# Patient Record
Sex: FEMALE | Race: WHITE | NOT HISPANIC OR LATINO | Employment: OTHER | URBAN - METROPOLITAN AREA
[De-identification: names, ages, dates, MRNs, and addresses within clinical notes are randomized per-mention and may not be internally consistent; named-entity substitution may affect disease eponyms.]

---

## 2017-01-03 DIAGNOSIS — M41.56 OTHER SECONDARY SCOLIOSIS, LUMBAR REGION: Primary | ICD-10-CM

## 2017-01-06 DIAGNOSIS — M48.061 LUMBAR STENOSIS: Primary | ICD-10-CM

## 2017-04-25 RX ORDER — LISINOPRIL AND HYDROCHLOROTHIAZIDE 12.5; 1 MG/1; MG/1
1 TABLET ORAL DAILY
COMMUNITY
End: 2017-05-01

## 2017-05-01 ENCOUNTER — APPOINTMENT (OUTPATIENT)
Dept: PREADMISSION TESTING | Facility: HOSPITAL | Age: 62
End: 2017-05-01

## 2017-05-01 VITALS
TEMPERATURE: 98.8 F | OXYGEN SATURATION: 100 % | HEART RATE: 68 BPM | HEIGHT: 63 IN | DIASTOLIC BLOOD PRESSURE: 67 MMHG | BODY MASS INDEX: 28.24 KG/M2 | RESPIRATION RATE: 16 BRPM | SYSTOLIC BLOOD PRESSURE: 103 MMHG | WEIGHT: 159.38 LBS

## 2017-05-01 LAB
ANION GAP SERPL CALCULATED.3IONS-SCNC: 13.2 MMOL/L
BUN BLD-MCNC: 20 MG/DL (ref 8–23)
BUN/CREAT SERPL: 20.6 (ref 7–25)
CALCIUM SPEC-SCNC: 9.4 MG/DL (ref 8.6–10.5)
CHLORIDE SERPL-SCNC: 95 MMOL/L (ref 98–107)
CO2 SERPL-SCNC: 26.8 MMOL/L (ref 22–29)
CREAT BLD-MCNC: 0.97 MG/DL (ref 0.57–1)
DEPRECATED RDW RBC AUTO: 44.8 FL (ref 37–54)
ERYTHROCYTE [DISTWIDTH] IN BLOOD BY AUTOMATED COUNT: 13.1 % (ref 11.7–13)
GFR SERPL CREATININE-BSD FRML MDRD: 58 ML/MIN/1.73
GLUCOSE BLD-MCNC: 176 MG/DL (ref 65–99)
HCT VFR BLD AUTO: 39.1 % (ref 35.6–45.5)
HGB BLD-MCNC: 12.9 G/DL (ref 11.9–15.5)
MCH RBC QN AUTO: 31.4 PG (ref 26.9–32)
MCHC RBC AUTO-ENTMCNC: 33 G/DL (ref 32.4–36.3)
MCV RBC AUTO: 95.1 FL (ref 80.5–98.2)
PLATELET # BLD AUTO: 224 10*3/MM3 (ref 140–500)
PMV BLD AUTO: 11.6 FL (ref 6–12)
POTASSIUM BLD-SCNC: 4 MMOL/L (ref 3.5–5.2)
RBC # BLD AUTO: 4.11 10*6/MM3 (ref 3.9–5.2)
SODIUM BLD-SCNC: 135 MMOL/L (ref 136–145)
WBC NRBC COR # BLD: 5.96 10*3/MM3 (ref 4.5–10.7)

## 2017-05-01 PROCEDURE — 93005 ELECTROCARDIOGRAM TRACING: CPT

## 2017-05-01 PROCEDURE — 85027 COMPLETE CBC AUTOMATED: CPT | Performed by: ORTHOPAEDIC SURGERY

## 2017-05-01 PROCEDURE — 93010 ELECTROCARDIOGRAM REPORT: CPT | Performed by: INTERNAL MEDICINE

## 2017-05-01 PROCEDURE — 80048 BASIC METABOLIC PNL TOTAL CA: CPT | Performed by: ORTHOPAEDIC SURGERY

## 2017-05-01 PROCEDURE — 36415 COLL VENOUS BLD VENIPUNCTURE: CPT

## 2017-05-01 RX ORDER — HYDROCHLOROTHIAZIDE 12.5 MG/1
12.5 CAPSULE, GELATIN COATED ORAL EVERY MORNING
COMMUNITY
End: 2019-11-01 | Stop reason: SDUPTHER

## 2017-05-01 RX ORDER — LISINOPRIL 5 MG/1
5 TABLET ORAL EVERY MORNING
COMMUNITY
End: 2020-02-07 | Stop reason: SDUPTHER

## 2017-05-09 ENCOUNTER — OFFICE VISIT (OUTPATIENT)
Dept: NEUROSURGERY | Facility: CLINIC | Age: 62
End: 2017-05-09

## 2017-05-09 ENCOUNTER — TELEPHONE (OUTPATIENT)
Dept: ORTHOPEDIC SURGERY | Facility: CLINIC | Age: 62
End: 2017-05-09

## 2017-05-09 VITALS
DIASTOLIC BLOOD PRESSURE: 70 MMHG | SYSTOLIC BLOOD PRESSURE: 113 MMHG | WEIGHT: 158 LBS | HEART RATE: 83 BPM | HEIGHT: 63 IN | BODY MASS INDEX: 28 KG/M2

## 2017-05-09 DIAGNOSIS — M54.16 LUMBAR RADICULOPATHY: ICD-10-CM

## 2017-05-09 DIAGNOSIS — M48.061 LUMBAR CANAL STENOSIS: Primary | ICD-10-CM

## 2017-05-09 PROCEDURE — 99244 OFF/OP CNSLTJ NEW/EST MOD 40: CPT | Performed by: NEUROLOGICAL SURGERY

## 2018-03-23 ENCOUNTER — APPOINTMENT (OUTPATIENT)
Dept: WOMENS IMAGING | Facility: HOSPITAL | Age: 63
End: 2018-03-23

## 2018-03-23 PROCEDURE — 77063 BREAST TOMOSYNTHESIS BI: CPT | Performed by: RADIOLOGY

## 2018-03-23 PROCEDURE — MDREVSPNEW: Performed by: RADIOLOGY

## 2018-03-23 PROCEDURE — 77067 SCR MAMMO BI INCL CAD: CPT | Performed by: RADIOLOGY

## 2018-06-15 NOTE — PROGRESS NOTES
Subjective   Patient ID: Kathy Ramirez is a 63 y.o. female is here today for follow-up on back pain that radiates into her right leg with numbness and tingling. She also has had some neck pain for approximately 1 month with radiation into bilateral upper extremity's    History of Present Illness    This patient continues with pain in her back with radiation primarily into her right leg with some numbness and tingling.  This is pretty much unchanged from when I saw her a little over a year ago.  She has been dealing with the symptoms and just tolerating them.  Generally she can do most activities that she wants to do.  Over the past several months she has developed some pain in her neck with some numbness and tingling radiating into her arms area most of the symptoms are in the right arm but she has some temp symptoms on the left as well.  The symptoms on the left has started only recently.  She has been treated with chiropractic but had no other treatment so far.  The pain that she has is dull and aching.  The pain is worse with activity and a little bit better with rest.  She has no difficulty with bowel or bladder control or other associated symptoms.    The following portions of the patient's history were reviewed and updated as appropriate: allergies, current medications, past family history, past medical history, past social history, past surgical history and problem list.    Review of Systems   Respiratory: Negative for chest tightness and shortness of breath.    Cardiovascular: Negative for chest pain.   Musculoskeletal: Positive for back pain and neck pain.        Right leg pain   Neurological: Positive for numbness.        Tingling   All other systems reviewed and are negative.      Objective   Physical Exam   Constitutional: She is oriented to person, place, and time. She appears well-developed and well-nourished.   Eyes: EOM are normal. Pupils are equal, round, and reactive to light.   Neurological: She is  oriented to person, place, and time. She has a normal Finger-Nose-Finger Test and a normal Heel to Shin Test. Gait normal.   Reflex Scores:       Tricep reflexes are 2+ on the right side and 2+ on the left side.       Bicep reflexes are 2+ on the right side and 2+ on the left side.       Brachioradialis reflexes are 2+ on the right side and 2+ on the left side.       Patellar reflexes are 2+ on the right side and 2+ on the left side.       Achilles reflexes are 2+ on the right side and 2+ on the left side.  Psychiatric: Her speech is normal.     Neurologic Exam     Mental Status   Oriented to person, place, and time.   Registration of memory: Good recent and remote memory.   Attention: normal. Concentration: normal.   Speech: speech is normal   Level of consciousness: alert  Knowledge: consistent with education.     Cranial Nerves     CN II   Visual fields full to confrontation.   Visual acuity: normal    CN III, IV, VI   Pupils are equal, round, and reactive to light.  Extraocular motions are normal.     CN V   Facial sensation intact.   Right corneal reflex: normal  Left corneal reflex: normal    CN VII   Facial expression full, symmetric.   Right facial weakness: none  Left facial weakness: none    CN VIII   Hearing: intact    CN IX, X   Palate: symmetric    CN XI   Right sternocleidomastoid strength: normal  Left sternocleidomastoid strength: normal    CN XII   Tongue: not atrophic  Tongue deviation: none    Motor Exam   Muscle bulk: normal  Right arm tone: normal  Left arm tone: normal  Right leg tone: normal  Left leg tone: normal    Strength   Strength 5/5 except as noted.     Sensory Exam   Light touch normal.     Gait, Coordination, and Reflexes     Gait  Gait: normal    Coordination   Finger to nose coordination: normal  Heel to shin coordination: normal    Reflexes   Right brachioradialis: 2+  Left brachioradialis: 2+  Right biceps: 2+  Left biceps: 2+  Right triceps: 2+  Left triceps: 2+  Right patellar:  2+  Left patellar: 2+  Right achilles: 2+  Left achilles: 2+  Right : 2+  Left : 2+      Assessment/Plan   Independent Review of Radiographic Studies:      I reviewed an MRI of her cervical spine done on April 26 myself.  This shows a widely patent canal and neural foramina at C2 3 and C3 4.  C4 5 is also fairly open but C5 6 shows some midline disc bulging and some stenosis causing distortion of the cord.  C6 7 is fairly open as is C7-T1.    I also reviewed an MRI of the lumbar spine done on the same day.  This shows a vestigial disc at S1 and S2.  There is some disc bulging to the right side at L1 2 that maybe narrowing the foramen a little bit.  At L2-3 there is also a little disc bulging but no significant stenosis.  There is a large synovial cyst to the left side at L3 4 is similar to the previous CAT scan and myelogram.  L4 5 shows some bilateral lateral recess stenosis and L5-S1 mostly looks okay.    I also reviewed her myelogram from December 2016 which shows a large defect on the left side at L3 4 and some defect on the left side at L4 5 as well.  The narrowing is not nearly as bad at L4 5 as it is at L3 4.  The other levels mostly look okay.  On the post myelographic CT scan the lower thoracic spine down to L2 3 looks okay.  L3 4 shows a synovial cyst on the left side similar to the current MRI.  L4 5 shows a little lateral recess stenosis again similar to the current MRI.  L5-S1 is mostly open.    Medical Decision Making:      I told the patient about the imaging.  I told her that nothing is really changed in her lumbar spine and since she has mostly been living with this and tolerating it I really wouldn't suggest doing a lot else with her right now in the lower back.  The neck is a little bit different story in that the symptoms have developed more recently and are more neurologic symptoms and pain.  Plus she has a fair amount of stenosis at C5 6 area there is some spinal cord pressure as well.   I told the patient about the surgery which is called an anterior cervical discectomy.  I explained that there is an 80% chance of getting rid of the arm pain and any other arm symptoms.  I also explained that the patient would still have neck pain.  Initially this will be quite severe however it will improve with healing from the surgery.  There is also a risk of infection, bleeding, paralysis, and anesthetic risk.  There is a risk of damage to the soft tissues of the neck such as the esophagus, carotids, and trachea.  All of these risks are about 2 or 3%.  There is about a 5% chance of nonunion or failure of the instrumentation.  There is also a about a 5% chance of hoarseness and trouble swallowing do to damage to the recurrent laryngeal nerve.  We discussed the postoperative hospital and home course as well.  The patient does ask to proceed.      She will need to be scheduled for a: C5 6 anterior cervical discectomy, fusion and instrumentation    Kathy was seen today for back pain and neck pain.    Diagnoses and all orders for this visit:    Cervical spinal stenosis  -     Case Request; Standing  -     ceFAZolin (ANCEF) 2 g in sodium chloride 0.9 % 100 mL IVPB; Infuse 2 g into a venous catheter 1 (One) Time.  -     Case Request    Spinal stenosis of lumbar region without neurogenic claudication    Other orders  -     Follow anesthesia standing orders.  -     Obtain informed consent  -     MAYELA hose- To be placed on patient in pre-op; Standing  -     SCD (sequential compression device)- to be placed on patient in Pre-op; Standing      Return for 2-3 week post op.

## 2018-06-21 ENCOUNTER — OFFICE VISIT (OUTPATIENT)
Dept: NEUROSURGERY | Facility: CLINIC | Age: 63
End: 2018-06-21

## 2018-06-21 VITALS — HEART RATE: 67 BPM | SYSTOLIC BLOOD PRESSURE: 149 MMHG | DIASTOLIC BLOOD PRESSURE: 80 MMHG

## 2018-06-21 DIAGNOSIS — M48.061 SPINAL STENOSIS OF LUMBAR REGION WITHOUT NEUROGENIC CLAUDICATION: ICD-10-CM

## 2018-06-21 DIAGNOSIS — M48.02 CERVICAL SPINAL STENOSIS: Primary | ICD-10-CM

## 2018-06-21 PROCEDURE — 99214 OFFICE O/P EST MOD 30 MIN: CPT | Performed by: NEUROLOGICAL SURGERY

## 2018-06-21 RX ORDER — CEFAZOLIN SODIUM 2 G/100ML
2 INJECTION, SOLUTION INTRAVENOUS ONCE
Status: CANCELLED | OUTPATIENT
Start: 2018-07-02 | End: 2018-07-02

## 2018-06-22 ENCOUNTER — APPOINTMENT (OUTPATIENT)
Dept: PREADMISSION TESTING | Facility: HOSPITAL | Age: 63
End: 2018-06-22

## 2018-06-22 LAB
ANION GAP SERPL CALCULATED.3IONS-SCNC: 14.4 MMOL/L
BUN BLD-MCNC: 12 MG/DL (ref 8–23)
BUN/CREAT SERPL: 11.5 (ref 7–25)
CALCIUM SPEC-SCNC: 9.3 MG/DL (ref 8.6–10.5)
CHLORIDE SERPL-SCNC: 96 MMOL/L (ref 98–107)
CO2 SERPL-SCNC: 26.6 MMOL/L (ref 22–29)
CREAT BLD-MCNC: 1.04 MG/DL (ref 0.57–1)
DEPRECATED RDW RBC AUTO: 43.9 FL (ref 37–54)
ERYTHROCYTE [DISTWIDTH] IN BLOOD BY AUTOMATED COUNT: 13 % (ref 11.7–13)
GFR SERPL CREATININE-BSD FRML MDRD: 53 ML/MIN/1.73
GLUCOSE BLD-MCNC: 256 MG/DL (ref 65–99)
HCT VFR BLD AUTO: 39.3 % (ref 35.6–45.5)
HGB BLD-MCNC: 13.4 G/DL (ref 11.9–15.5)
MCH RBC QN AUTO: 31.7 PG (ref 26.9–32)
MCHC RBC AUTO-ENTMCNC: 34.1 G/DL (ref 32.4–36.3)
MCV RBC AUTO: 92.9 FL (ref 80.5–98.2)
PLATELET # BLD AUTO: 226 10*3/MM3 (ref 140–500)
PMV BLD AUTO: 11.7 FL (ref 6–12)
POTASSIUM BLD-SCNC: 3.4 MMOL/L (ref 3.5–5.2)
RBC # BLD AUTO: 4.23 10*6/MM3 (ref 3.9–5.2)
SODIUM BLD-SCNC: 137 MMOL/L (ref 136–145)
WBC NRBC COR # BLD: 6.85 10*3/MM3 (ref 4.5–10.7)

## 2018-06-22 PROCEDURE — 85027 COMPLETE CBC AUTOMATED: CPT | Performed by: NEUROLOGICAL SURGERY

## 2018-06-22 PROCEDURE — 80048 BASIC METABOLIC PNL TOTAL CA: CPT | Performed by: NEUROLOGICAL SURGERY

## 2018-06-22 PROCEDURE — 36415 COLL VENOUS BLD VENIPUNCTURE: CPT

## 2018-06-22 PROCEDURE — 93005 ELECTROCARDIOGRAM TRACING: CPT

## 2018-06-22 PROCEDURE — 93010 ELECTROCARDIOGRAM REPORT: CPT | Performed by: INTERNAL MEDICINE

## 2018-06-22 RX ORDER — GLIMEPIRIDE 2 MG/1
1 TABLET ORAL
COMMUNITY

## 2018-06-22 NOTE — DISCHARGE INSTRUCTIONS
Take the following medications the morning of surgery with a small sip of water:    NONE    ARRIVE AT 10:00    General Instructions:  • Do not eat solid food after midnight the night before surgery.  • You may drink clear liquids day of surgery but must stop at least one hour before your hospital arrival time.  • It is beneficial for you to have a clear drink that contains carbohydrates the day of surgery.  We suggest a 12 to 20 ounce bottle of Gatorade or Powerade for non-diabetic patients or a 12 to 20 ounce bottle of G2 or Powerade Zero for diabetic patients. (Pediatric patients, are not advised to drink a 12 to 20 ounce carbohydrate drink)    Clear liquids are liquids you can see through.  Nothing red in color.     Plain water                               Sports drinks  Sodas                                   Gelatin (Jell-O)  Fruit juices without pulp such as white grape juice and apple juice  Popsicles that contain no fruit or yogurt  Tea or coffee (no cream or milk added)  Gatorade / Powerade  G2 / Powerade Zero    • Infants may have breast milk up to four hours before surgery.  • Infants drinking formula may drink formula up to six hours before surgery.   • Patients who avoid smoking, chewing tobacco and alcohol for 4 weeks prior to surgery have a reduced risk of post-operative complications.  Quit smoking as many days before surgery as you can.  • Do not smoke, use chewing tobacco or drink alcohol the day of surgery.   • If applicable bring your C-PAP/ BI-PAP machine.  • Bring any papers given to you in the doctor’s office.  • Wear clean comfortable clothes and socks.  • Do not wear contact lenses or make-up.  Bring a case for your glasses.   • Bring crutches or walker if applicable.  • Remove all piercings.  Leave jewelry and any other valuables at home.  • Hair extensions with metal clips must be removed prior to surgery.  • The Pre-Admission Testing nurse will instruct you to bring medications if unable  to obtain an accurate list in Pre-Admission Testing.        If you were given a blood bank ID arm band remember to bring it with you the day of surgery.    Preventing a Surgical Site Infection:  • For 2 to 3 days before surgery, avoid shaving with a razor because the razor can irritate skin and make it easier to develop an infection.    • Any areas of open skin can increase the risk of a post-operative wound infection by allowing bacteria to enter and travel throughout the body.  Notify your surgeon if you have any skin wounds / rashes even if it is not near the expected surgical site.  The area will need assessed to determine if surgery should be delayed until it is healed.  • The night prior to surgery sleep in a clean bed with clean clothing.  Do not allow pets to sleep with you.  • Shower on the morning of surgery using a fresh bar of anti-bacterial soap (such as Dial) and clean washcloth.  Dry with a clean towel and dress in clean clothing.  • Ask your surgeon if you will be receiving antibiotics prior to surgery.  • Make sure you, your family, and all healthcare providers clean their hands with soap and water or an alcohol based hand  before caring for you or your wound.    Day of surgery:  Upon arrival, a Pre-op nurse and Anesthesiologist will review your health history, obtain vital signs, and answer questions you may have.  The only belongings needed at this time will be your home medications and if applicable your C-PAP/BI-PAP machine.  If you are staying overnight your family can leave the rest of your belongings in the car and bring them to your room later.  A Pre-op nurse will start an IV and you may receive medication in preparation for surgery, including something to help you relax.  Your family will be able to see you in the Pre-op area.  While you are in surgery your family should notify the waiting room  if they leave the waiting room area and provide a contact phone  number.    Please be aware that surgery does come with discomfort.  We want to make every effort to control your discomfort so please discuss any uncontrolled symptoms with your nurse.   Your doctor will most likely have prescribed pain medications.      If you are going home after surgery you will receive individualized written care instructions before being discharged.  A responsible adult must drive you to and from the hospital on the day of your surgery and stay with you for 24 hours.    If you are staying overnight following surgery, you will be transported to your hospital room following the recovery period.  [facility] has all private rooms.    If you have any questions please call Pre-Admission Testing at [phone].  Deductibles and co-payments are collected on the day of service. Please be prepared to pay the required co-pay, deductible or deposit on the day of service as defined by your plan.

## 2018-07-02 ENCOUNTER — ANESTHESIA EVENT (OUTPATIENT)
Dept: PERIOP | Facility: HOSPITAL | Age: 63
End: 2018-07-02

## 2018-07-02 ENCOUNTER — ANESTHESIA (OUTPATIENT)
Dept: PERIOP | Facility: HOSPITAL | Age: 63
End: 2018-07-02

## 2018-07-02 ENCOUNTER — HOSPITAL ENCOUNTER (OUTPATIENT)
Facility: HOSPITAL | Age: 63
Discharge: HOME | End: 2018-07-04
Attending: NEUROLOGICAL SURGERY | Admitting: NEUROLOGICAL SURGERY

## 2018-07-02 ENCOUNTER — APPOINTMENT (OUTPATIENT)
Dept: GENERAL RADIOLOGY | Facility: HOSPITAL | Age: 63
End: 2018-07-02

## 2018-07-02 DIAGNOSIS — M48.02 CERVICAL SPINAL STENOSIS: ICD-10-CM

## 2018-07-02 PROBLEM — I10 HYPERTENSION: Status: ACTIVE | Noted: 2018-07-02

## 2018-07-02 PROBLEM — E11.9 DIABETES MELLITUS (HCC): Status: ACTIVE | Noted: 2018-07-02

## 2018-07-02 PROBLEM — M06.9 RHEUMATOID ARTHRITIS (HCC): Status: ACTIVE | Noted: 2018-07-02

## 2018-07-02 LAB
ANION GAP SERPL CALCULATED.3IONS-SCNC: 12.9 MMOL/L
BUN BLD-MCNC: 16 MG/DL (ref 8–23)
BUN/CREAT SERPL: 16.8 (ref 7–25)
CALCIUM SPEC-SCNC: 9 MG/DL (ref 8.6–10.5)
CHLORIDE SERPL-SCNC: 103 MMOL/L (ref 98–107)
CO2 SERPL-SCNC: 24.1 MMOL/L (ref 22–29)
CREAT BLD-MCNC: 0.95 MG/DL (ref 0.57–1)
GFR SERPL CREATININE-BSD FRML MDRD: 59 ML/MIN/1.73
GLUCOSE BLD-MCNC: 165 MG/DL (ref 65–99)
GLUCOSE BLDC GLUCOMTR-MCNC: 158 MG/DL (ref 70–130)
GLUCOSE BLDC GLUCOMTR-MCNC: 167 MG/DL (ref 70–130)
GLUCOSE BLDC GLUCOMTR-MCNC: 171 MG/DL (ref 70–130)
GLUCOSE BLDC GLUCOMTR-MCNC: 174 MG/DL (ref 70–130)
POTASSIUM BLD-SCNC: 3.7 MMOL/L (ref 3.5–5.2)
SODIUM BLD-SCNC: 140 MMOL/L (ref 136–145)

## 2018-07-02 PROCEDURE — 25010000002 HYDROMORPHONE PER 4 MG: Performed by: NURSE ANESTHETIST, CERTIFIED REGISTERED

## 2018-07-02 PROCEDURE — 25010000002 MORPHINE PER 10 MG: Performed by: NEUROLOGICAL SURGERY

## 2018-07-02 PROCEDURE — 25810000003 SODIUM CHLORIDE 0.9 % WITH KCL 20 MEQ 20-0.9 MEQ/L-% SOLUTION: Performed by: NEUROLOGICAL SURGERY

## 2018-07-02 PROCEDURE — 63710000001 INSULIN ASPART PER 5 UNITS: Performed by: INTERNAL MEDICINE

## 2018-07-02 PROCEDURE — 25010000002 FENTANYL CITRATE (PF) 100 MCG/2ML SOLUTION: Performed by: ANESTHESIOLOGY

## 2018-07-02 PROCEDURE — 25010000002 PROPOFOL 10 MG/ML EMULSION: Performed by: NURSE ANESTHETIST, CERTIFIED REGISTERED

## 2018-07-02 PROCEDURE — 25010000002 FENTANYL CITRATE (PF) 100 MCG/2ML SOLUTION: Performed by: NURSE ANESTHETIST, CERTIFIED REGISTERED

## 2018-07-02 PROCEDURE — 22853 INSJ BIOMECHANICAL DEVICE: CPT | Performed by: NEUROLOGICAL SURGERY

## 2018-07-02 PROCEDURE — 25010000002 ONDANSETRON PER 1 MG: Performed by: NURSE ANESTHETIST, CERTIFIED REGISTERED

## 2018-07-02 PROCEDURE — G0378 HOSPITAL OBSERVATION PER HR: HCPCS

## 2018-07-02 PROCEDURE — 80048 BASIC METABOLIC PNL TOTAL CA: CPT | Performed by: ANESTHESIOLOGY

## 2018-07-02 PROCEDURE — 25010000003 CEFAZOLIN IN DEXTROSE 2-4 GM/100ML-% SOLUTION: Performed by: NEUROLOGICAL SURGERY

## 2018-07-02 PROCEDURE — 76000 FLUOROSCOPY <1 HR PHYS/QHP: CPT

## 2018-07-02 PROCEDURE — C1713 ANCHOR/SCREW BN/BN,TIS/BN: HCPCS | Performed by: NEUROLOGICAL SURGERY

## 2018-07-02 PROCEDURE — 72040 X-RAY EXAM NECK SPINE 2-3 VW: CPT

## 2018-07-02 PROCEDURE — 25010000002 MIDAZOLAM PER 1 MG: Performed by: ANESTHESIOLOGY

## 2018-07-02 PROCEDURE — 22551 ARTHRD ANT NTRBDY CERVICAL: CPT | Performed by: NEUROLOGICAL SURGERY

## 2018-07-02 PROCEDURE — 22845 INSERT SPINE FIXATION DEVICE: CPT | Performed by: NEUROLOGICAL SURGERY

## 2018-07-02 PROCEDURE — 25010000002 PHENYLEPHRINE PER 1 ML: Performed by: NURSE ANESTHETIST, CERTIFIED REGISTERED

## 2018-07-02 PROCEDURE — 82962 GLUCOSE BLOOD TEST: CPT

## 2018-07-02 DEVICE — WAX BONE HEMO NAT 2.5G: Type: IMPLANTABLE DEVICE | Status: FUNCTIONAL

## 2018-07-02 DEVICE — PUTTY DBF GRAFTON 3CC: Type: IMPLANTABLE DEVICE | Site: SPINE CERVICAL | Status: FUNCTIONAL

## 2018-07-02 DEVICE — PLATE 7200025 ATL VISION ELITE 25MM
Type: IMPLANTABLE DEVICE | Site: SPINE CERVICAL | Status: FUNCTIONAL
Brand: ATLANTIS® ANTERIOR CERVICAL PLATE SYSTEM

## 2018-07-02 DEVICE — IMPLANT 6240764 ANATOMIC 16X14X7MM
Type: IMPLANTABLE DEVICE | Site: SPINE CERVICAL | Status: FUNCTIONAL
Brand: VERTE-STACK® SPINAL SYSTEM

## 2018-07-02 RX ORDER — SODIUM CHLORIDE 0.9 % (FLUSH) 0.9 %
1-10 SYRINGE (ML) INJECTION AS NEEDED
Status: DISCONTINUED | OUTPATIENT
Start: 2018-07-02 | End: 2018-07-02 | Stop reason: HOSPADM

## 2018-07-02 RX ORDER — PROMETHAZINE HYDROCHLORIDE 25 MG/1
12.5 TABLET ORAL EVERY 6 HOURS PRN
Status: DISCONTINUED | OUTPATIENT
Start: 2018-07-02 | End: 2018-07-04 | Stop reason: HOSPADM

## 2018-07-02 RX ORDER — PROMETHAZINE HYDROCHLORIDE 25 MG/ML
12.5 INJECTION, SOLUTION INTRAMUSCULAR; INTRAVENOUS ONCE AS NEEDED
Status: DISCONTINUED | OUTPATIENT
Start: 2018-07-02 | End: 2018-07-02

## 2018-07-02 RX ORDER — MIDAZOLAM HYDROCHLORIDE 1 MG/ML
2 INJECTION INTRAMUSCULAR; INTRAVENOUS
Status: DISCONTINUED | OUTPATIENT
Start: 2018-07-02 | End: 2018-07-02 | Stop reason: HOSPADM

## 2018-07-02 RX ORDER — DEXTROSE MONOHYDRATE 25 G/50ML
25 INJECTION, SOLUTION INTRAVENOUS
Status: DISCONTINUED | OUTPATIENT
Start: 2018-07-02 | End: 2018-07-04 | Stop reason: HOSPADM

## 2018-07-02 RX ORDER — MORPHINE SULFATE 2 MG/ML
2 INJECTION, SOLUTION INTRAMUSCULAR; INTRAVENOUS EVERY 4 HOURS PRN
Status: DISCONTINUED | OUTPATIENT
Start: 2018-07-02 | End: 2018-07-04 | Stop reason: HOSPADM

## 2018-07-02 RX ORDER — ONDANSETRON 2 MG/ML
4 INJECTION INTRAMUSCULAR; INTRAVENOUS ONCE AS NEEDED
Status: DISCONTINUED | OUTPATIENT
Start: 2018-07-02 | End: 2018-07-02

## 2018-07-02 RX ORDER — ATORVASTATIN CALCIUM 10 MG/1
10 TABLET, FILM COATED ORAL EVERY EVENING
Status: DISCONTINUED | OUTPATIENT
Start: 2018-07-02 | End: 2018-07-04 | Stop reason: HOSPADM

## 2018-07-02 RX ORDER — SODIUM CHLORIDE AND POTASSIUM CHLORIDE 150; 900 MG/100ML; MG/100ML
100 INJECTION, SOLUTION INTRAVENOUS CONTINUOUS
Status: DISCONTINUED | OUTPATIENT
Start: 2018-07-02 | End: 2018-07-04 | Stop reason: HOSPADM

## 2018-07-02 RX ORDER — SODIUM CHLORIDE 0.9 % (FLUSH) 0.9 %
1-10 SYRINGE (ML) INJECTION AS NEEDED
Status: DISCONTINUED | OUTPATIENT
Start: 2018-07-02 | End: 2018-07-04 | Stop reason: HOSPADM

## 2018-07-02 RX ORDER — FAMOTIDINE 10 MG/ML
20 INJECTION, SOLUTION INTRAVENOUS ONCE
Status: COMPLETED | OUTPATIENT
Start: 2018-07-02 | End: 2018-07-02

## 2018-07-02 RX ORDER — PROMETHAZINE HYDROCHLORIDE 25 MG/1
25 SUPPOSITORY RECTAL ONCE AS NEEDED
Status: DISCONTINUED | OUTPATIENT
Start: 2018-07-02 | End: 2018-07-02

## 2018-07-02 RX ORDER — LIDOCAINE HYDROCHLORIDE 10 MG/ML
0.5 INJECTION, SOLUTION EPIDURAL; INFILTRATION; INTRACAUDAL; PERINEURAL ONCE AS NEEDED
Status: DISCONTINUED | OUTPATIENT
Start: 2018-07-02 | End: 2018-07-02 | Stop reason: HOSPADM

## 2018-07-02 RX ORDER — CYCLOBENZAPRINE HCL 10 MG
10 TABLET ORAL 3 TIMES DAILY PRN
Status: DISCONTINUED | OUTPATIENT
Start: 2018-07-02 | End: 2018-07-04 | Stop reason: HOSPADM

## 2018-07-02 RX ORDER — EPHEDRINE SULFATE 50 MG/ML
5 INJECTION, SOLUTION INTRAVENOUS ONCE AS NEEDED
Status: DISCONTINUED | OUTPATIENT
Start: 2018-07-02 | End: 2018-07-02

## 2018-07-02 RX ORDER — HYDROCODONE BITARTRATE AND ACETAMINOPHEN 5; 325 MG/1; MG/1
1 TABLET ORAL ONCE AS NEEDED
Status: DISCONTINUED | OUTPATIENT
Start: 2018-07-02 | End: 2018-07-02

## 2018-07-02 RX ORDER — NICOTINE POLACRILEX 4 MG
15 LOZENGE BUCCAL
Status: DISCONTINUED | OUTPATIENT
Start: 2018-07-02 | End: 2018-07-04 | Stop reason: HOSPADM

## 2018-07-02 RX ORDER — HYDROCHLOROTHIAZIDE 12.5 MG/1
12.5 CAPSULE, GELATIN COATED ORAL EVERY MORNING
Status: DISCONTINUED | OUTPATIENT
Start: 2018-07-03 | End: 2018-07-04 | Stop reason: HOSPADM

## 2018-07-02 RX ORDER — SODIUM CHLORIDE, SODIUM LACTATE, POTASSIUM CHLORIDE, CALCIUM CHLORIDE 600; 310; 30; 20 MG/100ML; MG/100ML; MG/100ML; MG/100ML
100 INJECTION, SOLUTION INTRAVENOUS CONTINUOUS
Status: DISCONTINUED | OUTPATIENT
Start: 2018-07-02 | End: 2018-07-03

## 2018-07-02 RX ORDER — LISINOPRIL 5 MG/1
5 TABLET ORAL EVERY MORNING
Status: DISCONTINUED | OUTPATIENT
Start: 2018-07-03 | End: 2018-07-04 | Stop reason: HOSPADM

## 2018-07-02 RX ORDER — LIDOCAINE HYDROCHLORIDE 20 MG/ML
INJECTION, SOLUTION INFILTRATION; PERINEURAL AS NEEDED
Status: DISCONTINUED | OUTPATIENT
Start: 2018-07-02 | End: 2018-07-02 | Stop reason: SURG

## 2018-07-02 RX ORDER — CYCLOBENZAPRINE HCL 10 MG
10 TABLET ORAL AS NEEDED
Status: DISCONTINUED | OUTPATIENT
Start: 2018-07-02 | End: 2018-07-02

## 2018-07-02 RX ORDER — ROPINIROLE 1 MG/1
1 TABLET, FILM COATED ORAL NIGHTLY
Status: DISCONTINUED | OUTPATIENT
Start: 2018-07-02 | End: 2018-07-04 | Stop reason: HOSPADM

## 2018-07-02 RX ORDER — NALOXONE HCL 0.4 MG/ML
0.2 VIAL (ML) INJECTION AS NEEDED
Status: DISCONTINUED | OUTPATIENT
Start: 2018-07-02 | End: 2018-07-02

## 2018-07-02 RX ORDER — ONDANSETRON 2 MG/ML
INJECTION INTRAMUSCULAR; INTRAVENOUS AS NEEDED
Status: DISCONTINUED | OUTPATIENT
Start: 2018-07-02 | End: 2018-07-02 | Stop reason: SURG

## 2018-07-02 RX ORDER — HYDROCODONE BITARTRATE AND ACETAMINOPHEN 5; 325 MG/1; MG/1
1 TABLET ORAL EVERY 4 HOURS PRN
Status: DISCONTINUED | OUTPATIENT
Start: 2018-07-02 | End: 2018-07-04 | Stop reason: HOSPADM

## 2018-07-02 RX ORDER — FENTANYL CITRATE 50 UG/ML
INJECTION, SOLUTION INTRAMUSCULAR; INTRAVENOUS AS NEEDED
Status: DISCONTINUED | OUTPATIENT
Start: 2018-07-02 | End: 2018-07-02 | Stop reason: SURG

## 2018-07-02 RX ORDER — ONDANSETRON 4 MG/1
4 TABLET, ORALLY DISINTEGRATING ORAL EVERY 6 HOURS PRN
Status: DISCONTINUED | OUTPATIENT
Start: 2018-07-02 | End: 2018-07-04 | Stop reason: HOSPADM

## 2018-07-02 RX ORDER — FLUMAZENIL 0.1 MG/ML
0.2 INJECTION INTRAVENOUS AS NEEDED
Status: DISCONTINUED | OUTPATIENT
Start: 2018-07-02 | End: 2018-07-02

## 2018-07-02 RX ORDER — MIDAZOLAM HYDROCHLORIDE 1 MG/ML
1 INJECTION INTRAMUSCULAR; INTRAVENOUS
Status: DISCONTINUED | OUTPATIENT
Start: 2018-07-02 | End: 2018-07-02 | Stop reason: HOSPADM

## 2018-07-02 RX ORDER — SODIUM CHLORIDE 9 MG/ML
INJECTION, SOLUTION INTRAVENOUS AS NEEDED
Status: DISCONTINUED | OUTPATIENT
Start: 2018-07-02 | End: 2018-07-02 | Stop reason: HOSPADM

## 2018-07-02 RX ORDER — ONDANSETRON 4 MG/1
4 TABLET, FILM COATED ORAL EVERY 6 HOURS PRN
Status: DISCONTINUED | OUTPATIENT
Start: 2018-07-02 | End: 2018-07-04 | Stop reason: HOSPADM

## 2018-07-02 RX ORDER — LEFLUNOMIDE 20 MG/1
20 TABLET ORAL NIGHTLY
Status: DISCONTINUED | OUTPATIENT
Start: 2018-07-02 | End: 2018-07-04 | Stop reason: HOSPADM

## 2018-07-02 RX ORDER — PROPOFOL 10 MG/ML
VIAL (ML) INTRAVENOUS AS NEEDED
Status: DISCONTINUED | OUTPATIENT
Start: 2018-07-02 | End: 2018-07-02 | Stop reason: SURG

## 2018-07-02 RX ORDER — LABETALOL HYDROCHLORIDE 5 MG/ML
5 INJECTION, SOLUTION INTRAVENOUS
Status: DISCONTINUED | OUTPATIENT
Start: 2018-07-02 | End: 2018-07-02

## 2018-07-02 RX ORDER — ALBUTEROL SULFATE 2.5 MG/3ML
2.5 SOLUTION RESPIRATORY (INHALATION) ONCE AS NEEDED
Status: DISCONTINUED | OUTPATIENT
Start: 2018-07-02 | End: 2018-07-02

## 2018-07-02 RX ORDER — PROMETHAZINE HYDROCHLORIDE 25 MG/1
25 TABLET ORAL ONCE AS NEEDED
Status: DISCONTINUED | OUTPATIENT
Start: 2018-07-02 | End: 2018-07-02

## 2018-07-02 RX ORDER — FENTANYL CITRATE 50 UG/ML
50 INJECTION, SOLUTION INTRAMUSCULAR; INTRAVENOUS
Status: DISCONTINUED | OUTPATIENT
Start: 2018-07-02 | End: 2018-07-02 | Stop reason: HOSPADM

## 2018-07-02 RX ORDER — CEFAZOLIN SODIUM 2 G/100ML
2 INJECTION, SOLUTION INTRAVENOUS ONCE
Status: COMPLETED | OUTPATIENT
Start: 2018-07-02 | End: 2018-07-02

## 2018-07-02 RX ORDER — ONDANSETRON 2 MG/ML
4 INJECTION INTRAMUSCULAR; INTRAVENOUS EVERY 6 HOURS PRN
Status: DISCONTINUED | OUTPATIENT
Start: 2018-07-02 | End: 2018-07-04 | Stop reason: HOSPADM

## 2018-07-02 RX ORDER — NALOXONE HCL 0.4 MG/ML
0.4 VIAL (ML) INJECTION
Status: DISCONTINUED | OUTPATIENT
Start: 2018-07-02 | End: 2018-07-04 | Stop reason: HOSPADM

## 2018-07-02 RX ORDER — SODIUM CHLORIDE, SODIUM LACTATE, POTASSIUM CHLORIDE, CALCIUM CHLORIDE 600; 310; 30; 20 MG/100ML; MG/100ML; MG/100ML; MG/100ML
9 INJECTION, SOLUTION INTRAVENOUS CONTINUOUS
Status: DISCONTINUED | OUTPATIENT
Start: 2018-07-02 | End: 2018-07-02

## 2018-07-02 RX ORDER — SCOLOPAMINE TRANSDERMAL SYSTEM 1 MG/1
1 PATCH, EXTENDED RELEASE TRANSDERMAL ONCE
Status: DISCONTINUED | OUTPATIENT
Start: 2018-07-02 | End: 2018-07-03

## 2018-07-02 RX ORDER — ROCURONIUM BROMIDE 10 MG/ML
INJECTION, SOLUTION INTRAVENOUS AS NEEDED
Status: DISCONTINUED | OUTPATIENT
Start: 2018-07-02 | End: 2018-07-02 | Stop reason: SURG

## 2018-07-02 RX ORDER — FENTANYL CITRATE 50 UG/ML
50 INJECTION, SOLUTION INTRAMUSCULAR; INTRAVENOUS
Status: DISCONTINUED | OUTPATIENT
Start: 2018-07-02 | End: 2018-07-02

## 2018-07-02 RX ORDER — DIPHENHYDRAMINE HYDROCHLORIDE 50 MG/ML
12.5 INJECTION INTRAMUSCULAR; INTRAVENOUS
Status: DISCONTINUED | OUTPATIENT
Start: 2018-07-02 | End: 2018-07-02

## 2018-07-02 RX ADMIN — FENTANYL CITRATE 50 MCG: 50 INJECTION INTRAMUSCULAR; INTRAVENOUS at 14:02

## 2018-07-02 RX ADMIN — MIDAZOLAM 1 MG: 1 INJECTION INTRAMUSCULAR; INTRAVENOUS at 11:01

## 2018-07-02 RX ADMIN — HYDROMORPHONE HYDROCHLORIDE 0.5 MG: 1 INJECTION, SOLUTION INTRAMUSCULAR; INTRAVENOUS; SUBCUTANEOUS at 14:48

## 2018-07-02 RX ADMIN — ROCURONIUM BROMIDE 50 MG: 10 INJECTION INTRAVENOUS at 12:55

## 2018-07-02 RX ADMIN — FENTANYL CITRATE 50 MCG: 50 INJECTION, SOLUTION INTRAMUSCULAR; INTRAVENOUS at 11:00

## 2018-07-02 RX ADMIN — FENTANYL CITRATE 50 MCG: 50 INJECTION, SOLUTION INTRAMUSCULAR; INTRAVENOUS at 11:44

## 2018-07-02 RX ADMIN — PHENYLEPHRINE HYDROCHLORIDE 50 MCG: 10 INJECTION INTRAVENOUS at 13:10

## 2018-07-02 RX ADMIN — LEFLUNOMIDE 20 MG: 20 TABLET ORAL at 21:54

## 2018-07-02 RX ADMIN — POTASSIUM CHLORIDE AND SODIUM CHLORIDE 100 ML/HR: 900; 150 INJECTION, SOLUTION INTRAVENOUS at 21:55

## 2018-07-02 RX ADMIN — LABETALOL HYDROCHLORIDE 5 MG: 5 INJECTION, SOLUTION INTRAVENOUS at 15:03

## 2018-07-02 RX ADMIN — FENTANYL CITRATE 50 MCG: 50 INJECTION INTRAMUSCULAR; INTRAVENOUS at 13:25

## 2018-07-02 RX ADMIN — MIDAZOLAM 1 MG: 1 INJECTION INTRAMUSCULAR; INTRAVENOUS at 11:43

## 2018-07-02 RX ADMIN — HYDROMORPHONE HYDROCHLORIDE 0.5 MG: 1 INJECTION, SOLUTION INTRAMUSCULAR; INTRAVENOUS; SUBCUTANEOUS at 15:04

## 2018-07-02 RX ADMIN — FENTANYL CITRATE 50 MCG: 50 INJECTION INTRAMUSCULAR; INTRAVENOUS at 13:17

## 2018-07-02 RX ADMIN — ONDANSETRON 4 MG: 2 INJECTION INTRAMUSCULAR; INTRAVENOUS at 13:55

## 2018-07-02 RX ADMIN — PROPOFOL 150 MG: 10 INJECTION, EMULSION INTRAVENOUS at 12:55

## 2018-07-02 RX ADMIN — CEFAZOLIN SODIUM 1 G: 2 INJECTION, SOLUTION INTRAVENOUS at 13:59

## 2018-07-02 RX ADMIN — PHENYLEPHRINE HYDROCHLORIDE 50 MCG: 10 INJECTION INTRAVENOUS at 13:06

## 2018-07-02 RX ADMIN — LABETALOL HYDROCHLORIDE 5 MG: 5 INJECTION, SOLUTION INTRAVENOUS at 14:46

## 2018-07-02 RX ADMIN — ROPINIROLE 1 MG: 1 TABLET, FILM COATED ORAL at 21:54

## 2018-07-02 RX ADMIN — SUGAMMADEX 200 MG: 100 INJECTION, SOLUTION INTRAVENOUS at 14:18

## 2018-07-02 RX ADMIN — SODIUM CHLORIDE, POTASSIUM CHLORIDE, SODIUM LACTATE AND CALCIUM CHLORIDE: 600; 310; 30; 20 INJECTION, SOLUTION INTRAVENOUS at 14:16

## 2018-07-02 RX ADMIN — MORPHINE SULFATE 2 MG: 2 INJECTION, SOLUTION INTRAMUSCULAR; INTRAVENOUS at 17:11

## 2018-07-02 RX ADMIN — LIDOCAINE HYDROCHLORIDE 60 MG: 20 INJECTION, SOLUTION INFILTRATION; PERINEURAL at 12:55

## 2018-07-02 RX ADMIN — SCOPALAMINE 1 PATCH: 1 PATCH, EXTENDED RELEASE TRANSDERMAL at 10:56

## 2018-07-02 RX ADMIN — FENTANYL CITRATE 100 MCG: 50 INJECTION INTRAMUSCULAR; INTRAVENOUS at 12:55

## 2018-07-02 RX ADMIN — CEFAZOLIN SODIUM 2 G: 2 INJECTION, SOLUTION INTRAVENOUS at 12:47

## 2018-07-02 RX ADMIN — HYDROCODONE BITARTRATE AND ACETAMINOPHEN 1 TABLET: 5; 325 TABLET ORAL at 22:06

## 2018-07-02 RX ADMIN — CYCLOBENZAPRINE 10 MG: 10 TABLET, FILM COATED ORAL at 18:16

## 2018-07-02 RX ADMIN — INSULIN ASPART 2 UNITS: 100 INJECTION, SOLUTION INTRAVENOUS; SUBCUTANEOUS at 22:03

## 2018-07-02 RX ADMIN — FENTANYL CITRATE 50 MCG: 50 INJECTION, SOLUTION INTRAMUSCULAR; INTRAVENOUS at 15:20

## 2018-07-02 RX ADMIN — FAMOTIDINE 20 MG: 10 INJECTION, SOLUTION INTRAVENOUS at 11:00

## 2018-07-02 RX ADMIN — SODIUM CHLORIDE, POTASSIUM CHLORIDE, SODIUM LACTATE AND CALCIUM CHLORIDE: 600; 310; 30; 20 INJECTION, SOLUTION INTRAVENOUS at 10:48

## 2018-07-02 RX ADMIN — METOPROLOL SUCCINATE 75 MG: 50 TABLET, FILM COATED, EXTENDED RELEASE ORAL at 21:54

## 2018-07-02 RX ADMIN — ATORVASTATIN CALCIUM 10 MG: 10 TABLET, FILM COATED ORAL at 21:54

## 2018-07-02 NOTE — BRIEF OP NOTE
CERVICAL DISCECTOMY ANTERIOR FUSION WITH INSTRUMENTATION  Progress Note    Kathy Ramirez  7/2/2018    Pre-op Diagnosis:   Cervical spinal stenosis [M48.02]       Post-Op Diagnosis Codes:     * Cervical spinal stenosis [M48.02]    Procedure/CPT® Codes:      Procedure(s):  C5 6 anterior cervical discectomy, fusion and instrumentation    Surgeon(s):  Robbie Rahman MD    Anesthesia: General    Staff:   Circulator: Radha Montague RN; Jose Bills RN  Radiology Technologist: Kayla Mantilla  Scrub Person: Sophia Mckeon RN; Priya Gaitan  Assistant: Thelma Corona CSA    Estimated Blood Loss: 50 mL    Urine Voided: * No values recorded between 7/2/2018 12:45 PM and 7/2/2018  2:25 PM *    Specimens:                None      Drains:   Closed/Suction Drain 1 Right;Anterior Neck Bulb 10 Fr. (Active)   Site Description Unable to view 7/2/2018  2:29 PM   Dressing Status Clean;Dry;Intact 7/2/2018  2:29 PM   Drainage Appearance Bloody 7/2/2018  2:29 PM   Status To bulb suction 7/2/2018  2:29 PM       Findings: stenosis    Complications: none      Robbie Rahman MD     Date: 7/2/2018  Time: 2:43 PM

## 2018-07-02 NOTE — ANESTHESIA PROCEDURE NOTES
Airway  Urgency: elective    Airway not difficult    General Information and Staff    Patient location during procedure: OR  Anesthesiologist: SHONDA ORTEGA  CRNA: MARLI CRUZ    Indications and Patient Condition  Indications for airway management: airway protection    Preoxygenated: yes  MILS not maintained throughout  Mask difficulty assessment: 1 - vent by mask    Final Airway Details  Final airway type: endotracheal airway      Successful airway: ETT  Cuffed: yes   Successful intubation technique: direct laryngoscopy  Facilitating devices/methods: intubating stylet  Endotracheal tube insertion site: oral  Blade: Claudia  Blade size: #3  ETT size: 8.0 mm  Cormack-Lehane Classification: grade I - full view of glottis  Placement verified by: chest auscultation   Cuff volume (mL): 9  Measured from: lips  ETT to lips (cm): 21  Number of attempts at approach: 1    Additional Comments  PreO2 100% face mask, IV induction, easy mask, DVL x1, cords noted, tube through, cuff up, EBBSH, +etCO2, = chest movement, tube secured in place, atraumatic, teeth and lips intact as preop.

## 2018-07-02 NOTE — ANESTHESIA PREPROCEDURE EVALUATION
Anesthesia Evaluation     Patient summary reviewed and Nursing notes reviewed   history of anesthetic complications: PONV  NPO Solid Status: > 8 hours  NPO Liquid Status: > 4 hours           Airway   Mallampati: III  TM distance: >3 FB  Neck ROM: full  no difficulty expected  Dental - normal exam     Pulmonary - negative pulmonary ROS and normal exam   Cardiovascular - normal exam  Exercise tolerance: good (4-7 METS)    Rhythm: regular  Rate: normal    (+) hypertension, dysrhythmias, hyperlipidemia,       Neuro/Psych  (+) numbness,     GI/Hepatic/Renal/Endo    (+) obesity,  GERD,  diabetes mellitus type 2 well controlled,     Musculoskeletal     Abdominal  - normal exam   Substance History - negative use     OB/GYN          Other   (+) arthritis                     Anesthesia Plan    ASA 3     general     intravenous induction   Anesthetic plan and risks discussed with patient.

## 2018-07-02 NOTE — OP NOTE
Preoperative diagnosis: Cervical stenosis with cervical radiculopathy C5 6    Postoperative diagnosis: Same    Procedure performed: Anterior cervical discectomy C5 6 with placement of a 7 mm cornerstone peek cage and a 25 mm anterior cervical plate using 4 14 mm screws    Surgeon: Robbie Rahman M.D.    Assistant: Thelma Corona CFA who was instrumental in helping with hemostasis, visualization of neural structures, and retraction of neural structures.    Indications for the procedure: This patient was having severe symptoms in the neck and arms.   Imaging showed significant neural compressionin the cervical spine at C5 6.    Operative summary: After induction of general anesthesia with intravenous agents patient was intubated and placed on the operating table in the supine position.  The head was hyperextended on donut roll and a roll of sheets was placed under the shoulders.  The shoulders were taped down being careful not to stretch the brachial plexus too much.  All peripheral points of entrapment and pressure were padded and secured.   The neck was prepped with Chloraprep.    An incision was made in the right lower portion of the neck.  This was carried down to the platysma.  The platysma was opened in the direction of its fibers.  Dissection was carried down through the middle cervical fascia to the anterior aspect of the cervical spine.  A needle was placed in the first available disc space.  This did prove to be C5 6.  Attention was turned to that level.  The longus colli muscles were elevated off both sides of the anterior cervical spine and then the Cloward retractors were locked under the longus colli muscles and used to hold the esophagus, trachea, and recurrent laryngeal bundle medially and the carotid bundle and its contents laterally.  The disc space at the C5 6 level was incised and disc material was removed with the 0 and 3-0 up-biting and straight curettes. The pituitary was also used to remove  disc material.  Following this the posterior lip of the vertebral body was drilled off in combination with the uncovertebral joints on each side.    The posterior longitudinal ligament was then opened and removed from foramen to foramen completely decompressing the spinal cord and the nerve roots.  Once the nerve roots were visualized in each neural foramen and found to be completely decompressed bleeding was controlled with a combination of the bipolar cautery, FloSeal, and thrombin and Gelfoam.  Once the bleeding was stopped the disc space was sized and a 7 mm cornerstone PEEK cage was placed into the disc space.  It was filled first with Viviana putty. The compression at this level was primarily due to stenosis.    A 25 mm Atlantis plate was then attached to the front of the cervical spine using 4 14 mm screws.    The retractors were removed and final x-rays taken. Bleeding was controlled with the bipolar cautery and a drain was placed in the anterior cervical space and tunneled subcutaneously. It was then sewn into place and the incision was closed in layers, dressed and the patient was taken to the recovery room in stable condition.  Sponge instrument and needle counts were correct at the end of the procedure.

## 2018-07-02 NOTE — PLAN OF CARE
Problem: Patient Care Overview  Goal: Plan of Care Review  Outcome: Ongoing (interventions implemented as appropriate)   07/02/18 0689   Coping/Psychosocial   Plan of Care Reviewed With patient;family   Plan of Care Review   Progress improving   OTHER   Outcome Summary Doing well, tolerating a clear liquid diet. Pain is being controlled with ordered medications. Will continue to assess, due to void.       Problem: Fall Risk (Adult)  Goal: Identify Related Risk Factors and Signs and Symptoms  Outcome: Ongoing (interventions implemented as appropriate)    Goal: Absence of Fall  Outcome: Ongoing (interventions implemented as appropriate)      Problem: Pain, Acute (Adult)  Goal: Identify Related Risk Factors and Signs and Symptoms  Outcome: Ongoing (interventions implemented as appropriate)    Goal: Acceptable Pain Control/Comfort Level  Outcome: Ongoing (interventions implemented as appropriate)      Problem: Laminectomy/Foraminotomy/Discectomy (Adult)  Goal: Signs and Symptoms of Listed Potential Problems Will be Absent, Minimized or Managed (Laminectomy/Foraminotomy/Discectomy)  Outcome: Ongoing (interventions implemented as appropriate)

## 2018-07-02 NOTE — ANESTHESIA POSTPROCEDURE EVALUATION
"Patient: Kathy Ramirez    Procedure Summary     Date:  07/02/18 Room / Location:  [facility] OR  / [facility] MAIN OR    Anesthesia Start:  1247 Anesthesia Stop:  1435    Procedure:  C5 6 anterior cervical discectomy, fusion and instrumentation (N/A Spine Cervical) Diagnosis:       Cervical spinal stenosis      (Cervical spinal stenosis [M48.02])    Surgeon:  Robbie Rahman MD Provider:  Lalo Mares MD    Anesthesia Type:  general ASA Status:  3          Anesthesia Type: general  Last vitals  BP   170/99 (07/02/18 1503)   Temp   36.8 °C (98.3 °F) (07/02/18 1429)   Pulse   80 (07/02/18 1503)   Resp   18 (07/02/18 1455)     SpO2   95 % (07/02/18 1455)     Post Anesthesia Care and Evaluation    Patient location during evaluation: bedside  Patient participation: complete - patient participated  Level of consciousness: awake  Pain score: 2  Pain management: adequate  Airway patency: patent  Anesthetic complications: No anesthetic complications    Cardiovascular status: acceptable  Respiratory status: acceptable  Hydration status: acceptable    Comments: /99   Pulse 80   Temp 36.8 °C (98.3 °F) (Oral)   Resp 18   Ht 160 cm (62.99\")   Wt 76.2 kg (167 lb 15.9 oz)   SpO2 95%   BMI 29.77 kg/m²         "

## 2018-07-03 ENCOUNTER — APPOINTMENT (OUTPATIENT)
Dept: GENERAL RADIOLOGY | Facility: HOSPITAL | Age: 63
End: 2018-07-03
Attending: NEUROLOGICAL SURGERY

## 2018-07-03 LAB
ALBUMIN SERPL-MCNC: 3.5 G/DL (ref 3.5–5.2)
ALBUMIN/GLOB SERPL: 1.1 G/DL
ALP SERPL-CCNC: 75 U/L (ref 39–117)
ALT SERPL W P-5'-P-CCNC: 80 U/L (ref 1–33)
ANION GAP SERPL CALCULATED.3IONS-SCNC: 10.2 MMOL/L
AST SERPL-CCNC: 94 U/L (ref 1–32)
BASOPHILS # BLD AUTO: 0.02 10*3/MM3 (ref 0–0.2)
BASOPHILS NFR BLD AUTO: 0.4 % (ref 0–1.5)
BILIRUB SERPL-MCNC: 0.7 MG/DL (ref 0.1–1.2)
BUN BLD-MCNC: 11 MG/DL (ref 8–23)
BUN/CREAT SERPL: 13.1 (ref 7–25)
CALCIUM SPEC-SCNC: 7.5 MG/DL (ref 8.6–10.5)
CHLORIDE SERPL-SCNC: 106 MMOL/L (ref 98–107)
CO2 SERPL-SCNC: 24.8 MMOL/L (ref 22–29)
CREAT BLD-MCNC: 0.84 MG/DL (ref 0.57–1)
DEPRECATED RDW RBC AUTO: 46 FL (ref 37–54)
EOSINOPHIL # BLD AUTO: 0.19 10*3/MM3 (ref 0–0.7)
EOSINOPHIL NFR BLD AUTO: 3.5 % (ref 0.3–6.2)
ERYTHROCYTE [DISTWIDTH] IN BLOOD BY AUTOMATED COUNT: 13.3 % (ref 11.7–13)
GFR SERPL CREATININE-BSD FRML MDRD: 68 ML/MIN/1.73
GLOBULIN UR ELPH-MCNC: 3.2 GM/DL
GLUCOSE BLD-MCNC: 130 MG/DL (ref 65–99)
GLUCOSE BLDC GLUCOMTR-MCNC: 130 MG/DL (ref 70–130)
GLUCOSE BLDC GLUCOMTR-MCNC: 153 MG/DL (ref 70–130)
GLUCOSE BLDC GLUCOMTR-MCNC: 154 MG/DL (ref 70–130)
GLUCOSE BLDC GLUCOMTR-MCNC: 214 MG/DL (ref 70–130)
HBA1C MFR BLD: 7.7 % (ref 4.8–5.6)
HCT VFR BLD AUTO: 31.4 % (ref 35.6–45.5)
HGB BLD-MCNC: 10.2 G/DL (ref 11.9–15.5)
IMM GRANULOCYTES # BLD: 0.01 10*3/MM3 (ref 0–0.03)
IMM GRANULOCYTES NFR BLD: 0.2 % (ref 0–0.5)
LYMPHOCYTES # BLD AUTO: 1.8 10*3/MM3 (ref 0.9–4.8)
LYMPHOCYTES NFR BLD AUTO: 33.1 % (ref 19.6–45.3)
MCH RBC QN AUTO: 31.1 PG (ref 26.9–32)
MCHC RBC AUTO-ENTMCNC: 32.5 G/DL (ref 32.4–36.3)
MCV RBC AUTO: 95.7 FL (ref 80.5–98.2)
MONOCYTES # BLD AUTO: 0.67 10*3/MM3 (ref 0.2–1.2)
MONOCYTES NFR BLD AUTO: 12.3 % (ref 5–12)
NEUTROPHILS # BLD AUTO: 2.75 10*3/MM3 (ref 1.9–8.1)
NEUTROPHILS NFR BLD AUTO: 50.7 % (ref 42.7–76)
PLATELET # BLD AUTO: 186 10*3/MM3 (ref 140–500)
PMV BLD AUTO: 11.6 FL (ref 6–12)
POTASSIUM BLD-SCNC: 3.6 MMOL/L (ref 3.5–5.2)
PROT SERPL-MCNC: 6.7 G/DL (ref 6–8.5)
RBC # BLD AUTO: 3.28 10*6/MM3 (ref 3.9–5.2)
SODIUM BLD-SCNC: 141 MMOL/L (ref 136–145)
WBC NRBC COR # BLD: 5.43 10*3/MM3 (ref 4.5–10.7)

## 2018-07-03 PROCEDURE — 63710000001 INSULIN ASPART PER 5 UNITS: Performed by: INTERNAL MEDICINE

## 2018-07-03 PROCEDURE — 80053 COMPREHEN METABOLIC PANEL: CPT | Performed by: INTERNAL MEDICINE

## 2018-07-03 PROCEDURE — 94799 UNLISTED PULMONARY SVC/PX: CPT

## 2018-07-03 PROCEDURE — 83036 HEMOGLOBIN GLYCOSYLATED A1C: CPT | Performed by: INTERNAL MEDICINE

## 2018-07-03 PROCEDURE — 72040 X-RAY EXAM NECK SPINE 2-3 VW: CPT

## 2018-07-03 PROCEDURE — 85025 COMPLETE CBC W/AUTO DIFF WBC: CPT | Performed by: NEUROLOGICAL SURGERY

## 2018-07-03 PROCEDURE — 82962 GLUCOSE BLOOD TEST: CPT

## 2018-07-03 PROCEDURE — 25810000003 SODIUM CHLORIDE 0.9 % WITH KCL 20 MEQ 20-0.9 MEQ/L-% SOLUTION: Performed by: NEUROLOGICAL SURGERY

## 2018-07-03 PROCEDURE — 25010000002 MORPHINE PER 10 MG: Performed by: NEUROLOGICAL SURGERY

## 2018-07-03 PROCEDURE — 99024 POSTOP FOLLOW-UP VISIT: CPT | Performed by: PHYSICIAN ASSISTANT

## 2018-07-03 RX ORDER — CEPHALEXIN 500 MG/1
500 CAPSULE ORAL 2 TIMES DAILY
Qty: 10 CAPSULE | Refills: 0 | COMMUNITY
Start: 2018-07-03 | End: 2018-07-20

## 2018-07-03 RX ORDER — CYCLOBENZAPRINE HCL 10 MG
10 TABLET ORAL 3 TIMES DAILY PRN
Qty: 60 TABLET | Refills: 0 | Status: SHIPPED | OUTPATIENT
Start: 2018-07-03

## 2018-07-03 RX ORDER — HYDROCODONE BITARTRATE AND ACETAMINOPHEN 5; 325 MG/1; MG/1
1 TABLET ORAL EVERY 4 HOURS PRN
Qty: 40 TABLET | Refills: 0 | COMMUNITY
Start: 2018-07-03 | End: 2018-07-20 | Stop reason: SDUPTHER

## 2018-07-03 RX ADMIN — METOPROLOL SUCCINATE 75 MG: 50 TABLET, FILM COATED, EXTENDED RELEASE ORAL at 20:25

## 2018-07-03 RX ADMIN — CYCLOBENZAPRINE 10 MG: 10 TABLET, FILM COATED ORAL at 12:05

## 2018-07-03 RX ADMIN — HYDROCODONE BITARTRATE AND ACETAMINOPHEN 1 TABLET: 5; 325 TABLET ORAL at 02:26

## 2018-07-03 RX ADMIN — CYCLOBENZAPRINE 10 MG: 10 TABLET, FILM COATED ORAL at 02:25

## 2018-07-03 RX ADMIN — HYDROCODONE BITARTRATE AND ACETAMINOPHEN 1 TABLET: 5; 325 TABLET ORAL at 15:15

## 2018-07-03 RX ADMIN — LEFLUNOMIDE 20 MG: 20 TABLET ORAL at 20:25

## 2018-07-03 RX ADMIN — POTASSIUM CHLORIDE AND SODIUM CHLORIDE 100 ML/HR: 900; 150 INJECTION, SOLUTION INTRAVENOUS at 17:18

## 2018-07-03 RX ADMIN — MORPHINE SULFATE 2 MG: 2 INJECTION, SOLUTION INTRAMUSCULAR; INTRAVENOUS at 01:45

## 2018-07-03 RX ADMIN — ATORVASTATIN CALCIUM 10 MG: 10 TABLET, FILM COATED ORAL at 17:17

## 2018-07-03 RX ADMIN — CYCLOBENZAPRINE 10 MG: 10 TABLET, FILM COATED ORAL at 20:31

## 2018-07-03 RX ADMIN — INSULIN ASPART 4 UNITS: 100 INJECTION, SOLUTION INTRAVENOUS; SUBCUTANEOUS at 20:31

## 2018-07-03 RX ADMIN — POTASSIUM CHLORIDE AND SODIUM CHLORIDE 100 ML/HR: 900; 150 INJECTION, SOLUTION INTRAVENOUS at 08:24

## 2018-07-03 RX ADMIN — INSULIN ASPART 2 UNITS: 100 INJECTION, SOLUTION INTRAVENOUS; SUBCUTANEOUS at 08:20

## 2018-07-03 RX ADMIN — HYDROCODONE BITARTRATE AND ACETAMINOPHEN 1 TABLET: 5; 325 TABLET ORAL at 08:20

## 2018-07-03 RX ADMIN — HYDROCODONE BITARTRATE AND ACETAMINOPHEN 1 TABLET: 5; 325 TABLET ORAL at 20:31

## 2018-07-03 RX ADMIN — LISINOPRIL 5 MG: 5 TABLET ORAL at 07:04

## 2018-07-03 RX ADMIN — INSULIN ASPART 2 UNITS: 100 INJECTION, SOLUTION INTRAVENOUS; SUBCUTANEOUS at 12:05

## 2018-07-03 RX ADMIN — ROPINIROLE 1 MG: 1 TABLET, FILM COATED ORAL at 20:25

## 2018-07-03 RX ADMIN — HYDROCHLOROTHIAZIDE 12.5 MG: 12.5 CAPSULE, GELATIN COATED ORAL at 06:57

## 2018-07-03 NOTE — PLAN OF CARE
Problem: Patient Care Overview  Goal: Plan of Care Review  Outcome: Ongoing (interventions implemented as appropriate)   07/03/18 1727   Coping/Psychosocial   Plan of Care Reviewed With patient   Plan of Care Review   Progress improving   OTHER   Outcome Summary vital signs stable. LOCX4. no C/O nausea or vomiting. pain well controlled with PRN pain medications. Drain to be D/C'ed tomorrow. D/C home tomorrow.       Problem: Fall Risk (Adult)  Goal: Identify Related Risk Factors and Signs and Symptoms  Outcome: Ongoing (interventions implemented as appropriate)   07/02/18 1838   Fall Risk (Adult)   Related Risk Factors (Fall Risk) neuro disease/injury   Signs and Symptoms (Fall Risk) presence of risk factors     Goal: Absence of Fall  Outcome: Ongoing (interventions implemented as appropriate)      Problem: Pain, Acute (Adult)  Goal: Identify Related Risk Factors and Signs and Symptoms  Outcome: Ongoing (interventions implemented as appropriate)   07/03/18 0601   Pain, Acute (Adult)   Related Risk Factors (Acute Pain) procedure/treatment   Signs and Symptoms (Acute Pain) verbalization of pain descriptors     Goal: Acceptable Pain Control/Comfort Level  Outcome: Ongoing (interventions implemented as appropriate)

## 2018-07-03 NOTE — PROGRESS NOTES
" LOS: 0 days   Primary Care Physician: CECILIA Pena     Subjective  Feels fine denies any difficulty with swallowing.  Stable and okay to discharge home from medical viewpoint.  We discussed restarting her home meds as on admission.  We also discussed the possibility of reducing her diuretic to every other day because of low blood pressure at times.  She'll discuss this with her family physician.    Vital Signs  Body mass index is 29.77 kg/m².  Temp:  [97.5 °F (36.4 °C)-98.3 °F (36.8 °C)] 98 °F (36.7 °C)  Heart Rate:  [72-85] 80  Resp:  [16-20] 18  BP: (107-212)/() 107/69      Objective:  Vital signs: (most recent): Blood pressure 115/68, pulse 86, temperature 97.6 °F (36.4 °C), temperature source Oral, resp. rate 18, height 160 cm (62.99\"), weight 76.2 kg (167 lb 15.9 oz), SpO2 100 %.    HEENT: (Neck incision covered)    Lungs:  Normal effort and normal respiratory rate.  Breath sounds clear to auscultation.    Heart: Normal rate.  Regular rhythm.  S1 normal and S2 normal.                Results Review:    I reviewed the patient's new clinical results.      Results from last 7 days  Lab Units 07/03/18  0451   WBC 10*3/mm3 5.43   HEMOGLOBIN g/dL 10.2*   PLATELETS 10*3/mm3 186       Results from last 7 days  Lab Units 07/03/18  0451 07/02/18  1033   SODIUM mmol/L 141 140   POTASSIUM mmol/L 3.6 3.7   CHLORIDE mmol/L 106 103   CO2 mmol/L 24.8 24.1   BUN mg/dL 11 16   CREATININE mg/dL 0.84 0.95   CALCIUM mg/dL 7.5* 9.0   GLUCOSE mg/dL 130* 165*         Hemoglobin A1C:  Lab Results   Component Value Date    HGBA1C 7.70 (H) 07/03/2018       Glucose Range:  Glucose   Date/Time Value Ref Range Status   07/03/2018 0715 153 (H) 70 - 130 mg/dL Final   07/02/2018 2027 158 (H) 70 - 130 mg/dL Final   07/02/2018 1654 174 (H) 70 - 130 mg/dL Final   07/02/2018 1525 167 (H) 70 - 130 mg/dL Final   07/02/2018 1014 171 (H) 70 - 130 mg/dL Final       Medication Review: Yes    Physical Therapy:    Assessment/Plan     Active " Hospital Problems    Diagnosis Date Noted   • **Cervical spinal stenosis [M48.02] 06/21/2018   • Diabetes mellitus (CMS/[facility]) [E11.9] 07/02/2018   • Hypertension [I10] 07/02/2018   • Rheumatoid arthritis (CMS/[facility]) [M06.9] 07/02/2018      Resolved Hospital Problems    Diagnosis Date Noted Date Resolved   No resolved problems to display.       Assessment & Plan  1. DM 2: Hemoglobin A1c at 7.7, correctional dose NovoLog for now metformin and glimepiride to restart at discharge  2. Hypertension: Immediately after surgery was hypertensive now blood pressure under 140, lisinopril, Toprol, Hydrocort thiazide all ordered  3. Rheumatoid arthritis:  4. Cervical spine stenosis: Post-anterior discectomy and fusion  5. Anemia postoperative: Hemoglobin mild drop  6. Hyperlipidemia: Continue Lipitor    Disposition: Same meds as on admission at discharge stable    Jaida Posadas MD  07/03/18  9:12 AM

## 2018-07-03 NOTE — CONSULTS
CONSULT NOTE    INTERNAL MEDICINE   [facility]       Patient Identification:  Name: Kathy Ramirez  Age: 63 y.o.  Sex: female  :  1955  MRN: [id number]             Date of Consultation:  2018        Primary Care Physician: CECILIA Pena                               Requesting Physician: Dr. Rahman  Reason for Consultation: Management of diabetes and hypertension in a patient who is status post anterior cervical spine surgery    Chief Complaint:  Chronic back pain with pain radiation to her right leg with some numbness and tingling unchanged for about a year along with new neck pain with some numbness and tingling of her right arm and episodic discomfort of the left arm with imaging studies revealing severe cervical spine stenosis    History of Present Illness:   Patient is a 63-year-old female with past medical history as noted below was brought in for elective anterior cervical discectomy and fusion for C-spine stenosis with radiculopathy.  Patient has underlying history of diabetes hypertension and dyslipidemia and was feeling otherwise fine except for the issues involving her back and neck.  Since surgery she is doing well and denies any new symptoms.  Patient denies any fever or denies any chills denies any chest pain or shortness of breath.  Her blood pressure was high after surgery in PACU but responded very well to introduction of her antihypertensive medicines.      Past Medical History:  Past Medical History:   Diagnosis Date   • DDD (degenerative disc disease), lumbar    • Diabetes mellitus (CMS/HCC)     TYPE 2   • Dysrhythmia    • GERD (gastroesophageal reflux disease)    • Herpes     GETS FEVER BLISTERS AT TIMES   • History of hepatitis A     1970   • History of transfusion    • Hyperlipidemia    • Hypertension    • IBS (irritable bowel syndrome)    • Lumbar canal stenosis    • Lumbar radiculopathy    • PONV (postoperative nausea and vomiting)    • Psoriasis    •  Rheumatoid arthritis (CMS/HCC)    • RLS (restless legs syndrome)    • Sinus tachycardia      Past Surgical History:  Past Surgical History:   Procedure Laterality Date   • APPENDECTOMY     •  SECTION      5 csection   • CHOLECYSTECTOMY     • COLONOSCOPY     • HYSTERECTOMY     • KNEE ARTHROPLASTY Left    • KNEE ARTHROPLASTY Right    • WISDOM TOOTH EXTRACTION        Home Meds:  Prescriptions Prior to Admission   Medication Sig Dispense Refill Last Dose   • atorvastatin (LIPITOR) 10 MG tablet 10 mg Every Evening.   2018 at Unknown time   • cyclobenzaprine (FLEXERIL) 10 MG tablet 10 mg As Needed.   2018 at Unknown time   • escitalopram (LEXAPRO) 20 MG tablet 20 mg Every Night.   2018 at Unknown time   • GLIMEPIRIDE PO Take 1 tablet/day by mouth Every Morning.   2018 at Unknown time   • hydrochlorothiazide (MICROZIDE) 12.5 MG capsule Take 12.5 mg by mouth Every Morning.   2018 at Unknown time   • InFLIXimab (REMICADE IV) Infuse  into a venous catheter Every 30 (Thirty) Days.   2018 at Unknown time   • leflunomide (ARAVA) 20 MG tablet 20 mg Every Night.   2018 at Unknown time   • lisinopril (PRINIVIL,ZESTRIL) 5 MG tablet Take 5 mg by mouth Every Morning.   2018 at Unknown time   • omeprazole (priLOSEC) 40 MG capsule Take 40 mg by mouth Every Evening.   2018 at Unknown time   • rOPINIRole (REQUIP) 1 MG tablet Take 1 mg by mouth Every Night.   2018 at Unknown time   • TOPROL XL 50 MG 24 hr tablet 75 mg Every Night.   2018 at Unknown time   • traMADol (ULTRAM) 50 MG tablet Take 50 mg by mouth Every 8 (Eight) Hours As Needed.   2018 at Unknown time   • acyclovir (ZOVIRAX) 400 MG tablet 400 mg As Needed.   More than a month at Unknown time   • METFORMIN HCL PO Take 1 tablet/day by mouth 2 (Two) Times a Day.   2018     Current Meds:     Current Facility-Administered Medications:   •  atorvastatin (LIPITOR) tablet 10 mg, 10 mg, Oral, Q PM,  Robbie Rahman MD  •  cyclobenzaprine (FLEXERIL) tablet 10 mg, 10 mg, Oral, TID PRN, Edel Brower PA-C, 10 mg at 07/02/18 1816  •  dextrose (D50W) solution 25 g, 25 g, Intravenous, Q15 Min PRN, Jawed MD Harsha  •  dextrose (GLUTOSE) oral gel 15 g, 15 g, Oral, Q15 Min PRN, Jawed MD Harsha  •  glucagon (human recombinant) (GLUCAGEN DIAGNOSTIC) injection 1 mg, 1 mg, Subcutaneous, PRN, Jawed MD Harsha  •  [START ON 7/3/2018] hydrochlorothiazide (MICROZIDE) capsule 12.5 mg, 12.5 mg, Oral, QAM, Robbie Rahman MD  •  HYDROcodone-acetaminophen (NORCO) 5-325 MG per tablet 1 tablet, 1 tablet, Oral, Q4H PRN, Edel Brower PA-C  •  insulin aspart (novoLOG) injection 0-9 Units, 0-9 Units, Subcutaneous, 4x Daily With Meals & Nightly, Jawgavin Mcleod MD  •  lactated ringers infusion, 100 mL/hr, Intravenous, Continuous, Kalpana Mckeon CRNA, Last Rate: 100 mL/hr at 07/02/18 1731, 100 mL/hr at 07/02/18 1731  •  leflunomide (ARAVA) tablet 20 mg, 20 mg, Oral, Nightly, Robbie Rahman MD  •  [START ON 7/3/2018] lisinopril (PRINIVIL,ZESTRIL) tablet 5 mg, 5 mg, Oral, QAM, Robbie Rahman MD  •  metoprolol succinate XL (TOPROL-XL) 24 hr tablet 75 mg, 75 mg, Oral, Nightly, Robbie Rahman MD  •  morphine injection 2 mg, 2 mg, Intravenous, Q4H PRN, 2 mg at 07/02/18 1711 **AND** naloxone (NARCAN) injection 0.4 mg, 0.4 mg, Intravenous, Q5 Min PRN, Robbie Rahman MD  •  ondansetron (ZOFRAN) tablet 4 mg, 4 mg, Oral, Q6H PRN **OR** ondansetron ODT (ZOFRAN-ODT) disintegrating tablet 4 mg, 4 mg, Oral, Q6H PRN **OR** ondansetron (ZOFRAN) injection 4 mg, 4 mg, Intravenous, Q6H PRN, Robbie Rahman MD  •  promethazine (PHENERGAN) tablet 12.5 mg, 12.5 mg, Oral, Q6H PRN, Robbie Rahman MD  •  rOPINIRole (REQUIP) tablet 1 mg, 1 mg, Oral, Nightly, Robbie Rahman MD  •  sodium chloride 0.9 % flush 1-10 mL, 1-10 mL, Intravenous, PRN, Robbie Rahman MD  •  sodium chloride 0.9 % with KCl 20 mEq/L infusion, 100 mL/hr, Intravenous,  "Continuous, Robbie Rahman MD  Allergies:  No Known Allergies  Social History:   Social History   Substance Use Topics   • Smoking status: Never Smoker   • Smokeless tobacco: Never Used   • Alcohol use Yes      Comment: rare      Family History:  Family History   Problem Relation Age of Onset   • Cerebral aneurysm Mother    • Leukemia Father         Has pacemaker   • Prostate cancer Father    • Skin cancer Father    • Breast cancer Sister    • Hyperlipidemia Sister    • Hypertension Brother    • Hyperlipidemia Brother    • Irregular heart beat Brother    • Malig Hyperthermia Neg Hx           Review of Systems  See history of present illness and past medical history.    Constitutional: Remarkable for no fever or chills  Cardiovascular: Remarkable for no chest pain or shortness of breath  GI: Remarkable for no nausea vomiting or diarrhea  : Remarkable for no burning in urination frequency urgency  Musculoskeletal: remarkable for back pain and neck pain with pain radiating to the arms and legs.  Neurological: Remarkable for no loss of consciousness continence.      Vitals:   /77 (BP Location: Right arm)   Pulse 76   Temp 97.7 °F (36.5 °C) (Oral)   Resp 19   Ht 160 cm (62.99\")   Wt 76.2 kg (167 lb 15.9 oz)   SpO2 97%   BMI 29.77 kg/m²   I/O:   Intake/Output Summary (Last 24 hours) at 07/02/18 2154  Last data filed at 07/02/18 1600   Gross per 24 hour   Intake             1000 ml   Output               60 ml   Net              940 ml     Exam:  General Appearance:    Alert, cooperative, no distress, appears stated age   Head:    Normocephalic, without obvious abnormality, atraumatic   Eyes:    PERRL, conjunctiva/corneas clear, EOM's intact, both eyes   Ears:    Normal external ear canals, both ears   Nose:   Nares normal, septum midline, mucosa normal, no drainage    or sinus tenderness   Throat:   Lips, tongue, gums normal; oral mucosa pink and moist   Neck:   Cervical collar in place and 2 cervical " surgical site is dressed with drain in place    Back:     Symmetric, no curvature, ROM normal, no CVA tenderness   Lungs:     Clear to auscultation bilaterally, respirations unlabored   Chest Wall:    No tenderness or deformity    Heart:    Regular rate and rhythm, S1 and S2 normal, no murmur, rub   or gallop   Abdomen:     Soft, non-tender, bowel sounds active all four quadrants,     no masses, no hepatomegaly, no splenomegaly   Extremities:   Extremities normal, atraumatic, no cyanosis or edema   Pulses:   Pulses palpable in all extremities; symmetric all extremities   Skin:   Skin color normal, Skin is warm and dry,  no rashes or palpable lesions   Neurologic:   CNII-XII intact, motor strength grossly intact, sensation grossly intact to light touch, no focal deficits noted       Data Review:      I reviewed the patient's new clinical results.        Results from last 7 days  Lab Units 07/02/18  1033   SODIUM mmol/L 140   POTASSIUM mmol/L 3.7   CHLORIDE mmol/L 103   CO2 mmol/L 24.1   BUN mg/dL 16   CREATININE mg/dL 0.95   CALCIUM mg/dL 9.0   GLUCOSE mg/dL 165*       Assessment:  Active Hospital Problems (** Indicates Principal Problem)    Diagnosis Date Noted   • **Cervical spinal stenosis [M48.02] 06/21/2018   • Diabetes mellitus (CMS/[facility]) [E11.9] 07/02/2018     TYPE 2     • Hypertension [I10] 07/02/2018   • Rheumatoid arthritis (CMS/[facility]) [M06.9] 07/02/2018      Resolved Hospital Problems    Diagnosis Date Noted Date Resolved   No resolved problems to display.       Plan:  Cervical spine stenosis status post Anterior cervical discectomy C5 6 with placement of a 7 mm cornerstone peek cage and a 25 mm anterior cervical plate using 4 14 mm screws - on 7/2/2018.  Plan - management of pain, activity guidelines, DVT prophylaxis and discharge disposition per neurosurgery service.  Type 2 diabetes-hold her oral hypoglycemic agents until her oral intake is reliably established.  Check Accu-Cheks and provide her with  moderate dose sliding scale coverage.  Check hemoglobin A1c.    Hypertension-restart her oral antihypertensive agents, monitor her blood pressure and avoid hypotensive episodes.  Keep an eye on her renal function.  History of rheumatoid arthritis-currently in remission continue her Arava and pain medications.     Maggie Mcleod MD   7/2/2018  9:54 PM  Much of this encounter note is an electronic transcription/translation of spoken language to printed text. The electronic translation of spoken language may permit erroneous, or at times, nonsensical words or phrases to be inadvertently transcribed; Although I have reviewed the note for such errors, some may still exist

## 2018-07-03 NOTE — DISCHARGE SUMMARY
Date of admission: July 2, 2018    Date of discharge: July 4, 2018    Hospital course: Ms. Ramirez underwent an uncomplicated ACDF at C5-C6 on July 2.  She tolerated the surgery well and is afebrile with normal vital signs.  She is ambulating and voiding without difficulty.  Her postoperative pain was well controlled with oral pain medications.  She has some mild throat soreness but no swallowing difficulties.  The patient was hesitant about going home on the first day after surgery and requested that she be allowed to stay until Wed July 4th.  She will go home tomorrow morning as long as she is still afebrile and there are no acute complications.  Her drain will be removed at 0700 tomorrow.  I have already discussed her wound care and activity restrictions and follow-up instructions.  Her  has her prescriptions for both Norco and Keflex and have already taken them to the pharmacy.  Her prescription for Flexeril was sent electronically to the pharmacy.    Wound care: Patient may shower daily starting Friday.  May take a bath prior to that as long as the water does not touch the incision.  She was instructed to clean the wound with soap and water daily and call immediately with any fever or chills or incisional redness swelling or drainage or any swallowing or breathing problems.    Activity restrictions: No driving.  No lifting over 5 pounds.  No overhead.  Patient was encouraged to ambulate as much as possible and keep head of bed at least 30° for the first week.    Follow-up: Patient will follow up 2 weeks from surgery as scheduled and call sooner with any questions or concerns.

## 2018-07-03 NOTE — PROGRESS NOTES
Discharge Planning Assessment  [facility]     Patient Name: Kathy Ramirez  MRN: [id number]  Today's Date: 7/3/2018    Admit Date: 7/2/2018          Discharge Needs Assessment     Row Name 07/03/18 1513       Living Environment    Lives With spouse    Name(s) of Who Lives With Patient spouse- Guilherme Ramirez [phone]    Current Living Arrangements home/apartment/condo    Primary Care Provided by self    Provides Primary Care For no one    Family Caregiver if Needed spouse    Able to Return to Prior Arrangements yes       Resource/Environmental Concerns    Resource/Environmental Concerns none    Transportation Concerns car, none       Transition Planning    Patient/Family Anticipates Transition to home with family    Patient/Family Anticipated Services at Transition none    Transportation Anticipated family or friend will provide       Discharge Needs Assessment    Readmission Within the Last 30 Days no previous admission in last 30 days    Concerns to be Addressed no discharge needs identified    Equipment Currently Used at Home none    Anticipated Changes Related to Illness none    Equipment Needed After Discharge none            Discharge Plan     Row Name 07/03/18 3018       Plan    Plan plan is home with spouse    Patient/Family in Agreement with Plan yes    Plan Comments CCP role explained and face sheet verified; emergency contact is spouse Guilherme Ramirez [phone], he will provide transportation at d/c; current pharmacy is [address]; no DME, declined information on living will; plan is home no needs, CCP to follow. ARABELLA Tan        Destination     No service coordination in this encounter.      Durable Medical Equipment     No service coordination in this encounter.      Dialysis/Infusion     No service coordination in this encounter.      Home Medical Care     No service coordination in this encounter.      Social Care     No service coordination in this encounter.        Expected Discharge Date  and Time     Expected Discharge Date Expected Discharge Time    Jul 4, 2018               Demographic Summary     Row Name 07/03/18 1511       General Information    Admission Type observation    Arrived From home    Required Notices Provided Observation Status Notice   BROOKS given 7/3    Reason for Consult discharge planning            Functional Status     Row Name 07/03/18 1512       Functional Status    Usual Activity Tolerance good    Current Activity Tolerance moderate       Functional Status, IADL    Medications independent    Meal Preparation independent    Housekeeping independent    Laundry independent    Shopping independent       Mental Status    General Appearance WDL WDL            Psychosocial    No documentation.           Abuse/Neglect    No documentation.           Legal     Row Name 07/03/18 1512       Financial/Legal    Finance Comments Declined information on living will            Substance Abuse    No documentation.           Patient Forms    No documentation.         Tianna Bernstein, RN

## 2018-07-03 NOTE — PROGRESS NOTES
Doing ok. Pain controlled. No swallowing difficulties, only mild throat soreness. Walking and voiding ok.     AVSS  AA, Ox3  Incision ok  DARY:  10cc now, 40cc last night    ..    Results from last 7 days  Lab Units 07/03/18  0451   WBC 10*3/mm3 5.43   HEMOGLOBIN g/dL 10.2*   HEMATOCRIT % 31.4*   PLATELETS 10*3/mm3 186       POD#1 s/p ACDF C5-6    Pt is not comfortable going home today, would prefer tomorrow. That is fine. I already reviewed all wound care, restrictions, etc with her. D/c summary done, d/c med rec done, her  already has her Rxs for Norco and Keflex.     Will d/c tomorrow am.

## 2018-07-03 NOTE — PLAN OF CARE
Problem: Patient Care Overview  Goal: Plan of Care Review  Outcome: Ongoing (interventions implemented as appropriate)      Problem: Pain, Acute (Adult)  Goal: Identify Related Risk Factors and Signs and Symptoms  Outcome: Ongoing (interventions implemented as appropriate)   07/03/18 0601   Pain, Acute (Adult)   Related Risk Factors (Acute Pain) procedure/treatment   Signs and Symptoms (Acute Pain) verbalization of pain descriptors     Goal: Acceptable Pain Control/Comfort Level  Outcome: Ongoing (interventions implemented as appropriate)   07/03/18 0601   Pain, Acute (Adult)   Acceptable Pain Control/Comfort Level making progress toward outcome       Problem: Laminectomy/Foraminotomy/Discectomy (Adult)  Goal: Anesthesia/Sedation Recovery  Outcome: Ongoing (interventions implemented as appropriate)

## 2018-07-03 NOTE — PLAN OF CARE
Problem: Patient Care Overview  Goal: Plan of Care Review  Outcome: Ongoing (interventions implemented as appropriate)   07/02/18 2150 07/03/18 0601   Coping/Psychosocial   Plan of Care Reviewed With patient --    OTHER   Outcome Summary --  VS WNL, exception SpO2 during sleep; 1L NC applied. c/o neck. Oral and IV opioids, muscle relaxer administered as ordered. Ambulates to  SBA.

## 2018-07-04 VITALS
HEIGHT: 63 IN | WEIGHT: 167.99 LBS | DIASTOLIC BLOOD PRESSURE: 84 MMHG | RESPIRATION RATE: 18 BRPM | SYSTOLIC BLOOD PRESSURE: 152 MMHG | TEMPERATURE: 98.1 F | BODY MASS INDEX: 29.77 KG/M2 | HEART RATE: 92 BPM | OXYGEN SATURATION: 95 %

## 2018-07-04 LAB
BASOPHILS # BLD AUTO: 0.02 10*3/MM3 (ref 0–0.2)
BASOPHILS NFR BLD AUTO: 0.3 % (ref 0–1.5)
DEPRECATED RDW RBC AUTO: 46.3 FL (ref 37–54)
EOSINOPHIL # BLD AUTO: 0.12 10*3/MM3 (ref 0–0.7)
EOSINOPHIL NFR BLD AUTO: 1.5 % (ref 0.3–6.2)
ERYTHROCYTE [DISTWIDTH] IN BLOOD BY AUTOMATED COUNT: 13.4 % (ref 11.7–13)
GLUCOSE BLDC GLUCOMTR-MCNC: 162 MG/DL (ref 70–130)
HCT VFR BLD AUTO: 33.2 % (ref 35.6–45.5)
HGB BLD-MCNC: 10.5 G/DL (ref 11.9–15.5)
IMM GRANULOCYTES # BLD: 0.02 10*3/MM3 (ref 0–0.03)
IMM GRANULOCYTES NFR BLD: 0.3 % (ref 0–0.5)
LYMPHOCYTES # BLD AUTO: 1.88 10*3/MM3 (ref 0.9–4.8)
LYMPHOCYTES NFR BLD AUTO: 24 % (ref 19.6–45.3)
MCH RBC QN AUTO: 30.8 PG (ref 26.9–32)
MCHC RBC AUTO-ENTMCNC: 31.6 G/DL (ref 32.4–36.3)
MCV RBC AUTO: 97.4 FL (ref 80.5–98.2)
MONOCYTES # BLD AUTO: 1 10*3/MM3 (ref 0.2–1.2)
MONOCYTES NFR BLD AUTO: 12.8 % (ref 5–12)
NEUTROPHILS # BLD AUTO: 4.78 10*3/MM3 (ref 1.9–8.1)
NEUTROPHILS NFR BLD AUTO: 61.1 % (ref 42.7–76)
PLATELET # BLD AUTO: 183 10*3/MM3 (ref 140–500)
PMV BLD AUTO: 11.1 FL (ref 6–12)
RBC # BLD AUTO: 3.41 10*6/MM3 (ref 3.9–5.2)
WBC NRBC COR # BLD: 7.82 10*3/MM3 (ref 4.5–10.7)

## 2018-07-04 PROCEDURE — 63710000001 INSULIN ASPART PER 5 UNITS: Performed by: INTERNAL MEDICINE

## 2018-07-04 PROCEDURE — 82962 GLUCOSE BLOOD TEST: CPT

## 2018-07-04 PROCEDURE — 85025 COMPLETE CBC W/AUTO DIFF WBC: CPT | Performed by: PHYSICIAN ASSISTANT

## 2018-07-04 RX ADMIN — HYDROCODONE BITARTRATE AND ACETAMINOPHEN 1 TABLET: 5; 325 TABLET ORAL at 09:31

## 2018-07-04 RX ADMIN — HYDROCHLOROTHIAZIDE 12.5 MG: 12.5 CAPSULE, GELATIN COATED ORAL at 08:13

## 2018-07-04 RX ADMIN — INSULIN ASPART 2 UNITS: 100 INJECTION, SOLUTION INTRAVENOUS; SUBCUTANEOUS at 08:11

## 2018-07-04 RX ADMIN — HYDROCODONE BITARTRATE AND ACETAMINOPHEN 1 TABLET: 5; 325 TABLET ORAL at 00:30

## 2018-07-04 RX ADMIN — HYDROCODONE BITARTRATE AND ACETAMINOPHEN 1 TABLET: 5; 325 TABLET ORAL at 05:37

## 2018-07-04 RX ADMIN — LISINOPRIL 5 MG: 5 TABLET ORAL at 08:11

## 2018-07-04 NOTE — DISCHARGE INSTRUCTIONS
Wound care: Patient may shower daily starting Friday.  May take a bath prior to that as long as the water does not touch the incision.  She was instructed to clean the wound with soap and water daily and call immediately with any fever or chills or incisional redness swelling or drainage or any swallowing or breathing problems.     Activity restrictions: No driving.  No lifting over 5 pounds.  No overhead.  Patient is encouraged to ambulate as much as possible and keep head of bed at least 30° for the first week.

## 2018-07-04 NOTE — PLAN OF CARE
Problem: Patient Care Overview  Goal: Plan of Care Review  Outcome: Ongoing (interventions implemented as appropriate)   07/04/18 0452   Coping/Psychosocial   Plan of Care Reviewed With patient   Plan of Care Review   Progress improving   OTHER   Outcome Summary vitals stable. A/O x3. Pain controlled with pain meds. voiding without difficulty. Dressing to anterior neck dry and intact. No neuro deficits noted. DARY drain with minimum ouput Plan to d/c home today.     Goal: Individualization and Mutuality  Outcome: Ongoing (interventions implemented as appropriate)    Goal: Discharge Needs Assessment  Outcome: Ongoing (interventions implemented as appropriate)    Goal: Interprofessional Rounds/Family Conf  Outcome: Ongoing (interventions implemented as appropriate)      Problem: Fall Risk (Adult)  Goal: Identify Related Risk Factors and Signs and Symptoms  Outcome: Outcome(s) achieved Date Met: 07/04/18    Goal: Absence of Fall  Outcome: Ongoing (interventions implemented as appropriate)      Problem: Pain, Acute (Adult)  Goal: Identify Related Risk Factors and Signs and Symptoms  Outcome: Outcome(s) achieved Date Met: 07/04/18      Problem: Laminectomy/Foraminotomy/Discectomy (Adult)  Goal: Signs and Symptoms of Listed Potential Problems Will be Absent, Minimized or Managed (Laminectomy/Foraminotomy/Discectomy)  Outcome: Ongoing (interventions implemented as appropriate)

## 2018-07-05 ENCOUNTER — TELEPHONE (OUTPATIENT)
Dept: NEUROSURGERY | Facility: CLINIC | Age: 63
End: 2018-07-05

## 2018-07-05 NOTE — PROGRESS NOTES
Subjective   Patient ID: Kathy Ramirez is a 63 y.o. female is here today for follow-up. She is 4 days out from a C5-6 ACDF done on 7/2/2018. She started running a fever of 101.8 on Wednesday with cough. She denies any incision drainage or swelling. Her incision is clean and dry. She reports she was around her grandson who is currently sick. Her fever has since broke as of last night.    History of Present Illness    This patient returns today.  She is doing pretty well but she had a fever a couple of days ago and was concerned about her incision.    The following portions of the patient's history were reviewed and updated as appropriate: allergies, current medications, past family history, past medical history, past social history, past surgical history and problem list.    Review of Systems   Constitutional: Negative for fever.   Respiratory: Negative for chest tightness and shortness of breath.    Cardiovascular: Negative for chest pain.   Musculoskeletal: Positive for neck pain.   All other systems reviewed and are negative.      Objective   Physical Exam   Constitutional: She appears well-developed and well-nourished.     Neurologic Exam    On exam her incision looks fine.  There is no redness no tenderness and no evidence of infection.    Assessment/Plan   Independent Review of Radiographic Studies:      Medical Decision Making:      I told the patient to plan to keep her follow-up appointment for later this month where we can get her started on some physical therapy.    Kathy was seen today for post-op and neck pain.    Diagnoses and all orders for this visit:    Follow-up examination following surgery      Return in about 2 weeks (around 7/20/2018).

## 2018-07-05 NOTE — TELEPHONE ENCOUNTER
Patient had surgery by Dr Rahamn Monday and today has fever of 101.8 she and  are coughing thought you should know of fever please advise [phone]

## 2018-07-05 NOTE — TELEPHONE ENCOUNTER
I called patient back. Patient denies any incisional problems.. No drainage, swelling, or redness. She said her throat is a little sore. She did complain of her chest hurting every now and then but also said he  complained of his chest hurting.

## 2018-07-06 ENCOUNTER — OFFICE VISIT (OUTPATIENT)
Dept: NEUROSURGERY | Facility: CLINIC | Age: 63
End: 2018-07-06

## 2018-07-06 VITALS — DIASTOLIC BLOOD PRESSURE: 83 MMHG | SYSTOLIC BLOOD PRESSURE: 124 MMHG | HEART RATE: 104 BPM

## 2018-07-06 DIAGNOSIS — Z09 FOLLOW-UP EXAMINATION FOLLOWING SURGERY: Primary | ICD-10-CM

## 2018-07-06 PROCEDURE — 99024 POSTOP FOLLOW-UP VISIT: CPT | Performed by: NEUROLOGICAL SURGERY

## 2018-07-10 ENCOUNTER — TELEPHONE (OUTPATIENT)
Dept: NEUROSURGERY | Facility: CLINIC | Age: 63
End: 2018-07-10

## 2018-07-19 NOTE — PROGRESS NOTES
Subjective   Patient ID: Kathy Ramirez is a 63 y.o. female is here today for follow-up. She is 2 weeks from a C5-6 ACDF by Dr. Rahman 7/2/18. She states she still has neck pain and mild shoulder discomfort. Mrs. Ramirez denies any incision problems.  She takes Hydrocodone 5/325 HS and Cyclobenzaprine 10 mg for pain.     Neck Pain    The pain is at a severity of 3/10. The pain is mild.       The following portions of the patient's history were reviewed and updated as appropriate: allergies, current medications, past family history, past medical history, past social history, past surgical history and problem list.    Review of Systems   Genitourinary: Negative for difficulty urinating.   Musculoskeletal: Positive for neck pain and neck stiffness.   All other systems reviewed and are negative.      Objective   Physical Exam   Neurological:   Incision ok     Neurologic Exam    Assessment/Plan   Independent Review of Radiographic Studies:      Medical Decision Making:    Ms. Ramirez is 2 weeks out from an ACDF at C5-C6.  She is doing very well but still has the expected posterior neck pain/interscapular pain and some mild left shoulder discomfort.  Her incision is healing nicely without signs of infection.  She is taking pain medication only at bedtime.  She we will begin physical therapy.  We did discuss her restrictions.  She will follow-up in one month with new x-rays.  She will stay off work for now.  In addition she can resume her Remicade 1 month from surgery.  Kathy was seen today for post-op.    Diagnoses and all orders for this visit:    Surgery follow-up examination  -     Ambulatory Referral to Physical Therapy Evaluate and treat (2-3 times per week for 4 wks. )  -     XR spine cervical 2 or 3 vw; Future      Return in about 4 weeks (around 8/17/2018) for with Dr Rahman.

## 2018-07-20 ENCOUNTER — OFFICE VISIT (OUTPATIENT)
Dept: NEUROSURGERY | Facility: CLINIC | Age: 63
End: 2018-07-20

## 2018-07-20 VITALS
WEIGHT: 167 LBS | HEIGHT: 63 IN | SYSTOLIC BLOOD PRESSURE: 129 MMHG | HEART RATE: 81 BPM | DIASTOLIC BLOOD PRESSURE: 81 MMHG | BODY MASS INDEX: 29.59 KG/M2

## 2018-07-20 DIAGNOSIS — Z09 SURGERY FOLLOW-UP EXAMINATION: Primary | ICD-10-CM

## 2018-07-20 PROBLEM — M48.02 CERVICAL SPINAL STENOSIS: Status: RESOLVED | Noted: 2018-06-21 | Resolved: 2018-07-20

## 2018-07-20 PROCEDURE — 99024 POSTOP FOLLOW-UP VISIT: CPT | Performed by: PHYSICIAN ASSISTANT

## 2018-07-20 RX ORDER — HYDROCODONE BITARTRATE AND ACETAMINOPHEN 5; 325 MG/1; MG/1
1 TABLET ORAL EVERY 6 HOURS PRN
Qty: 20 TABLET | Refills: 0 | Status: SHIPPED | OUTPATIENT
Start: 2018-07-20 | End: 2020-03-11 | Stop reason: HOSPADM

## 2018-07-30 ENCOUNTER — TELEPHONE (OUTPATIENT)
Dept: NEUROSURGERY | Facility: CLINIC | Age: 63
End: 2018-07-30

## 2018-07-30 ENCOUNTER — OFFICE VISIT (OUTPATIENT)
Dept: NEUROSURGERY | Facility: CLINIC | Age: 63
End: 2018-07-30

## 2018-07-30 VITALS
WEIGHT: 167 LBS | HEIGHT: 63 IN | HEART RATE: 80 BPM | BODY MASS INDEX: 29.59 KG/M2 | DIASTOLIC BLOOD PRESSURE: 87 MMHG | SYSTOLIC BLOOD PRESSURE: 136 MMHG

## 2018-07-30 DIAGNOSIS — Z09 SURGERY FOLLOW-UP EXAMINATION: Primary | ICD-10-CM

## 2018-07-30 PROCEDURE — 99024 POSTOP FOLLOW-UP VISIT: CPT | Performed by: PHYSICIAN ASSISTANT

## 2018-07-30 RX ORDER — SULFAMETHOXAZOLE AND TRIMETHOPRIM 800; 160 MG/1; MG/1
1 TABLET ORAL 2 TIMES DAILY
Qty: 14 TABLET | Refills: 0 | Status: SHIPPED | OUTPATIENT
Start: 2018-07-30 | End: 2018-08-07 | Stop reason: SDUPTHER

## 2018-07-30 NOTE — PROGRESS NOTES
"Subjective   Patient ID: Kathy Ramirez is a 63 y.o. female is here today for wound check.  She is 3 weeks out from an ACDF at C5-C6 by  7/2/18. She states she noticed a low grade temp and two large marble sized bumps over the incision last week. She did not call the office to report incision issue because it was just a low grade temp. She states they started draining last night with a clear sticky like \"felicitas' color drainage.   Wound Check   The maximum temperature noted was less than 100.4 F (97.2). There has been colored discharge from the wound. There is new redness present. There is new swelling present.       The following portions of the patient's history were reviewed and updated as appropriate: allergies, current medications, past family history, past medical history, past social history, past surgical history and problem list.    Review of Systems   All other systems reviewed and are negative.      Objective   Physical Exam   Neurological:   The incision does have some localized erythema and swelling along the lateral most portion of the wound.  I was unable to express any drainage.  No significant tenderness or warmth.     Neurologic Exam    Assessment/Plan   Independent Review of Radiographic Studies:      Medical Decision Making:    Ms. Ramirez is 3 weeks out from an ACDF at C5-C6.  She called this morning because she noticed some increased swelling and subsequent drainage from the midportion of her wound over the weekend.  She states that there was a sudden area of swelling in the middle of the wound that subsequently \"popped.\"  She states that it drained a little bit of serous sanguinous fluid this morning but I was unable to express any drainage today on exam.  She does have an area of erythema on the lateral most portion of the wound.  I think she may have had a retained suture that has subsequently been expressed.  We discussed wound care and she will clean her wound with hydrogen peroxide 3 times " a day.  She will also begin taking Bactrim twice a day and will call if she experiences any further drainage increased swelling fever or chills or increased tenderness.  Otherwise she will follow-up on Thursday for a wound check.  Kathy was seen today for wound check.    Diagnoses and all orders for this visit:    Surgery follow-up examination  -     sulfamethoxazole-trimethoprim (BACTRIM DS) 800-160 MG per tablet; Take 1 tablet by mouth 2 (Two) Times a Day.      Return in about 3 days (around 8/2/2018).

## 2018-07-30 NOTE — TELEPHONE ENCOUNTER
Patient had surgery 7/2/2018 with Dr. Rahman. I called the patient and she states she had low grade fevers last week since has since subsided. She took a shower last night and she noticed she had 2 Shooter marble ( Larger size knots) that had draining it was clear thick sticky substance. It continued to drain all night on her PJ's, Today she states the knots are smaller marble size and have not drained much at all today. I told her she will more than likely need to come into the office for a wound check. Verbally spoke with Edel she needs to be seen in the office as soon as she can make it here. I called the patient back and advised her to come to the office now. She states she lives in [place] but will leave right now to come in for wound check.

## 2018-08-01 NOTE — PROGRESS NOTES
Subjective   Patient ID: Kathy Ramirze is a 63 y.o. female is here today after new xray and follow-up for wound check. She is 6 weeks out from an ACDF at C5-C6 done on 7/2/18. Her wound is healing well. She states she has 3 more doses of bactrim to complete.      History of Present Illness    This patient returns today.  Her incision looks okay at this point.  She is feeling really good with just a little stiffness in her shoulders.  She did not do therapy.    The following portions of the patient's history were reviewed and updated as appropriate: allergies, current medications, past family history, past medical history, past social history, past surgical history and problem list.    Review of Systems   Constitutional: Negative for chills and fever.   Respiratory: Negative for chest tightness and shortness of breath.    Cardiovascular: Negative for chest pain.   Musculoskeletal: Negative for neck pain and neck stiffness.   All other systems reviewed and are negative.      Objective   Physical Exam   Constitutional: She is oriented to person, place, and time. She appears well-developed and well-nourished.   Neurological: She is oriented to person, place, and time.     Neurologic Exam     Mental Status   Oriented to person, place, and time.       Assessment/Plan   Independent Review of Radiographic Studies:      I reviewed her x-rays which were done earlier today.  This shows good alignment of the construct.    Medical Decision Making:      I told the patient that she can go back to work tomorrow without restriction.  She should go ahead and try a little physical therapy just so she doesn't injure something going back to normal activities.  We will check her again in about 3 weeks for a wound check.    Kathy was seen today for post-op and wound check.    Diagnoses and all orders for this visit:    Surgery follow-up examination  -     Ambulatory Referral to Physical Therapy      Return in about 3 weeks (around  9/6/2018).

## 2018-08-02 ENCOUNTER — OFFICE VISIT (OUTPATIENT)
Dept: NEUROSURGERY | Facility: CLINIC | Age: 63
End: 2018-08-02

## 2018-08-02 VITALS — SYSTOLIC BLOOD PRESSURE: 143 MMHG | HEART RATE: 75 BPM | DIASTOLIC BLOOD PRESSURE: 73 MMHG

## 2018-08-02 DIAGNOSIS — Z09 SURGERY FOLLOW-UP EXAMINATION: Primary | ICD-10-CM

## 2018-08-02 PROCEDURE — 99024 POSTOP FOLLOW-UP VISIT: CPT | Performed by: NEUROLOGICAL SURGERY

## 2018-08-06 NOTE — PROGRESS NOTES
Subjective   Patient ID: Kathy Ramirez is a 63 y.o. female is here today for follow-up for wound check.  She is 5 weeks out from an ACDF at C5-C6 done on 7/2/18.Today her incision is still improving. Her swelling has decreased since her last visit and the knots are smaller. She states she does have minimal drainage particularly at night.    History of Present Illness    The patient returns today.  Her incision definitely looks better than last week.  There is still a little bit of clearish drainage but the redness is better and the swelling is better.  Overall it appears to be going away but she just took her last dose of antibiotics last night.    The following portions of the patient's history were reviewed and updated as appropriate: allergies, current medications, past family history, past medical history, past social history, past surgical history and problem list.    Review of Systems   Constitutional: Negative for fever.   Respiratory: Negative for chest tightness and shortness of breath.    Cardiovascular: Negative for chest pain.   Skin: Positive for wound ( Improving).   All other systems reviewed and are negative.      Objective   Physical Exam   Constitutional: She is oriented to person, place, and time. She appears well-developed and well-nourished.   Neurological: She is oriented to person, place, and time.     Neurologic Exam     Mental Status   Oriented to person, place, and time.       Assessment/Plan   Independent Review of Radiographic Studies:      Medical Decision Making:      Old the patient we will check her again in a week.  I think to be safe we should keep her on the antibiotics for another week.    Kathy was seen today for post-op.    Diagnoses and all orders for this visit:    Surgery follow-up examination    Other orders  -     sulfamethoxazole-trimethoprim (BACTRIM DS) 800-160 MG per tablet; Take 1 tablet by mouth Every 12 (Twelve) Hours.      Return in about 1 week (around 8/14/2018).

## 2018-08-07 ENCOUNTER — OFFICE VISIT (OUTPATIENT)
Dept: NEUROSURGERY | Facility: CLINIC | Age: 63
End: 2018-08-07

## 2018-08-07 VITALS — HEART RATE: 103 BPM | DIASTOLIC BLOOD PRESSURE: 76 MMHG | SYSTOLIC BLOOD PRESSURE: 118 MMHG

## 2018-08-07 DIAGNOSIS — Z09 SURGERY FOLLOW-UP EXAMINATION: Primary | ICD-10-CM

## 2018-08-07 PROCEDURE — 99024 POSTOP FOLLOW-UP VISIT: CPT | Performed by: NEUROLOGICAL SURGERY

## 2018-08-07 RX ORDER — SULFAMETHOXAZOLE AND TRIMETHOPRIM 800; 160 MG/1; MG/1
1 TABLET ORAL EVERY 12 HOURS
Qty: 14 TABLET | Refills: 0 | Status: SHIPPED | OUTPATIENT
Start: 2018-08-07 | End: 2018-11-08

## 2018-08-16 ENCOUNTER — HOSPITAL ENCOUNTER (OUTPATIENT)
Dept: GENERAL RADIOLOGY | Facility: HOSPITAL | Age: 63
Discharge: HOME | End: 2018-08-16
Admitting: PHYSICIAN ASSISTANT

## 2018-08-16 ENCOUNTER — OFFICE VISIT (OUTPATIENT)
Dept: NEUROSURGERY | Facility: CLINIC | Age: 63
End: 2018-08-16

## 2018-08-16 VITALS
HEART RATE: 91 BPM | BODY MASS INDEX: 29.59 KG/M2 | HEIGHT: 63 IN | SYSTOLIC BLOOD PRESSURE: 129 MMHG | WEIGHT: 167 LBS | DIASTOLIC BLOOD PRESSURE: 81 MMHG

## 2018-08-16 DIAGNOSIS — Z09 SURGERY FOLLOW-UP EXAMINATION: ICD-10-CM

## 2018-08-16 DIAGNOSIS — Z09 SURGERY FOLLOW-UP EXAMINATION: Primary | ICD-10-CM

## 2018-08-16 PROCEDURE — 72040 X-RAY EXAM NECK SPINE 2-3 VW: CPT

## 2018-08-16 PROCEDURE — 99024 POSTOP FOLLOW-UP VISIT: CPT | Performed by: NEUROLOGICAL SURGERY

## 2018-09-06 ENCOUNTER — OFFICE VISIT (OUTPATIENT)
Dept: NEUROSURGERY | Facility: CLINIC | Age: 63
End: 2018-09-06

## 2018-09-06 VITALS — HEART RATE: 96 BPM | DIASTOLIC BLOOD PRESSURE: 76 MMHG | SYSTOLIC BLOOD PRESSURE: 117 MMHG

## 2018-09-06 DIAGNOSIS — Z09 SURGERY FOLLOW-UP EXAMINATION: Primary | ICD-10-CM

## 2018-09-06 PROCEDURE — 99024 POSTOP FOLLOW-UP VISIT: CPT | Performed by: NEUROLOGICAL SURGERY

## 2018-10-04 NOTE — PROGRESS NOTES
Subjective   Patient ID: Kathy Ramirez is a 63 y.o. female is here today for follow-up with a new Cervical XR. She is a little over 3 months out from an ACDF at C5-C6 done on 7/2/18. She has some intermittent neck pain, but states that her pain is worse in the left shoulder.     History of Present Illness    This patient had been doing quite well until about a month ago when she began to notice severe pain in her left neck with some radiation down her left arm and into the middle fingers of the left hand.  The pain is really severe particularly in her left shoulder.    The following portions of the patient's history were reviewed and updated as appropriate: allergies, current medications, past family history, past medical history, past social history, past surgical history and problem list.    Review of Systems   Respiratory: Negative for chest tightness and shortness of breath.    Cardiovascular: Negative for chest pain.   Musculoskeletal: Positive for neck pain.   Neurological: Positive for numbness. Negative for weakness.   All other systems reviewed and are negative.      Objective   Physical Exam   Constitutional: She is oriented to person, place, and time. She appears well-developed and well-nourished.   Eyes: Pupils are equal, round, and reactive to light. EOM are normal.   Neurological: She is oriented to person, place, and time. She has a normal Finger-Nose-Finger Test and a normal Heel to Shin Test. Gait normal.   Reflex Scores:       Tricep reflexes are 2+ on the right side and 2+ on the left side.       Bicep reflexes are 2+ on the right side and 2+ on the left side.       Brachioradialis reflexes are 2+ on the right side and 2+ on the left side.       Patellar reflexes are 2+ on the right side and 2+ on the left side.       Achilles reflexes are 2+ on the right side and 2+ on the left side.  Psychiatric: Her speech is normal.     Neurologic Exam     Mental Status   Oriented to person, place, and time.    Registration of memory: Good recent and remote memory.   Attention: normal. Concentration: normal.   Speech: speech is normal   Level of consciousness: alert  Knowledge: consistent with education.     Cranial Nerves     CN II   Visual fields full to confrontation.   Visual acuity: normal    CN III, IV, VI   Pupils are equal, round, and reactive to light.  Extraocular motions are normal.     CN V   Facial sensation intact.   Right corneal reflex: normal  Left corneal reflex: normal    CN VII   Facial expression full, symmetric.   Right facial weakness: none  Left facial weakness: none    CN VIII   Hearing: intact    CN IX, X   Palate: symmetric    CN XI   Right sternocleidomastoid strength: normal  Left sternocleidomastoid strength: normal    CN XII   Tongue: not atrophic  Tongue deviation: none    Motor Exam   Muscle bulk: normal  Right arm tone: normal  Left arm tone: normal  Right leg tone: normal  Left leg tone: normal    Strength   Strength 5/5 except as noted.     Sensory Exam   Light touch normal.     Gait, Coordination, and Reflexes     Gait  Gait: normal    Coordination   Finger to nose coordination: normal  Heel to shin coordination: normal    Reflexes   Right brachioradialis: 2+  Left brachioradialis: 2+  Right biceps: 2+  Left biceps: 2+  Right triceps: 2+  Left triceps: 2+  Right patellar: 2+  Left patellar: 2+  Right achilles: 2+  Left achilles: 2+  Right : 2+  Left : 2+      Assessment/Plan   Independent Review of Radiographic Studies:      I reviewed a set of x-rays from October 16 myself.  This shows good alignment at C5 6 with what appears to be good early fusion.    Medical Decision Making:      I told the patient I thought we should go ahead and check a MRI of the cervical spine just to be sure something hasn't happened at one of the other levels.  I will see her back is been completed.    Kathy was seen today for neck pain.    Diagnoses and all orders for this visit:    Cervical  radiculitis  -     MRI Cervical Spine Without Contrast; Future      Return for After radiology test.

## 2018-10-15 ENCOUNTER — TELEPHONE (OUTPATIENT)
Dept: NEUROSURGERY | Facility: CLINIC | Age: 63
End: 2018-10-15

## 2018-10-15 NOTE — TELEPHONE ENCOUNTER
Called to remind patient to get their XR done before their appointment with Dr. Rahman on 10/18/2018. I had to LVM regarding XR.

## 2018-10-16 ENCOUNTER — HOSPITAL ENCOUNTER (OUTPATIENT)
Dept: GENERAL RADIOLOGY | Facility: HOSPITAL | Age: 63
Discharge: HOME | End: 2018-10-16
Attending: NEUROLOGICAL SURGERY | Admitting: NEUROLOGICAL SURGERY

## 2018-10-16 DIAGNOSIS — Z09 SURGERY FOLLOW-UP EXAMINATION: ICD-10-CM

## 2018-10-16 PROCEDURE — 72052 X-RAY EXAM NECK SPINE 6/>VWS: CPT

## 2018-10-18 ENCOUNTER — OFFICE VISIT (OUTPATIENT)
Dept: NEUROSURGERY | Facility: CLINIC | Age: 63
End: 2018-10-18

## 2018-10-18 VITALS
DIASTOLIC BLOOD PRESSURE: 78 MMHG | SYSTOLIC BLOOD PRESSURE: 144 MMHG | BODY MASS INDEX: 29.59 KG/M2 | HEIGHT: 63 IN | HEART RATE: 75 BPM | WEIGHT: 167 LBS

## 2018-10-18 DIAGNOSIS — M54.12 CERVICAL RADICULITIS: Primary | ICD-10-CM

## 2018-10-18 PROCEDURE — 99213 OFFICE O/P EST LOW 20 MIN: CPT | Performed by: NEUROLOGICAL SURGERY

## 2018-11-02 ENCOUNTER — TELEPHONE (OUTPATIENT)
Dept: NEUROSURGERY | Facility: CLINIC | Age: 63
End: 2018-11-02

## 2018-11-02 NOTE — TELEPHONE ENCOUNTER
Pt had MRI at [facility] on 10/29/18. She is scheduled to come back her on 11/20/18. She is complaining of worsening shoulder pain. Should she see ortho? What should she do now?

## 2018-11-08 ENCOUNTER — OFFICE VISIT (OUTPATIENT)
Dept: NEUROSURGERY | Facility: CLINIC | Age: 63
End: 2018-11-08

## 2018-11-08 VITALS
BODY MASS INDEX: 29.59 KG/M2 | DIASTOLIC BLOOD PRESSURE: 86 MMHG | HEART RATE: 78 BPM | HEIGHT: 63 IN | SYSTOLIC BLOOD PRESSURE: 159 MMHG | WEIGHT: 167 LBS

## 2018-11-08 DIAGNOSIS — M54.12 CERVICAL RADICULITIS: Primary | ICD-10-CM

## 2018-11-08 DIAGNOSIS — M25.512 ACUTE PAIN OF LEFT SHOULDER: ICD-10-CM

## 2018-11-08 PROBLEM — Z09 SURGERY FOLLOW-UP EXAMINATION: Status: RESOLVED | Noted: 2018-07-20 | Resolved: 2018-11-08

## 2018-11-08 PROCEDURE — 99213 OFFICE O/P EST LOW 20 MIN: CPT | Performed by: NEUROLOGICAL SURGERY

## 2018-11-08 NOTE — PROGRESS NOTES
Subjective   Patient ID: Kathy Ramirez is a 63 y.o. female is here today for follow-up after new cervical MRI.  She complains of constant left shoulder pain. She tried ice with mild relief.      History of Present Illness     This patient continues with severe pain in her left shoulder primarily.  There is not a lot of radiation down her arm.  The pain seems to be getting worse daily    The following portions of the patient's history were reviewed and updated as appropriate: allergies, current medications, past family history, past medical history, past social history, past surgical history and problem list.    Review of Systems   Respiratory: Negative for shortness of breath.    Cardiovascular: Negative for chest pain.   Musculoskeletal: Negative for neck pain.        Left shoulder   All other systems reviewed and are negative.      Objective   Physical Exam   Constitutional: She is oriented to person, place, and time. She appears well-developed and well-nourished.   Neurological: She is oriented to person, place, and time.     Neurologic Exam     Mental Status   Oriented to person, place, and time.       Assessment/Plan   Independent Review of Radiographic Studies:      I reviewed an MRI of her cervical spine [facility].  This shows a widely patent canal and neural foramina at C2 3.  C3 4 is also pretty open.  C4 5 shows some right-sided foraminal stenosis and some mild central stenosis.  C5 6 looks okay as does C6 7 and C7-T1.    Medical Decision Making:      Told the patient about the imaging.  I told her that from my point of view I don't see anything here that is changed since April and I really don't see much narrowing on the left side at all area I think we should get her shoulder checked out by an orthopedic surgeon.  If that looks totally normal then we will have to consider her for a cervical myelogram.  She was also concerned bit about some heat and cold sensations in her feet and some  urinary urgency.  I did review her MRI from April of this year of the lumbar spine and this does show a little stenosis at L34 but not severe enough to cause a cauda equina syndrome.  In addition she has no other symptoms related to her lower back.  She will call me after she sees the orthopedic surgeon.    Kathy was seen today for shoulder pain and neck pain.    Diagnoses and all orders for this visit:    Cervical radiculitis    Acute pain of left shoulder  -     Ambulatory Referral to Orthopedic Surgery      Return for Recheck and call after treatment or consultation.

## 2018-12-10 ENCOUNTER — OFFICE VISIT (OUTPATIENT)
Dept: ORTHOPEDIC SURGERY | Facility: CLINIC | Age: 63
End: 2018-12-10

## 2018-12-10 VITALS — WEIGHT: 167 LBS | BODY MASS INDEX: 30.73 KG/M2 | HEIGHT: 62 IN | TEMPERATURE: 98.8 F

## 2018-12-10 DIAGNOSIS — M25.512 ACUTE PAIN OF LEFT SHOULDER: Primary | ICD-10-CM

## 2018-12-10 DIAGNOSIS — M75.102 TEAR OF LEFT ROTATOR CUFF, UNSPECIFIED TEAR EXTENT: ICD-10-CM

## 2018-12-10 PROCEDURE — 99204 OFFICE O/P NEW MOD 45 MIN: CPT | Performed by: ORTHOPAEDIC SURGERY

## 2018-12-10 PROCEDURE — 73030 X-RAY EXAM OF SHOULDER: CPT | Performed by: ORTHOPAEDIC SURGERY

## 2018-12-10 PROCEDURE — 20610 DRAIN/INJ JOINT/BURSA W/O US: CPT | Performed by: ORTHOPAEDIC SURGERY

## 2018-12-10 RX ADMIN — METHYLPREDNISOLONE ACETATE 80 MG: 80 INJECTION, SUSPENSION INTRA-ARTICULAR; INTRALESIONAL; INTRAMUSCULAR; SOFT TISSUE at 15:37

## 2018-12-10 RX ADMIN — LIDOCAINE HYDROCHLORIDE 4 ML: 10 INJECTION, SOLUTION EPIDURAL; INFILTRATION; INTRACAUDAL; PERINEURAL at 15:37

## 2018-12-10 NOTE — PROGRESS NOTES
New Shoulder      Patient: Kathy Ramirez        YOB: 1955    Medical Record Number: [id number]        Chief Complaint   Patient presents with   • Left Shoulder - Establish Care, Pain           History of Present Illness: This is a  63-year-old female who presents complaining of left shoulder pain she is right-hand-dominant spent ongoing a couple months was constant now intermittent she does have night pain she's decreased range of motion no history of similar symptoms.  Her symptoms are moderate severe intermittent grinding aching stabbing clicking worse with driving better with ice she is a cancer Center specialist past medical history for diabetes rheumatoid arthritis and colitis      Allergies: No Known Allergies    Medications:   Home Medications:  Current Outpatient Medications on File Prior to Visit   Medication Sig   • acyclovir (ZOVIRAX) 400 MG tablet 400 mg As Needed.   • atorvastatin (LIPITOR) 10 MG tablet 10 mg Every Evening.   • cyclobenzaprine (FLEXERIL) 10 MG tablet Take 1 tablet by mouth 3 (Three) Times a Day As Needed for Muscle Spasms.   • escitalopram (LEXAPRO) 20 MG tablet 20 mg Every Night.   • GLIMEPIRIDE PO Take 1 tablet/day by mouth Every Morning.   • hydrochlorothiazide (MICROZIDE) 12.5 MG capsule Take 12.5 mg by mouth Every Morning.   • InFLIXimab (REMICADE IV) Infuse  into a venous catheter Every 30 (Thirty) Days.   • leflunomide (ARAVA) 20 MG tablet 20 mg Every Night.   • lisinopril (PRINIVIL,ZESTRIL) 5 MG tablet Take 5 mg by mouth Every Morning.   • METFORMIN HCL PO Take 1 tablet/day by mouth 2 (Two) Times a Day.   • omeprazole (priLOSEC) 40 MG capsule Take 40 mg by mouth Every Evening.   • rOPINIRole (REQUIP) 1 MG tablet Take 1 mg by mouth Every Night.   • TOPROL XL 50 MG 24 hr tablet 75 mg Every Night.   • HYDROcodone-acetaminophen (NORCO) 5-325 MG per tablet Take 1 tablet by mouth Every 6 (Six) Hours As Needed for Moderate Pain .     No current facility-administered  medications on file prior to visit.      Current Medications:  Scheduled Meds:  Continuous Infusions:  No current facility-administered medications for this visit.   PRN Meds:.    Past Medical History:   Diagnosis Date   • DDD (degenerative disc disease), lumbar    • Diabetes mellitus (CMS/HCC)     TYPE 2   • Dysrhythmia    • GERD (gastroesophageal reflux disease)    • Herpes     GETS FEVER BLISTERS AT TIMES   • History of hepatitis A        • History of transfusion    • Hyperlipidemia    • Hypertension    • IBS (irritable bowel syndrome)    • Lumbar canal stenosis    • Lumbar radiculopathy    • PONV (postoperative nausea and vomiting)    • Psoriasis    • Rheumatoid arthritis (CMS/HCC)    • RLS (restless legs syndrome)    • Sinus tachycardia         Past Surgical History:   Procedure Laterality Date   • APPENDECTOMY     •  SECTION      5 csection   • CHOLECYSTECTOMY     • COLONOSCOPY     • HYSTERECTOMY     • KNEE ARTHROPLASTY Left    • KNEE ARTHROPLASTY Right    • WISDOM TOOTH EXTRACTION          Social History     Occupational History   • Occupation: Cancer    Tobacco Use   • Smoking status: Never Smoker   • Smokeless tobacco: Never Used   Substance and Sexual Activity   • Alcohol use: Yes     Comment: rare   • Drug use: No   • Sexual activity: Defer      Social History     Social History Narrative   • Not on file        Family History   Problem Relation Age of Onset   • Cerebral aneurysm Mother    • Leukemia Father         Has pacemaker   • Prostate cancer Father    • Skin cancer Father    • Breast cancer Sister    • Hyperlipidemia Sister    • Hypertension Brother    • Hyperlipidemia Brother    • Irregular heart beat Brother    • Malig Hyperthermia Neg Hx              Review of Systems: HEENT point review of systems are remarkable for the pertinent positives listed in the chart by the patient the remainder are negative    Review of Systems      Physical  "Exam: 63 y.o. female  General Appearance:    Alert, cooperative, in no acute distress                   Vitals:    12/10/18 1444   Temp: 98.8 °F (37.1 °C)   Weight: 75.8 kg (167 lb)   Height: 157.5 cm (62\")      Patient is alert and read ×3 no acute distress appears her above-listed at height weight and age.  Affect is normal respiratory rate is normal unlabored. Heart rate regular rate rhythm, sclera, dentition and hearing are normal for the purpose of this exam.    Ortho Exam  Physical exam of the left shoulder reveals no overlying skin changes no lymphedema no lymphadenopathy.  Patient has active flexion 180 with mild symptoms abduction is similar external rotation is to 50 and internal rotation to the upper lumbar spine with mild symptoms.  Patient has good rotator cuff strength 4 over 5 with isometric strength testing with pain.  Patient has a positive impingement and a positive Camarena sign.  Patient has good cervical range of motion which is full and asymptomatic no radicular symptoms.  Patient has a normal elbow exam.  Good distal pulses are presentPatient has pain with overhead activity and a positive Neer sign and a positive empty can sign  They have a positive drop arm any definitive painful arc    Large Joint Arthrocentesis: L subacromial bursa  Date/Time: 12/10/2018 3:37 PM  Consent given by: patient  Site marked: site marked  Timeout: Immediately prior to procedure a time out was called to verify the correct patient, procedure, equipment, support staff and site/side marked as required   Supporting Documentation  Indications: pain   Procedure Details  Location: shoulder - L subacromial bursa  Preparation: Patient was prepped and draped in the usual sterile fashion  Needle size: 22 G  Approach: posterior  Medications administered: 80 mg methylPREDNISolone acetate 80 MG/ML; 4 mL lidocaine PF 1% 1 %  Patient tolerance: patient tolerated the procedure well with no immediate " complications                Radiology:   AP, Scapular Y and Axillary Lateral of the left shoulder were ordered/reviewed to evauate shoulder pain.  I've no comparative films these show some mild acromioclavicular arthritis otherwise heads well-seated within the glenoid  Imaging Results (most recent)     Procedure Component Value Units Date/Time    XR Shoulder 2+ View Left [[id number]] Resulted:  12/10/18 1424     Updated:  12/10/18 1424    Impression:       Ordering physician's impression is located in the Encounter Note dated 12/10/18. X-ray performed in the DR room.          Assessment/Plan: Left shoulder pain which is severe in nature she does have some weakness in rotator cuff I'm highly suspicious of rotator cuff pathology.  Plan is to proceed with an MRI.  She is symptomatic enough I would like to inject her just to try to give her some element of relief as we are working this up  Cortisone Injection. See procedure note.  Cortisone Injection for DIAGNOSTIC and THERAPUTIC purposes.

## 2018-12-11 RX ORDER — METHYLPREDNISOLONE ACETATE 80 MG/ML
80 INJECTION, SUSPENSION INTRA-ARTICULAR; INTRALESIONAL; INTRAMUSCULAR; SOFT TISSUE
Status: COMPLETED | OUTPATIENT
Start: 2018-12-10 | End: 2018-12-10

## 2018-12-11 RX ORDER — LIDOCAINE HYDROCHLORIDE 10 MG/ML
4 INJECTION, SOLUTION EPIDURAL; INFILTRATION; INTRACAUDAL; PERINEURAL
Status: COMPLETED | OUTPATIENT
Start: 2018-12-10 | End: 2018-12-10

## 2018-12-26 ENCOUNTER — TELEPHONE (OUTPATIENT)
Dept: ORTHOPEDIC SURGERY | Facility: CLINIC | Age: 63
End: 2018-12-26

## 2019-03-25 ENCOUNTER — APPOINTMENT (OUTPATIENT)
Dept: WOMENS IMAGING | Facility: HOSPITAL | Age: 64
End: 2019-03-25

## 2019-03-25 PROCEDURE — 77067 SCR MAMMO BI INCL CAD: CPT | Performed by: RADIOLOGY

## 2019-03-25 PROCEDURE — MDREVIEWSP: Performed by: RADIOLOGY

## 2019-03-25 PROCEDURE — 77063 BREAST TOMOSYNTHESIS BI: CPT | Performed by: RADIOLOGY

## 2019-05-17 ENCOUNTER — AMBULATORY SURGICAL CENTER (OUTPATIENT)
Dept: URBAN - METROPOLITAN AREA SURGERY 17 | Facility: SURGERY | Age: 64
End: 2019-05-17
Payer: COMMERCIAL

## 2019-05-17 ENCOUNTER — OFFICE (OUTPATIENT)
Dept: URBAN - METROPOLITAN AREA PATHOLOGY 4 | Facility: PATHOLOGY | Age: 64
End: 2019-05-17
Payer: COMMERCIAL

## 2019-05-17 VITALS
HEART RATE: 96 BPM | OXYGEN SATURATION: 98 % | HEART RATE: 113 BPM | HEART RATE: 93 BPM | OXYGEN SATURATION: 100 % | OXYGEN SATURATION: 97 % | DIASTOLIC BLOOD PRESSURE: 76 MMHG | DIASTOLIC BLOOD PRESSURE: 66 MMHG | SYSTOLIC BLOOD PRESSURE: 105 MMHG | WEIGHT: 170 LBS | SYSTOLIC BLOOD PRESSURE: 99 MMHG | RESPIRATION RATE: 11 BRPM | RESPIRATION RATE: 20 BRPM | HEART RATE: 97 BPM | SYSTOLIC BLOOD PRESSURE: 138 MMHG | SYSTOLIC BLOOD PRESSURE: 101 MMHG | SYSTOLIC BLOOD PRESSURE: 150 MMHG | SYSTOLIC BLOOD PRESSURE: 107 MMHG | RESPIRATION RATE: 21 BRPM | HEIGHT: 63 IN | HEART RATE: 95 BPM | OXYGEN SATURATION: 99 % | HEART RATE: 106 BPM | RESPIRATION RATE: 16 BRPM | DIASTOLIC BLOOD PRESSURE: 78 MMHG | OXYGEN SATURATION: 96 % | HEART RATE: 92 BPM | OXYGEN SATURATION: 92 % | DIASTOLIC BLOOD PRESSURE: 63 MMHG | SYSTOLIC BLOOD PRESSURE: 162 MMHG | RESPIRATION RATE: 17 BRPM | TEMPERATURE: 97.2 F | HEART RATE: 89 BPM | DIASTOLIC BLOOD PRESSURE: 70 MMHG | SYSTOLIC BLOOD PRESSURE: 103 MMHG | TEMPERATURE: 98.1 F | RESPIRATION RATE: 10 BRPM | HEART RATE: 101 BPM | SYSTOLIC BLOOD PRESSURE: 125 MMHG | DIASTOLIC BLOOD PRESSURE: 97 MMHG | SYSTOLIC BLOOD PRESSURE: 109 MMHG | HEART RATE: 91 BPM | RESPIRATION RATE: 19 BRPM | DIASTOLIC BLOOD PRESSURE: 75 MMHG | DIASTOLIC BLOOD PRESSURE: 62 MMHG | DIASTOLIC BLOOD PRESSURE: 59 MMHG | DIASTOLIC BLOOD PRESSURE: 71 MMHG | DIASTOLIC BLOOD PRESSURE: 83 MMHG | RESPIRATION RATE: 18 BRPM

## 2019-05-17 DIAGNOSIS — Z86.010 PERSONAL HISTORY OF COLONIC POLYPS: ICD-10-CM

## 2019-05-17 DIAGNOSIS — K31.89 OTHER DISEASES OF STOMACH AND DUODENUM: ICD-10-CM

## 2019-05-17 DIAGNOSIS — K64.8 OTHER HEMORRHOIDS: ICD-10-CM

## 2019-05-17 DIAGNOSIS — K31.7 POLYP OF STOMACH AND DUODENUM: ICD-10-CM

## 2019-05-17 DIAGNOSIS — K29.70 GASTRITIS, UNSPECIFIED, WITHOUT BLEEDING: ICD-10-CM

## 2019-05-17 DIAGNOSIS — K21.9 GASTRO-ESOPHAGEAL REFLUX DISEASE WITHOUT ESOPHAGITIS: ICD-10-CM

## 2019-05-17 PROBLEM — D13.1 BENIGN NEOPLASM OF STOMACH: Status: ACTIVE | Noted: 2019-05-17

## 2019-05-17 LAB
GI HISTOLOGY: A. UNSPECIFIED: (no result)
GI HISTOLOGY: B. UNSPECIFIED: (no result)
GI HISTOLOGY: C. SELECT: (no result)
GI HISTOLOGY: PDF REPORT: (no result)

## 2019-05-17 PROCEDURE — 43239 EGD BIOPSY SINGLE/MULTIPLE: CPT

## 2019-05-17 PROCEDURE — 88305 TISSUE EXAM BY PATHOLOGIST: CPT

## 2019-05-17 PROCEDURE — 45378 DIAGNOSTIC COLONOSCOPY: CPT

## 2019-11-01 ENCOUNTER — OFFICE VISIT (OUTPATIENT)
Dept: CARDIOLOGY | Facility: CLINIC | Age: 64
End: 2019-11-01

## 2019-11-01 VITALS
HEART RATE: 78 BPM | DIASTOLIC BLOOD PRESSURE: 66 MMHG | BODY MASS INDEX: 30.23 KG/M2 | WEIGHT: 170.6 LBS | SYSTOLIC BLOOD PRESSURE: 110 MMHG | RESPIRATION RATE: 18 BRPM | HEIGHT: 63 IN

## 2019-11-01 DIAGNOSIS — R06.02 SHORTNESS OF BREATH: ICD-10-CM

## 2019-11-01 DIAGNOSIS — E66.9 OBESITY (BMI 30.0-34.9): ICD-10-CM

## 2019-11-01 DIAGNOSIS — I10 ESSENTIAL HYPERTENSION: Primary | ICD-10-CM

## 2019-11-01 DIAGNOSIS — Z82.49 FAMILY HISTORY OF EARLY CAD: ICD-10-CM

## 2019-11-01 DIAGNOSIS — E78.5 HYPERLIPIDEMIA LDL GOAL <100: ICD-10-CM

## 2019-11-01 DIAGNOSIS — R55 PRE-SYNCOPE: ICD-10-CM

## 2019-11-01 DIAGNOSIS — I95.1 ORTHOSTASIS: ICD-10-CM

## 2019-11-01 PROCEDURE — 99204 OFFICE O/P NEW MOD 45 MIN: CPT | Performed by: INTERNAL MEDICINE

## 2019-11-01 RX ORDER — PRAVASTATIN SODIUM 10 MG
10 TABLET ORAL DAILY
Qty: 30 TABLET | Refills: 5 | Status: SHIPPED | OUTPATIENT
Start: 2019-11-01 | End: 2020-02-07 | Stop reason: SDUPTHER

## 2019-11-01 RX ORDER — HYDROCHLOROTHIAZIDE 12.5 MG/1
12.5 CAPSULE, GELATIN COATED ORAL EVERY MORNING
Qty: 30 CAPSULE | Refills: 5 | Status: ON HOLD | OUTPATIENT
Start: 2019-11-01 | End: 2023-02-07 | Stop reason: SDUPTHER

## 2019-11-07 ENCOUNTER — TELEPHONE (OUTPATIENT)
Dept: CARDIAC REHAB | Facility: HOSPITAL | Age: 64
End: 2019-11-07

## 2019-11-07 NOTE — TELEPHONE ENCOUNTER
Order received on workque.  Called and left message re: Phase III Cardiac Rehab (which is self pay) because she would not qualify for Phase II at this time.  I briefly reviewed the benefits of cardiac rehab and hope to hear back from her to discuss what location would be most convenient for her r/t where she lives and works.  Thank you for the referral!

## 2019-11-13 ENCOUNTER — HOSPITAL ENCOUNTER (OUTPATIENT)
Dept: CARDIOLOGY | Facility: HOSPITAL | Age: 64
Discharge: HOME | End: 2019-11-13
Admitting: INTERNAL MEDICINE

## 2019-11-13 DIAGNOSIS — R06.02 SHORTNESS OF BREATH: ICD-10-CM

## 2019-11-13 DIAGNOSIS — Z82.49 FAMILY HISTORY OF EARLY CAD: ICD-10-CM

## 2019-11-13 DIAGNOSIS — R55 PRE-SYNCOPE: ICD-10-CM

## 2019-11-13 PROCEDURE — 93306 TTE W/DOPPLER COMPLETE: CPT

## 2019-11-13 PROCEDURE — 93306 TTE W/DOPPLER COMPLETE: CPT | Performed by: INTERNAL MEDICINE

## 2019-11-13 NOTE — PROGRESS NOTES
Subjective   Patient ID: Kathy Ramirez is a 64 y.o. female is here today for follow-up for neck and shoulder pain.  She was last seen in office 11/8/18.  Dr. Rahman suggested she see an orthopedic for shoulder pain. She is currently being treated by Ortho.     Today, she complains of back and bilateral leg pain L groin pain.  She denies any cause or injury. She has not had any treatments. Mrs. Ramirez takes Hydrocodone 5/325 PRN and Cyclobenzaprine 10 mg HS for pain with no relief.     Back Pain   The current episode started more than 1 month ago (5 months ). The problem occurs constantly. The pain is present in the lumbar spine. The quality of the pain is described as aching. The pain is at a severity of 8/10. The symptoms are aggravated by standing and lying down. Associated symptoms include leg pain, numbness and tingling. Pertinent negatives include no bladder incontinence or bowel incontinence. She has tried muscle relaxant and ice (Hydrocodone ) for the symptoms. The treatment provided mild relief.   Leg Pain    The pain is present in the left leg, right leg, right thigh and left thigh (L groing pain ). The quality of the pain is described as shooting. The pain is at a severity of 10/10. The pain is severe. The pain has been worsening since onset. Associated symptoms include numbness and tingling. She has tried ice for the symptoms. The treatment provided mild relief.       The following portions of the patient's history were reviewed and updated as appropriate: allergies, current medications, past family history, past medical history, past social history, past surgical history and problem list.    Review of Systems   Gastrointestinal: Negative for bowel incontinence.   Genitourinary: Negative for bladder incontinence.   Musculoskeletal: Positive for back pain (bilateral leg pain ).   Neurological: Positive for tingling and numbness.   All other systems reviewed and are negative.      Objective   Physical Exam    Constitutional: She is oriented to person, place, and time. She appears well-developed and well-nourished.   HENT:   Head: Normocephalic and atraumatic.   Right Ear: External ear normal.   Left Ear: External ear normal.   Eyes: Conjunctivae and EOM are normal. Pupils are equal, round, and reactive to light. Right eye exhibits no discharge. Left eye exhibits no discharge.   Neck: Normal range of motion. Neck supple. No tracheal deviation present.   Pulmonary/Chest: Effort normal. No stridor. No respiratory distress.   Musculoskeletal: Normal range of motion. She exhibits no edema, tenderness or deformity.   Neurological: She is alert and oriented to person, place, and time. She has normal strength and normal reflexes. She displays no atrophy, no tremor and normal reflexes. No cranial nerve deficit or sensory deficit. She exhibits normal muscle tone. She displays a negative Romberg sign. She displays no seizure activity. Coordination and gait normal.   No long tract signs   Skin: Skin is warm and dry.   Psychiatric: She has a normal mood and affect. Her behavior is normal. Judgment and thought content normal.   Nursing note and vitals reviewed.    Neurologic Exam     Mental Status   Oriented to person, place, and time.     Cranial Nerves     CN III, IV, VI   Pupils are equal, round, and reactive to light.  Extraocular motions are normal.     Motor Exam     Strength   Strength 5/5 throughout.       Assessment/Plan   Independent Review of Radiographic Studies:    I did review the myelogram from 2016 showed multilevel disc degeneration as well as scoliosis with the apex at L3-L4.  There was fairly severe spinal stenosis at L3-L4 with bilateral foraminal narrowing at L4-L5 as well.  Medical Decision Making:    Ms. Ramirez had a previous ACDF with Dr. Rahman in July 2018 at C5-C6.  She did well thereafter but continues to have bilateral shoulder pain.  She is followed by Dr. Siu with orthopedics for bilateral shoulder DJD.   She does have a history of rheumatoid arthritis as well as diabetes.  She has had back and bilateral leg pain for many years and had therapy and epidurals in the past which did not offer relief.  She saw Dr. Paul in 2016 and had a myelogram at that time that did show severe spinal stenosis particularly at L3-L4.  She now returns with increasing pain in both hips, the left groin and bilateral anteromedial thighs.  She also has bilateral foot numbness and tingling and pain particularly at night and states that she had an EMG in the last few years that did not show peripheral neuropathy but suggested that her symptoms were more radicular in nature.  She denies any focal weakness or incontinence.  The radicular pain is much more bothersome than the back pain and worsens with any prolonged standing or walking.    Her symptoms correlate with her imaging from 2016.  Given that most of her pain is radicular and has not improved in the past with epidurals or therapy I do think it is reasonable to consider surgery.  I recommended that we get a new MRI and x-rays have her follow-up thereafter with Dr. Rahman to discuss potential surgical intervention.  Kathy was seen today for neck pain and arm pain.    Diagnoses and all orders for this visit:    Spinal stenosis of lumbar region with neurogenic claudication  -     MRI Lumbar Spine Without Contrast; Future  -     XR Spine Lumbar Complete With Flex & Ext; Future    DDD (degenerative disc disease), lumbar  -     MRI Lumbar Spine Without Contrast; Future  -     XR Spine Lumbar Complete With Flex & Ext; Future      Return for follow up after radiology test with Dr. Rahman.

## 2019-11-14 LAB
AORTIC DIMENSIONLESS INDEX: 0.6 (DI)
BH CV ECHO MEAS - AO MAX PG: 10.6 MMHG
BH CV ECHO MEAS - AO MEAN PG (FULL): 3 MMHG
BH CV ECHO MEAS - AO MEAN PG: 6 MMHG
BH CV ECHO MEAS - AO V2 MAX: 162.8 CM/SEC
BH CV ECHO MEAS - AO V2 MEAN: 115 CM/SEC
BH CV ECHO MEAS - AO V2 VTI: 30.3 CM
BH CV ECHO MEAS - AVA(I,A): 2.1 CM^2
BH CV ECHO MEAS - AVA(I,D): 2.1 CM^2
BH CV ECHO MEAS - BSA(HAYCOCK): 1.9 M^2
BH CV ECHO MEAS - BSA: 1.8 M^2
BH CV ECHO MEAS - BZI_BMI: 30.1 KILOGRAMS/M^2
BH CV ECHO MEAS - BZI_METRIC_HEIGHT: 160 CM
BH CV ECHO MEAS - BZI_METRIC_WEIGHT: 77.1 KG
BH CV ECHO MEAS - EDV(CUBED): 32.8 ML
BH CV ECHO MEAS - EDV(MOD-SP2): 39 ML
BH CV ECHO MEAS - EDV(MOD-SP4): 55 ML
BH CV ECHO MEAS - EDV(TEICH): 41 ML
BH CV ECHO MEAS - EF(CUBED): 75.6 %
BH CV ECHO MEAS - EF(MOD-BP): 68 %
BH CV ECHO MEAS - EF(MOD-SP2): 64.1 %
BH CV ECHO MEAS - EF(MOD-SP4): 67.3 %
BH CV ECHO MEAS - EF(TEICH): 68.9 %
BH CV ECHO MEAS - ESV(CUBED): 8 ML
BH CV ECHO MEAS - ESV(MOD-SP2): 14 ML
BH CV ECHO MEAS - ESV(MOD-SP4): 18 ML
BH CV ECHO MEAS - ESV(TEICH): 12.7 ML
BH CV ECHO MEAS - FS: 37.5 %
BH CV ECHO MEAS - IVS/LVPW: 1.1
BH CV ECHO MEAS - IVSD: 0.9 CM
BH CV ECHO MEAS - LV DIASTOLIC VOL/BSA (35-75): 30.5 ML/M^2
BH CV ECHO MEAS - LV MASS(C)D: 71.2 GRAMS
BH CV ECHO MEAS - LV MASS(C)DI: 39.5 GRAMS/M^2
BH CV ECHO MEAS - LV MAX PG: 4.8 MMHG
BH CV ECHO MEAS - LV MEAN PG: 3 MMHG
BH CV ECHO MEAS - LV SYSTOLIC VOL/BSA (12-30): 10 ML/M^2
BH CV ECHO MEAS - LV V1 MAX: 109 CM/SEC
BH CV ECHO MEAS - LV V1 MEAN: 81.1 CM/SEC
BH CV ECHO MEAS - LV V1 VTI: 18.6 CM
BH CV ECHO MEAS - LVIDD: 3.2 CM
BH CV ECHO MEAS - LVIDS: 2 CM
BH CV ECHO MEAS - LVLD AP2: 6 CM
BH CV ECHO MEAS - LVLD AP4: 7 CM
BH CV ECHO MEAS - LVLS AP2: 5 CM
BH CV ECHO MEAS - LVLS AP4: 5.7 CM
BH CV ECHO MEAS - LVOT AREA (M): 3.5 CM^2
BH CV ECHO MEAS - LVOT AREA: 3.5 CM^2
BH CV ECHO MEAS - LVOT DIAM: 2.1 CM
BH CV ECHO MEAS - LVPWD: 0.8 CM
BH CV ECHO MEAS - MV A DUR: 0.11 SEC
BH CV ECHO MEAS - MV A MAX VEL: 112 CM/SEC
BH CV ECHO MEAS - MV DEC SLOPE: 953 CM/SEC^2
BH CV ECHO MEAS - MV DEC TIME: 194 SEC
BH CV ECHO MEAS - MV E MAX VEL: 92.1 CM/SEC
BH CV ECHO MEAS - MV E/A: 0.82
BH CV ECHO MEAS - MV MEAN PG: 2 MMHG
BH CV ECHO MEAS - MV P1/2T MAX VEL: 101 CM/SEC
BH CV ECHO MEAS - MV P1/2T: 31 MSEC
BH CV ECHO MEAS - MV V2 MEAN: 59.1 CM/SEC
BH CV ECHO MEAS - MV V2 VTI: 19.8 CM
BH CV ECHO MEAS - MVA P1/2T LCG: 2.2 CM^2
BH CV ECHO MEAS - MVA(P1/2T): 7.1 CM^2
BH CV ECHO MEAS - MVA(VTI): 3.3 CM^2
BH CV ECHO MEAS - PA ACC SLOPE: 1186 CM/SEC^2
BH CV ECHO MEAS - PA ACC TIME: 0.09 SEC
BH CV ECHO MEAS - PA MAX PG (FULL): 2.7 MMHG
BH CV ECHO MEAS - PA MAX PG: 5.1 MMHG
BH CV ECHO MEAS - PA PR(ACCEL): 37.6 MMHG
BH CV ECHO MEAS - PA V2 MAX: 113 CM/SEC
BH CV ECHO MEAS - RV MAX PG: 2.4 MMHG
BH CV ECHO MEAS - RV MEAN PG: 1 MMHG
BH CV ECHO MEAS - RV V1 MAX: 78.2 CM/SEC
BH CV ECHO MEAS - RV V1 MEAN: 57.1 CM/SEC
BH CV ECHO MEAS - RV V1 VTI: 15.2 CM
BH CV ECHO MEAS - SI(CUBED): 13.7 ML/M^2
BH CV ECHO MEAS - SI(LVOT): 35.7 ML/M^2
BH CV ECHO MEAS - SI(MOD-SP2): 13.9 ML/M^2
BH CV ECHO MEAS - SI(MOD-SP4): 20.5 ML/M^2
BH CV ECHO MEAS - SI(TEICH): 15.6 ML/M^2
BH CV ECHO MEAS - SV(CUBED): 24.8 ML
BH CV ECHO MEAS - SV(LVOT): 64.4 ML
BH CV ECHO MEAS - SV(MOD-SP2): 25 ML
BH CV ECHO MEAS - SV(MOD-SP4): 37 ML
BH CV ECHO MEAS - SV(TEICH): 28.2 ML
BH CV ECHO MEAS - TAPSE (>1.6): 2.3 CM2
BH CV XLRA - RV BASE: 2.3 CM
LEFT ATRIUM VOLUME INDEX: 19 ML/M2

## 2019-11-15 ENCOUNTER — OFFICE VISIT (OUTPATIENT)
Dept: NEUROSURGERY | Facility: CLINIC | Age: 64
End: 2019-11-15

## 2019-11-15 VITALS
SYSTOLIC BLOOD PRESSURE: 137 MMHG | DIASTOLIC BLOOD PRESSURE: 89 MMHG | HEIGHT: 63 IN | HEART RATE: 97 BPM | WEIGHT: 168.2 LBS | BODY MASS INDEX: 29.8 KG/M2

## 2019-11-15 DIAGNOSIS — M48.062 SPINAL STENOSIS OF LUMBAR REGION WITH NEUROGENIC CLAUDICATION: Primary | ICD-10-CM

## 2019-11-15 DIAGNOSIS — M51.36 DDD (DEGENERATIVE DISC DISEASE), LUMBAR: ICD-10-CM

## 2019-11-15 PROBLEM — M54.16 LUMBAR RADICULOPATHY: Status: RESOLVED | Noted: 2017-05-09 | Resolved: 2019-11-15

## 2019-11-15 PROBLEM — M51.369 DDD (DEGENERATIVE DISC DISEASE), LUMBAR: Status: ACTIVE | Noted: 2019-11-15

## 2019-11-15 PROCEDURE — 99214 OFFICE O/P EST MOD 30 MIN: CPT | Performed by: PHYSICIAN ASSISTANT

## 2019-12-06 ENCOUNTER — HOSPITAL ENCOUNTER (OUTPATIENT)
Dept: MRI IMAGING | Facility: HOSPITAL | Age: 64
Discharge: HOME | End: 2019-12-06
Admitting: PHYSICIAN ASSISTANT

## 2019-12-06 ENCOUNTER — HOSPITAL ENCOUNTER (OUTPATIENT)
Dept: GENERAL RADIOLOGY | Facility: HOSPITAL | Age: 64
Discharge: HOME | End: 2019-12-06

## 2019-12-06 DIAGNOSIS — M51.36 DDD (DEGENERATIVE DISC DISEASE), LUMBAR: ICD-10-CM

## 2019-12-06 DIAGNOSIS — M48.062 SPINAL STENOSIS OF LUMBAR REGION WITH NEUROGENIC CLAUDICATION: ICD-10-CM

## 2019-12-06 PROCEDURE — 72114 X-RAY EXAM L-S SPINE BENDING: CPT

## 2019-12-06 PROCEDURE — 72148 MRI LUMBAR SPINE W/O DYE: CPT

## 2020-01-03 NOTE — PROGRESS NOTES
Subjective   Patient ID: Kathy Ramirez is a 64 y.o. female is here today for follow-up with a new Lumbar MRI and XR that was ordered at her last office visit with Edel on 11/15/2019 for back and leg pain with tingling and numbness.  Today her symptoms are unchanged from her previous visit with Edel.      History of Present Illness    This patient with radiation.  The pain is sharp and stabbing and quite severe.  It is located in both legs.  It radiates into her forearm as well and no difficulty with bowel bladder control or other associated symptoms.    The following portions of the patient's history were reviewed and updated as appropriate: allergies, current medications, past family history, past medical history, past social history, past surgical history and problem list.    Review of Systems   Respiratory: Negative for chest tightness and shortness of breath.    Cardiovascular: Negative for chest pain.   Musculoskeletal: Positive for back pain.        Leg pain   Neurological: Positive for numbness.        Positive for tingling   All other systems reviewed and are negative.      Objective   Physical Exam   Constitutional: She is oriented to person, place, and time. She appears well-developed and well-nourished.   Eyes: Pupils are equal, round, and reactive to light. EOM are normal.   Neurological: She is oriented to person, place, and time. She has a normal Finger-Nose-Finger Test and a normal Heel to Shin Test. Gait normal.   Reflex Scores:       Tricep reflexes are 2+ on the right side and 2+ on the left side.       Bicep reflexes are 2+ on the right side and 2+ on the left side.       Brachioradialis reflexes are 2+ on the right side and 2+ on the left side.       Patellar reflexes are 2+ on the right side and 2+ on the left side.       Achilles reflexes are 2+ on the right side and 2+ on the left side.  Psychiatric: Her speech is normal.     Neurologic Exam     Mental Status   Oriented to person, place, and  time.   Registration of memory: Good recent and remote memory.   Attention: normal. Concentration: normal.   Speech: speech is normal   Level of consciousness: alert  Knowledge: consistent with education.     Cranial Nerves     CN II   Visual fields full to confrontation.   Visual acuity: normal    CN III, IV, VI   Pupils are equal, round, and reactive to light.  Extraocular motions are normal.     CN V   Facial sensation intact.   Right corneal reflex: normal  Left corneal reflex: normal    CN VII   Facial expression full, symmetric.   Right facial weakness: none  Left facial weakness: none    CN VIII   Hearing: intact    CN IX, X   Palate: symmetric    CN XI   Right sternocleidomastoid strength: normal  Left sternocleidomastoid strength: normal    CN XII   Tongue: not atrophic  Tongue deviation: none    Motor Exam   Muscle bulk: normal  Right arm tone: normal  Left arm tone: normal  Right leg tone: normal  Left leg tone: normal    Strength   Strength 5/5 except as noted.     Sensory Exam   Light touch normal.     Gait, Coordination, and Reflexes     Gait  Gait: normal    Coordination   Finger to nose coordination: normal  Heel to shin coordination: normal    Reflexes   Right brachioradialis: 2+  Left brachioradialis: 2+  Right biceps: 2+  Left biceps: 2+  Right triceps: 2+  Left triceps: 2+  Right patellar: 2+  Left patellar: 2+  Right achilles: 2+  Left achilles: 2+  Right : 2+  Left : 2+      Assessment/Plan   Independent Review of Radiographic Studies:      I reviewed her plain films and her MRI myself.  The plain films show multilevel degenerative disease.  There is no evidence of abnormal movement on flexion and extension.  On the MRI there is some posterior disc bulging at what I would call L3-4.  There is some abnormal signal above that.  On the axial images there is some right-sided disc bulging at L1-2.  And some left-sided disc bulging at L2-3.  At L3-4 there is a large abnormality on the left  that appears to be a synovial cyst probably filling the neuroforamen.  At L4-5 there is a left sided chronic disc herniation that is calcified and relatively severe stenosis.  L5-S1 shows some posterior disc bulging    Medical Decision Making:      I told the patient and her  about the imaging.  I told him that from my point of view I would tend to proceed with a lumbar myelogram.  She had a myelogram about 3 years ago but with the disc bulging at L1-2 and L2-3 I think we need to get better pictures.  They agree.  I told the patient what a myelogram involves.  I explained that there is a 50% chance of developing a bad headache and nausea as a result of the test.  I explained that there is also a very small chance of infection, seizures, and bleeding.  I explained how we would treat a post myelogram headache including bedrest, caffeinated fluids, steroids, and blood patch.  The patient does ask to proceed.    Kathy was seen today for back pain and leg pain.    Diagnoses and all orders for this visit:    DDD (degenerative disc disease), lumbar    Spinal stenosis of lumbar region with neurogenic claudication  -     IR Myelogram Lumbar Spine; Future  -     CT Lumbar Spine With Intrathecal Contrast; Future  -     XR Spine Lumbar Complete With Flex & Ext; Future  -     dexamethasone (DECADRON) 4 MG tablet; Take 2 tablets by mouth Take As Directed. Take both tablets by mouth 2 hours before myelogram      Return for After radiology test.

## 2020-01-06 ENCOUNTER — OFFICE VISIT (OUTPATIENT)
Dept: NEUROSURGERY | Facility: CLINIC | Age: 65
End: 2020-01-06

## 2020-01-06 VITALS — SYSTOLIC BLOOD PRESSURE: 148 MMHG | HEART RATE: 78 BPM | DIASTOLIC BLOOD PRESSURE: 81 MMHG

## 2020-01-06 DIAGNOSIS — M51.36 DDD (DEGENERATIVE DISC DISEASE), LUMBAR: Primary | ICD-10-CM

## 2020-01-06 DIAGNOSIS — M48.062 SPINAL STENOSIS OF LUMBAR REGION WITH NEUROGENIC CLAUDICATION: ICD-10-CM

## 2020-01-06 PROCEDURE — 99213 OFFICE O/P EST LOW 20 MIN: CPT | Performed by: NEUROLOGICAL SURGERY

## 2020-01-06 RX ORDER — DEXAMETHASONE 4 MG/1
8 TABLET ORAL TAKE AS DIRECTED
Qty: 2 TABLET | Refills: 0 | Status: SHIPPED | OUTPATIENT
Start: 2020-01-06 | End: 2020-03-03

## 2020-01-29 PROBLEM — Z92.89 HISTORY OF ECHOCARDIOGRAM: Status: ACTIVE | Noted: 2019-11-13

## 2020-02-07 ENCOUNTER — OFFICE VISIT (OUTPATIENT)
Dept: CARDIOLOGY | Facility: CLINIC | Age: 65
End: 2020-02-07

## 2020-02-07 VITALS
RESPIRATION RATE: 18 BRPM | BODY MASS INDEX: 30.9 KG/M2 | HEART RATE: 73 BPM | SYSTOLIC BLOOD PRESSURE: 130 MMHG | WEIGHT: 174.4 LBS | DIASTOLIC BLOOD PRESSURE: 78 MMHG | HEIGHT: 63 IN

## 2020-02-07 DIAGNOSIS — I10 ESSENTIAL HYPERTENSION: ICD-10-CM

## 2020-02-07 DIAGNOSIS — Z82.49 FAMILY HISTORY OF EARLY CAD: Primary | ICD-10-CM

## 2020-02-07 DIAGNOSIS — E78.5 HYPERLIPIDEMIA LDL GOAL <100: ICD-10-CM

## 2020-02-07 DIAGNOSIS — Z92.89 HISTORY OF ECHOCARDIOGRAM: ICD-10-CM

## 2020-02-07 PROCEDURE — 99213 OFFICE O/P EST LOW 20 MIN: CPT | Performed by: INTERNAL MEDICINE

## 2020-02-07 RX ORDER — LISINOPRIL 5 MG/1
5 TABLET ORAL EVERY MORNING
Qty: 90 TABLET | Refills: 3 | Status: SHIPPED | OUTPATIENT
Start: 2020-02-07

## 2020-02-07 RX ORDER — PRAVASTATIN SODIUM 10 MG
10 TABLET ORAL DAILY
Qty: 90 TABLET | Refills: 3 | Status: SHIPPED | OUTPATIENT
Start: 2020-02-07 | End: 2020-02-10 | Stop reason: SDUPTHER

## 2020-02-07 RX ORDER — METOPROLOL SUCCINATE 50 MG
75 TABLET, EXTENDED RELEASE 24 HR ORAL NIGHTLY
Qty: 135 TABLET | Refills: 3 | Status: SHIPPED | OUTPATIENT
Start: 2020-02-07

## 2020-02-07 NOTE — PROGRESS NOTES
Cardiology clinic note  Russel Spear MD, PhD  [facility] cardiology  [facility]  Subjective:     Encounter Date:02/07/2020      Patient ID: Kathy Ramirez is a 65 y.o. female.    Chief Complaint:  Chief Complaint   Patient presents with   • Follow-up       HPI:  History of Present Illness  Per prior encounter  I had the pleasure seeing this very pleasant 64-year-old female who is here from primary care Lucy Turcios MD.  She is referred with family history of coronary artery disease as well as comorbidities of hypertension hyperlipidemia and diabetes for establishment of care.  She does complain of intermittent presyncope when she stands up most consistent with orthostatic hypotension.  Blood pressure today is 110/66 with a heart rate of 70s regular.  She also has a history of hyperlipidemia and is intermittently compliant with atorvastatin given severe myalgias and muscle aches.  She has not tried many other statins.  We did discuss alternatives on initial encounter and she is open to changes.  She is largely sedentary lifestyle and at 170 pounds above her goal body weight.  We discussed diet and exercise extensively today.  She has significant osteoarthritis and is pending shoulder repair or replacement.  This, limits her exercise abilities.  Related to her functional class, She denies overt chest pain but does have dyspnea on exertion NYHA class II symptoms.  No history of overt CAD or PAD.  No history of stroke.  Essentially no other complaints.  She needs refills of her hydrochlorothiazide today.      Today she presents back to clinic with very well-controlled blood pressure 130/78 heart rate 73.  She has minimal dyspnea on exertion above baseline NYHA class II symptoms and is mostly limited by orthopedic complaints with osteoarthritis in her hips as well as difficulty moving the right shoulder which is pending repair at this time.  She has no contraindication from a  cardiovascular standpoint for perioperative concerns.  She has normal LV systolic function, she is on beta-blocker at baseline and her blood pressure is well controlled and she is on low-dose statin therapy.  She has no other complaints today.  No heart failure signs or symptoms no PND orthopnea palpitations chest pain or new or concerning symptoms.  She is very stable on my encounter today and can be seen back in 6 months to 1 year or as needed in between.  She has good primary care follow-up and can see them in the interim.  She will be given medicine refills today for 1 year with respect to metoprolol lisinopril and pravastatin.    Historical data copied forward from previous encounters is unchanged        Review of systems x14 point review of systems is negative except was mentioned above.         The following portions of the patient's history were reviewed and updated as appropriate: allergies, current medications, past family history, past medical history, past social history, past surgical history and problem list.    Problem List:  Patient Active Problem List   Diagnosis   • Lumbar canal stenosis   • Diabetes mellitus (CMS/HCC)   • Hypertension   • Rheumatoid arthritis (CMS/HCC)   • Cervical radiculitis   • Acute pain of left shoulder   • Family history of early CAD   • Hyperlipidemia LDL goal <100   • Orthostasis   • DDD (degenerative disc disease), lumbar   • History of echocardiogram       Past Medical History:  Past Medical History:   Diagnosis Date   • DDD (degenerative disc disease), lumbar    • Diabetes mellitus (CMS/HCC)     TYPE 2   • Dysrhythmia    • GERD (gastroesophageal reflux disease)    • Herpes     GETS FEVER BLISTERS AT TIMES   • History of echocardiogram 11/13/2019    EF 68% Grade 1 diastolic dysfucntion. Mild AR. Trace TR.    • History of hepatitis A     1970   • History of transfusion    • Hyperlipidemia    • Hypertension    • IBS (irritable bowel syndrome)    • Lumbar canal stenosis   "  • Lumbar radiculopathy    • PONV (postoperative nausea and vomiting)    • Psoriasis    • Rheumatoid arthritis (CMS/HCC)    • RLS (restless legs syndrome)    • Sinus tachycardia        Past Surgical History:  Past Surgical History:   Procedure Laterality Date   • ANTERIOR CERVICAL DISCECTOMY W/ FUSION N/A 2018    Procedure: C5 6 anterior cervical discectomy, fusion and instrumentation;  Surgeon: Robbie Rahman MD;  Location: [facility] OR;  Service: Neurosurgery   • APPENDECTOMY     •  SECTION      5 csection   • CHOLECYSTECTOMY     • COLONOSCOPY     • HYSTERECTOMY     • KNEE ARTHROPLASTY Left    • KNEE ARTHROPLASTY Right    • WISDOM TOOTH EXTRACTION         Social History:  Social History     Socioeconomic History   • Marital status:      Spouse name: Not on file   • Number of children: 5   • Years of education: MA   • Highest education level: Not on file   Occupational History   • Occupation: Cancer    Tobacco Use   • Smoking status: Never Smoker   • Smokeless tobacco: Never Used   Substance and Sexual Activity   • Alcohol use: Yes     Comment: rare   • Drug use: No   • Sexual activity: Defer       Allergies:  No Known Allergies    Immunizations:    There is no immunization history on file for this patient.    ROS:  ROS       Objective:         /78 (BP Location: Left arm, Patient Position: Sitting)   Pulse 73   Resp 18   Ht 160 cm (63\")   Wt 79.1 kg (174 lb 6.4 oz)   BMI 30.89 kg/m²     Physical Exam  No acute distress alert and oriented x3 afebrile vital signs stable  Trachea midline neck supple no carotid bruits no JVD  Regular rate and rhythm no rubs murmurs or gallops  Clear to auscultation bilaterally  Abdomen obese soft nontender nondistended bowel sounds positive  No clubbing cyanosis or edema noted  Cap refill less than 2 seconds  Skin warm and dry  No bony ab normalities grossly  Neurologic grossly intact bilaterally  Normal " mood and affect    Compared to prior encounter her physical exam is unchanged.  In-Office Procedure(s):  Procedures    ASCVD RIsk Score::  The ASCVD Risk score (Yen GALE Jr., et al., 2013) failed to calculate for the following reasons:    Cannot find a previous HDL lab    Cannot find a previous total cholesterol lab    Recent Radiology:  Imaging Results (Most Recent)     None          Lab Review:   Hospital Outpatient Visit on 11/13/2019   Component Date Value   • BSA 11/13/2019 1.8    • IVSd 11/13/2019 0.9    • LVIDd 11/13/2019 3.2    • LVIDs 11/13/2019 2.0    • LVPWd 11/13/2019 0.8    • IVS/LVPW 11/13/2019 1.1    • FS 11/13/2019 37.5    • EDV(Teich) 11/13/2019 41.0    • ESV(Teich) 11/13/2019 12.7    • EF(Teich) 11/13/2019 68.9    • EDV(cubed) 11/13/2019 32.8    • ESV(cubed) 11/13/2019 8.0    • EF(cubed) 11/13/2019 75.6    • LV mass(C)d 11/13/2019 71.2    • LV mass(C)dI 11/13/2019 39.5    • SV(Teich) 11/13/2019 28.2    • SI(Teich) 11/13/2019 15.6    • SV(cubed) 11/13/2019 24.8    • SI(cubed) 11/13/2019 13.7    • LVOT diam 11/13/2019 2.1    • LVOT area 11/13/2019 3.5    • LVOT area(traced) 11/13/2019 3.5    • LVLd ap4 11/13/2019 7.0    • EDV(MOD-sp4) 11/13/2019 55.0    • LVLs ap4 11/13/2019 5.7    • ESV(MOD-sp4) 11/13/2019 18.0    • EF(MOD-sp4) 11/13/2019 67.3    • LVLd ap2 11/13/2019 6.0    • EDV(MOD-sp2) 11/13/2019 39.0    • LVLs ap2 11/13/2019 5.0    • ESV(MOD-sp2) 11/13/2019 14.0    • EF(MOD-sp2) 11/13/2019 64.1    • SV(MOD-sp4) 11/13/2019 37.0    • SI(MOD-sp4) 11/13/2019 20.5    • SV(MOD-sp2) 11/13/2019 25.0    • SI(MOD-sp2) 11/13/2019 13.9    • LV Bales Vol (BSA correct* 11/13/2019 30.5    • LV Sys Vol (BSA correcte* 11/13/2019 10.0    • Ao V2 mean 11/13/2019 115.0    • Ao mean PG 11/13/2019 6.0    • Ao mean PG (full) 11/13/2019 3.0    • Ao V2 VTI 11/13/2019 30.3    • MARIA LUZ(I,A) 11/13/2019 2.1    • MARIA LUZ(I,D) 11/13/2019 2.1    • LV V1 mean PG 11/13/2019 3.0    • LV V1 mean 11/13/2019 81.1    • LV V1 VTI 11/13/2019  18.6    • SV(LVOT) 11/13/2019 64.4    • SI(LVOT) 11/13/2019 35.7    • PA V2 max 11/13/2019 113.0    • PA max PG 11/13/2019 5.1    • PA max PG (full) 11/13/2019 2.7    • PA acc slope 11/13/2019 1,186    • PA acc time 11/13/2019 0.09    • RV V1 max PG 11/13/2019 2.4    • RV V1 mean PG 11/13/2019 1.0    • RV V1 max 11/13/2019 78.2    • RV V1 mean 11/13/2019 57.1    • RV V1 VTI 11/13/2019 15.2    • PA pr(Accel) 11/13/2019 37.6    •  CV ECHO VINNY - BZI_BMI 11/13/2019 30.1    •  CV ECHO VINNY - BSA(HA* 11/13/2019 1.9    •  CV ECHO VINNY - BZI_ME* 11/13/2019 77.1    •  CV ECHO VINNY - BZI_ME* 11/13/2019 160.0    • MV A dur 11/13/2019 0.11    • MV E max north 11/13/2019 92.1    • MV A max north 11/13/2019 112.0    • MV E/A 11/13/2019 0.82    • MV V2 mean 11/13/2019 59.1    • MV mean PG 11/13/2019 2.0    • MV V2 VTI 11/13/2019 19.8    • MVA(VTI) 11/13/2019 3.3    • MV P1/2t max north 11/13/2019 101.0    • MV P1/2t 11/13/2019 31.0    • MVA(P1/2t) 11/13/2019 7.1    • MV dec slope 11/13/2019 953.0    • MV dec time 11/13/2019 194    • MVA P1/2T LCG 11/13/2019 2.2    • RV Base 11/13/2019 2.30    • Dimensionless Index 11/13/2019 0.6    • LA Volume Index 11/13/2019 19.0    • Ao pk north 11/13/2019 162.8    • EF(MOD-bp) 11/13/2019 68.0    • LV V1 max PG 11/13/2019 4.8    • LV V1 max 11/13/2019 109.0    • Ao max PG 11/13/2019 10.6    • TAPSE (>1.6) 11/13/2019 2.30                 Assessment:          Diagnosis Plan   1. Hyperlipidemia LDL goal <100  pravastatin (PRAVACHOL) 10 MG tablet          Plan:      #1 primary prevention goals for CAD and PAD, completely asymptomatic presently, no chest pain or claudication     Hyperlipidemia, continue pravastatin 10 mg p.o. daily, fasting lipid panel in 3 months with CMP, primary care can modify accordingly with respect to statin therapy     Hypertension, refill HCTZ 12.5 p.o. daily, hypertension at goal 130/78     Sedentary lifestyle refer to cardiac rehab if qualified,  EKG previous clinic  normal sinus rhythm     2D echo for dyspnea on exertion demonstrates normal LV systolic function EF 68% with grade 1 diastolic dysfunction, no significant valvulopathy, normal right and left-sided pressures estimated, normal atrial sizes no significant abnormality seen.     Return to clinic in 12 months or sooner if needed, follow-up primary care in interim    Thank you very much for the referral.  It is a pleasure to be involved in her cardiac cardiovascular care.  Please call with any questions or concerns      No concern over perioperative concerns, continue statin and beta-blocker perioperatively      Russel Spear MD PhD      25 minutes spent with the patient going over echo results and all images, re-stratification, questions answered to the patient, review of medicines along with discussion of side effects related to osteoarthritis and muscle and joint pains as well as perioperative concerns by the patient.                 Russel Spear MD  02/07/20  .

## 2020-02-10 DIAGNOSIS — E78.5 HYPERLIPIDEMIA LDL GOAL <100: ICD-10-CM

## 2020-02-10 RX ORDER — PRAVASTATIN SODIUM 10 MG
10 TABLET ORAL DAILY
Qty: 90 TABLET | Refills: 1 | Status: SHIPPED | OUTPATIENT
Start: 2020-02-10

## 2020-03-03 ENCOUNTER — HOSPITAL ENCOUNTER (OUTPATIENT)
Dept: GENERAL RADIOLOGY | Facility: HOSPITAL | Age: 65
Discharge: HOME | End: 2020-03-03
Admitting: ORTHOPAEDIC SURGERY

## 2020-03-03 ENCOUNTER — HOSPITAL ENCOUNTER (OUTPATIENT)
Dept: GENERAL RADIOLOGY | Facility: HOSPITAL | Age: 65
Discharge: HOME | End: 2020-03-03

## 2020-03-03 ENCOUNTER — APPOINTMENT (OUTPATIENT)
Dept: PREADMISSION TESTING | Facility: HOSPITAL | Age: 65
End: 2020-03-03

## 2020-03-03 VITALS
SYSTOLIC BLOOD PRESSURE: 132 MMHG | TEMPERATURE: 97 F | OXYGEN SATURATION: 100 % | BODY MASS INDEX: 29.86 KG/M2 | DIASTOLIC BLOOD PRESSURE: 82 MMHG | HEIGHT: 63 IN | WEIGHT: 168.5 LBS | HEART RATE: 89 BPM | RESPIRATION RATE: 16 BRPM

## 2020-03-03 LAB
ALBUMIN SERPL-MCNC: 4.3 G/DL (ref 3.5–5.2)
ALBUMIN/GLOB SERPL: 1.5 G/DL
ALP SERPL-CCNC: 60 U/L (ref 39–117)
ALT SERPL W P-5'-P-CCNC: 32 U/L (ref 1–33)
ANION GAP SERPL CALCULATED.3IONS-SCNC: 12.5 MMOL/L (ref 5–15)
AST SERPL-CCNC: 27 U/L (ref 1–32)
BACTERIA UR QL AUTO: NORMAL /HPF
BILIRUB SERPL-MCNC: 0.8 MG/DL (ref 0.2–1.2)
BILIRUB UR QL STRIP: NEGATIVE
BUN BLD-MCNC: 9 MG/DL (ref 8–23)
BUN/CREAT SERPL: 8 (ref 7–25)
CALCIUM SPEC-SCNC: 9 MG/DL (ref 8.6–10.5)
CHLORIDE SERPL-SCNC: 99 MMOL/L (ref 98–107)
CLARITY UR: CLEAR
CO2 SERPL-SCNC: 23.5 MMOL/L (ref 22–29)
COLOR UR: YELLOW
CREAT BLD-MCNC: 1.12 MG/DL (ref 0.57–1)
DEPRECATED RDW RBC AUTO: 41.6 FL (ref 37–54)
ERYTHROCYTE [DISTWIDTH] IN BLOOD BY AUTOMATED COUNT: 12 % (ref 12.3–15.4)
GFR SERPL CREATININE-BSD FRML MDRD: 49 ML/MIN/1.73
GLOBULIN UR ELPH-MCNC: 2.9 GM/DL
GLUCOSE BLD-MCNC: 198 MG/DL (ref 65–99)
GLUCOSE UR STRIP-MCNC: NEGATIVE MG/DL
HCT VFR BLD AUTO: 40.1 % (ref 34–46.6)
HGB BLD-MCNC: 13.3 G/DL (ref 12–15.9)
HGB UR QL STRIP.AUTO: NEGATIVE
HYALINE CASTS UR QL AUTO: NORMAL /LPF
KETONES UR QL STRIP: NEGATIVE
LEUKOCYTE ESTERASE UR QL STRIP.AUTO: NEGATIVE
MCH RBC QN AUTO: 31.4 PG (ref 26.6–33)
MCHC RBC AUTO-ENTMCNC: 33.2 G/DL (ref 31.5–35.7)
MCV RBC AUTO: 94.6 FL (ref 79–97)
NITRITE UR QL STRIP: NEGATIVE
PH UR STRIP.AUTO: 5.5 [PH] (ref 5–8)
PLATELET # BLD AUTO: 213 10*3/MM3 (ref 140–450)
PMV BLD AUTO: 11.4 FL (ref 6–12)
POTASSIUM BLD-SCNC: 4.2 MMOL/L (ref 3.5–5.2)
PROT SERPL-MCNC: 7.2 G/DL (ref 6–8.5)
PROT UR QL STRIP: NEGATIVE
RBC # BLD AUTO: 4.24 10*6/MM3 (ref 3.77–5.28)
RBC # UR: NORMAL /HPF
REF LAB TEST METHOD: NORMAL
SODIUM BLD-SCNC: 135 MMOL/L (ref 136–145)
SP GR UR STRIP: 1.01 (ref 1–1.03)
SQUAMOUS #/AREA URNS HPF: NORMAL /HPF
UROBILINOGEN UR QL STRIP: NORMAL
WBC NRBC COR # BLD: 4.31 10*3/MM3 (ref 3.4–10.8)
WBC UR QL AUTO: NORMAL /HPF

## 2020-03-03 PROCEDURE — 81001 URINALYSIS AUTO W/SCOPE: CPT | Performed by: ORTHOPAEDIC SURGERY

## 2020-03-03 PROCEDURE — 85027 COMPLETE CBC AUTOMATED: CPT | Performed by: ORTHOPAEDIC SURGERY

## 2020-03-03 PROCEDURE — 80053 COMPREHEN METABOLIC PANEL: CPT | Performed by: ORTHOPAEDIC SURGERY

## 2020-03-03 PROCEDURE — 71046 X-RAY EXAM CHEST 2 VIEWS: CPT

## 2020-03-03 PROCEDURE — 36415 COLL VENOUS BLD VENIPUNCTURE: CPT

## 2020-03-03 PROCEDURE — 93010 ELECTROCARDIOGRAM REPORT: CPT | Performed by: INTERNAL MEDICINE

## 2020-03-03 PROCEDURE — 93005 ELECTROCARDIOGRAM TRACING: CPT

## 2020-03-03 PROCEDURE — 73030 X-RAY EXAM OF SHOULDER: CPT

## 2020-03-03 NOTE — DISCHARGE INSTRUCTIONS
Take the following medications the morning of surgery: PAIN MEDS AS NEEDED    ARRIVAL TIME WILL BE CALLED TO YOU DAY PRIOR TO SURGERY        General Instructions:  • Do not eat solid food after midnight the night before surgery.  • You may drink clear liquids day of surgery but must stop at least one hour before your hospital arrival time.  • It is beneficial for you to have a clear drink that contains carbohydrates the day of surgery.  We suggest a 12 to 20 ounce bottle of Gatorade or Powerade for non-diabetic patients or a 12 to 20 ounce bottle of G2 or Powerade Zero for diabetic patients. (Pediatric patients, are not advised to drink a 12 to 20 ounce carbohydrate drink)    Clear liquids are liquids you can see through.  Nothing red in color.     Plain water                               Sports drinks  Sodas                                   Gelatin (Jell-O)  Fruit juices without pulp such as white grape juice and apple juice  Popsicles that contain no fruit or yogurt  Tea or coffee (no cream or milk added)  Gatorade / Powerade  G2 / Powerade Zero          • Patients who avoid smoking, chewing tobacco and alcohol for 4 weeks prior to surgery have a reduced risk of post-operative complications.  Quit smoking as many days before surgery as you can.  • Do not smoke, use chewing tobacco or drink alcohol the day of surgery.   • If applicable bring your C-PAP/ BI-PAP machine.  • Bring any papers given to you in the doctor’s office.  • Wear clean comfortable clothes.  • Do not wear contact lenses, false eyelashes or make-up.  Bring a case for your glasses.   • Bring crutches or walker if applicable.  • Remove all piercings.  Leave jewelry and any other valuables at home.  • Hair extensions with metal clips must be removed prior to surgery.  • The Pre-Admission Testing nurse will instruct you to bring medications if unable to obtain an accurate list in Pre-Admission Testing.              Preventing a Surgical Site  Infection:  • For 2 to 3 days before surgery, avoid shaving with a razor because the razor can irritate skin and make it easier to develop an infection.    • Any areas of open skin can increase the risk of a post-operative wound infection by allowing bacteria to enter and travel throughout the body.  Notify your surgeon if you have any skin wounds / rashes even if it is not near the expected surgical site.  The area will need assessed to determine if surgery should be delayed until it is healed.  • The night prior to surgery shower using a fresh bar of anti-bacterial soap (such as Dial) and clean washcloth.  Sleep in a clean bed with clean clothing.  Do not allow pets to sleep with you.  • Shower on the morning of surgery using a fresh bar of anti-bacterial soap (such as Dial) and clean washcloth.  Dry with a clean towel and dress in clean clothing.  • Ask your surgeon if you will be receiving antibiotics prior to surgery.  • Make sure you, your family, and all healthcare providers clean their hands with soap and water or an alcohol based hand  before caring for you or your wound.    Day of surgery:  Your arrival time is approximately two hours before your scheduled surgery time.  Upon arrival, a Pre-op nurse and Anesthesiologist will review your health history, obtain vital signs, and answer questions you may have.  The only belongings needed at this time will be a list of your home medications and if applicable your C-PAP/BI-PAP machine.  If you are staying overnight your family can leave the rest of your belongings in the car and bring them to your room later.  A Pre-op nurse will start an IV and you may receive medication in preparation for surgery, including something to help you relax.  Your family will be able to see you in the Pre-op area.  Two visitors at a time will be allowed in the Pre-op room.  While you are in surgery your family should notify the waiting room  if they leave the  waiting room area and provide a contact phone number.    Please be aware that surgery does come with discomfort.  We want to make every effort to control your discomfort so please discuss any uncontrolled symptoms with your nurse.   Your doctor will most likely have prescribed pain medications.      If you are going home after surgery you will receive individualized written care instructions before being discharged.  A responsible adult must drive you to and from the hospital on the day of your surgery and stay with you for 24 hours.    If you are staying overnight following surgery, you will be transported to your hospital room following the recovery period.  [facility] has all private rooms.    If you have any questions please call Pre-Admission Testing at [phone].  Deductibles and co-payments are collected on the day of service. Please be prepared to pay the required co-pay, deductible or deposit on the day of service as defined by your plan    .CHLORHEXIDINE CLOTH INSTRUCTIONS  The morning of surgery follow these instructions using the Chlorhexidine cloths you've been given.  These steps reduce bacteria on the body.  Do not use the cloths near your eyes, ears mouth, genitalia or on open wounds.  Throw the cloths away after use but do not try to flush them down a toilet.      • Open and remove one cloth at a time from the package.    • Leave the cloth unfolded and begin the bathing.  • Massage the skin with the cloths using gentle pressure to remove bacteria.  Do not scrub harshly.   • Follow the steps below with one 2% CHG cloth per area (6 total cloths).  • One cloth for neck, shoulders and chest.  • One cloth for both arms, hands, fingers and underarms (do underarms last).  • One cloth for the abdomen followed by groin.  • One cloth for right leg and foot including between the toes.  • One cloth for left leg and foot including between the toes.  • The last cloth is to be used for the back  of the neck, back and buttocks.    Allow the CHG to air dry 3 minutes on the skin which will give it time to work and decrease the chance of irritation.  The skin may feel sticky until it is dry.  Do not rinse with water or any other liquid or you will lose the beneficial effects of the CHG.  If mild skin irritation occurs, do rinse the skin to remove the CHG.  Report this to the nurse at time of admission.  Do not apply lotions, creams, ointments, deodorants or perfumes after using the clothes. Dress in clean clothes before coming to the hospital.    BACTROBAN NASAL OINTMENT  There are many germs normally in your nose. Bactroban is an ointment that will help reduce these germs. Please follow these instructions for Bactroban use:      ____The day before surgery in the morning  Date__3/09______    ____The day before surgery in the evening              Date___3/09_____    ____The day of surgery in the morning    Date__3/10______    **Squirt ½ package of Bactroban Ointment onto a cotton applicator and apply to inside of 1st nostril.  Squirt the remaining Bactroban and apply to the inside of the other nostril.

## 2020-03-09 PROBLEM — Z96.611 S/P REVERSE TOTAL SHOULDER ARTHROPLASTY, RIGHT: Status: ACTIVE | Noted: 2020-03-09

## 2020-03-10 ENCOUNTER — ANESTHESIA EVENT (OUTPATIENT)
Dept: PERIOP | Facility: HOSPITAL | Age: 65
End: 2020-03-10

## 2020-03-10 ENCOUNTER — HOSPITAL ENCOUNTER (INPATIENT)
Facility: HOSPITAL | Age: 65
LOS: 1 days | Discharge: HOME | End: 2020-03-11
Attending: ORTHOPAEDIC SURGERY | Admitting: ORTHOPAEDIC SURGERY

## 2020-03-10 ENCOUNTER — APPOINTMENT (OUTPATIENT)
Dept: GENERAL RADIOLOGY | Facility: HOSPITAL | Age: 65
End: 2020-03-10

## 2020-03-10 ENCOUNTER — ANESTHESIA (OUTPATIENT)
Dept: PERIOP | Facility: HOSPITAL | Age: 65
End: 2020-03-10

## 2020-03-10 LAB
GLUCOSE BLDC GLUCOMTR-MCNC: 184 MG/DL (ref 70–130)
GLUCOSE BLDC GLUCOMTR-MCNC: 224 MG/DL (ref 70–130)
GLUCOSE BLDC GLUCOMTR-MCNC: 264 MG/DL (ref 70–130)

## 2020-03-10 PROCEDURE — 0LS30ZZ REPOSITION RIGHT UPPER ARM TENDON, OPEN APPROACH: ICD-10-PCS | Performed by: ORTHOPAEDIC SURGERY

## 2020-03-10 PROCEDURE — 25010000002 MIDAZOLAM PER 1 MG: Performed by: ANESTHESIOLOGY

## 2020-03-10 PROCEDURE — 25010000002 NEOSTIGMINE PER 0.5 MG: Performed by: NURSE ANESTHETIST, CERTIFIED REGISTERED

## 2020-03-10 PROCEDURE — 25010000002 MORPHINE PER 10 MG: Performed by: ORTHOPAEDIC SURGERY

## 2020-03-10 PROCEDURE — C9290 INJ, BUPIVACAINE LIPOSOME: HCPCS | Performed by: ORTHOPAEDIC SURGERY

## 2020-03-10 PROCEDURE — C1713 ANCHOR/SCREW BN/BN,TIS/BN: HCPCS | Performed by: ORTHOPAEDIC SURGERY

## 2020-03-10 PROCEDURE — 25010000002 ROPIVACAINE PER 1 MG: Performed by: ANESTHESIOLOGY

## 2020-03-10 PROCEDURE — 63710000001 ONDANSETRON PER 8 MG: Performed by: ORTHOPAEDIC SURGERY

## 2020-03-10 PROCEDURE — 25010000003 CEFAZOLIN PER 500 MG: Performed by: ORTHOPAEDIC SURGERY

## 2020-03-10 PROCEDURE — 25010000002 FENTANYL CITRATE (PF) 100 MCG/2ML SOLUTION: Performed by: NURSE ANESTHETIST, CERTIFIED REGISTERED

## 2020-03-10 PROCEDURE — 25010000002 ONDANSETRON PER 1 MG: Performed by: NURSE ANESTHETIST, CERTIFIED REGISTERED

## 2020-03-10 PROCEDURE — 73060 X-RAY EXAM OF HUMERUS: CPT

## 2020-03-10 PROCEDURE — 25010000002 PHENYLEPHRINE PER 1 ML: Performed by: NURSE ANESTHETIST, CERTIFIED REGISTERED

## 2020-03-10 PROCEDURE — 82962 GLUCOSE BLOOD TEST: CPT

## 2020-03-10 PROCEDURE — 0RRJ00Z REPLACEMENT OF RIGHT SHOULDER JOINT WITH REVERSE BALL AND SOCKET SYNTHETIC SUBSTITUTE, OPEN APPROACH: ICD-10-PCS | Performed by: ORTHOPAEDIC SURGERY

## 2020-03-10 PROCEDURE — 25010000003 CEFAZOLIN IN DEXTROSE 2-4 GM/100ML-% SOLUTION: Performed by: ORTHOPAEDIC SURGERY

## 2020-03-10 PROCEDURE — 25010000002 FENTANYL CITRATE (PF) 100 MCG/2ML SOLUTION: Performed by: ANESTHESIOLOGY

## 2020-03-10 PROCEDURE — 0PHF04Z INSERTION OF INTERNAL FIXATION DEVICE INTO RIGHT HUMERAL SHAFT, OPEN APPROACH: ICD-10-PCS | Performed by: ORTHOPAEDIC SURGERY

## 2020-03-10 PROCEDURE — 25010000003 MEPIVACAINE PER 10 ML: Performed by: ANESTHESIOLOGY

## 2020-03-10 PROCEDURE — 76000 FLUOROSCOPY <1 HR PHYS/QHP: CPT

## 2020-03-10 PROCEDURE — C1776 JOINT DEVICE (IMPLANTABLE): HCPCS | Performed by: ORTHOPAEDIC SURGERY

## 2020-03-10 PROCEDURE — 25010000002 ONDANSETRON PER 1 MG: Performed by: ORTHOPAEDIC SURGERY

## 2020-03-10 PROCEDURE — 25010000003 BUPIVACAINE LIPOSOME 1.3 % SUSPENSION 10 ML VIAL: Performed by: ORTHOPAEDIC SURGERY

## 2020-03-10 PROCEDURE — 25010000002 PROPOFOL 10 MG/ML EMULSION: Performed by: NURSE ANESTHETIST, CERTIFIED REGISTERED

## 2020-03-10 DEVICE — LINER HUM/SHLDR TRABECULARMETAL REV POLY 65DEG 36MM PLS6: Type: IMPLANTABLE DEVICE | Site: SHOULDER | Status: FUNCTIONAL

## 2020-03-10 DEVICE — INVERSE/REVERSE SCREW SYSTEM, 4.5-36
Type: IMPLANTABLE DEVICE | Site: SHOULDER | Status: FUNCTIONAL
Brand: INVERSE/REVERSE

## 2020-03-10 DEVICE — NCB STR NRW PLT, 8 H, L 118MM
Type: IMPLANTABLE DEVICE | Site: SHOULDER | Status: FUNCTIONAL
Brand: NCB®

## 2020-03-10 DEVICE — IMPLANTABLE DEVICE: Type: IMPLANTABLE DEVICE | Site: SHOULDER | Status: FUNCTIONAL

## 2020-03-10 DEVICE — GLENSPHR TRABECULARMETAL REV 36MM: Type: IMPLANTABLE DEVICE | Site: SHOULDER | Status: FUNCTIONAL

## 2020-03-10 DEVICE — TOTL SHLDER REV: Type: IMPLANTABLE DEVICE | Site: SHOULDER | Status: FUNCTIONAL

## 2020-03-10 DEVICE — NCB SCREW D 4.0 SELF-TAPPING, 26
Type: IMPLANTABLE DEVICE | Site: SHOULDER | Status: FUNCTIONAL
Brand: NCB®

## 2020-03-10 DEVICE — BASEPLT GLEN TRABECULARMETAL REV 15MM: Type: IMPLANTABLE DEVICE | Site: SHOULDER | Status: FUNCTIONAL

## 2020-03-10 DEVICE — CABL CERCLG GTR COCR 1.8MM 63.5CM: Type: IMPLANTABLE DEVICE | Site: SHOULDER | Status: FUNCTIONAL

## 2020-03-10 DEVICE — KT SHLDR DRL PIN SPG: Type: IMPLANTABLE DEVICE | Site: SHOULDER | Status: FUNCTIONAL

## 2020-03-10 DEVICE — INVERSE/REVERSE SCREW SYSTEM, 4.5-33
Type: IMPLANTABLE DEVICE | Site: SHOULDER | Status: FUNCTIONAL
Brand: INVERSE/REVERSE

## 2020-03-10 DEVICE — NCB SCREW D 4.0 SELF-TAPPING, 30
Type: IMPLANTABLE DEVICE | Site: SHOULDER | Status: FUNCTIONAL
Brand: NCB®

## 2020-03-10 RX ORDER — LIDOCAINE HYDROCHLORIDE 10 MG/ML
0.5 INJECTION, SOLUTION EPIDURAL; INFILTRATION; INTRACAUDAL; PERINEURAL ONCE AS NEEDED
Status: DISCONTINUED | OUTPATIENT
Start: 2020-03-10 | End: 2020-03-10 | Stop reason: HOSPADM

## 2020-03-10 RX ORDER — MIDAZOLAM HYDROCHLORIDE 1 MG/ML
1 INJECTION INTRAMUSCULAR; INTRAVENOUS
Status: DISCONTINUED | OUTPATIENT
Start: 2020-03-10 | End: 2020-03-10 | Stop reason: HOSPADM

## 2020-03-10 RX ORDER — LIDOCAINE HYDROCHLORIDE 20 MG/ML
INJECTION, SOLUTION INFILTRATION; PERINEURAL AS NEEDED
Status: DISCONTINUED | OUTPATIENT
Start: 2020-03-10 | End: 2020-03-10 | Stop reason: SURG

## 2020-03-10 RX ORDER — DIPHENHYDRAMINE HCL 25 MG
25 CAPSULE ORAL EVERY 6 HOURS PRN
Status: DISCONTINUED | OUTPATIENT
Start: 2020-03-10 | End: 2020-03-11 | Stop reason: HOSPADM

## 2020-03-10 RX ORDER — PROMETHAZINE HYDROCHLORIDE 25 MG/ML
12.5 INJECTION, SOLUTION INTRAMUSCULAR; INTRAVENOUS ONCE AS NEEDED
Status: DISCONTINUED | OUTPATIENT
Start: 2020-03-10 | End: 2020-03-10 | Stop reason: HOSPADM

## 2020-03-10 RX ORDER — ONDANSETRON 2 MG/ML
INJECTION INTRAMUSCULAR; INTRAVENOUS AS NEEDED
Status: DISCONTINUED | OUTPATIENT
Start: 2020-03-10 | End: 2020-03-10 | Stop reason: SURG

## 2020-03-10 RX ORDER — NALOXONE HCL 0.4 MG/ML
0.2 VIAL (ML) INJECTION AS NEEDED
Status: DISCONTINUED | OUTPATIENT
Start: 2020-03-10 | End: 2020-03-10 | Stop reason: HOSPADM

## 2020-03-10 RX ORDER — ONDANSETRON 4 MG/1
4 TABLET, FILM COATED ORAL EVERY 6 HOURS PRN
Status: DISCONTINUED | OUTPATIENT
Start: 2020-03-10 | End: 2020-03-11 | Stop reason: HOSPADM

## 2020-03-10 RX ORDER — GLIMEPIRIDE 2 MG/1
2 TABLET ORAL DAILY
Status: DISCONTINUED | OUTPATIENT
Start: 2020-03-10 | End: 2020-03-10

## 2020-03-10 RX ORDER — LEFLUNOMIDE 20 MG/1
20 TABLET ORAL NIGHTLY
Status: DISCONTINUED | OUTPATIENT
Start: 2020-03-10 | End: 2020-03-11 | Stop reason: HOSPADM

## 2020-03-10 RX ORDER — MORPHINE SULFATE 2 MG/ML
2 INJECTION, SOLUTION INTRAMUSCULAR; INTRAVENOUS EVERY 4 HOURS PRN
Status: DISCONTINUED | OUTPATIENT
Start: 2020-03-10 | End: 2020-03-11 | Stop reason: HOSPADM

## 2020-03-10 RX ORDER — ACETAMINOPHEN 650 MG/1
650 SUPPOSITORY RECTAL ONCE AS NEEDED
Status: DISCONTINUED | OUTPATIENT
Start: 2020-03-10 | End: 2020-03-10 | Stop reason: HOSPADM

## 2020-03-10 RX ORDER — LISINOPRIL 5 MG/1
5 TABLET ORAL EVERY MORNING
Status: DISCONTINUED | OUTPATIENT
Start: 2020-03-11 | End: 2020-03-11 | Stop reason: HOSPADM

## 2020-03-10 RX ORDER — ONDANSETRON 2 MG/ML
4 INJECTION INTRAMUSCULAR; INTRAVENOUS ONCE AS NEEDED
Status: COMPLETED | OUTPATIENT
Start: 2020-03-10 | End: 2020-03-10

## 2020-03-10 RX ORDER — ONDANSETRON 2 MG/ML
4 INJECTION INTRAMUSCULAR; INTRAVENOUS EVERY 6 HOURS PRN
Status: DISCONTINUED | OUTPATIENT
Start: 2020-03-10 | End: 2020-03-11 | Stop reason: HOSPADM

## 2020-03-10 RX ORDER — DIPHENHYDRAMINE HYDROCHLORIDE 50 MG/ML
12.5 INJECTION INTRAMUSCULAR; INTRAVENOUS
Status: DISCONTINUED | OUTPATIENT
Start: 2020-03-10 | End: 2020-03-10 | Stop reason: HOSPADM

## 2020-03-10 RX ORDER — MIDAZOLAM HYDROCHLORIDE 1 MG/ML
2 INJECTION INTRAMUSCULAR; INTRAVENOUS
Status: DISCONTINUED | OUTPATIENT
Start: 2020-03-10 | End: 2020-03-10 | Stop reason: HOSPADM

## 2020-03-10 RX ORDER — PROMETHAZINE HYDROCHLORIDE 25 MG/1
25 SUPPOSITORY RECTAL ONCE AS NEEDED
Status: DISCONTINUED | OUTPATIENT
Start: 2020-03-10 | End: 2020-03-10 | Stop reason: HOSPADM

## 2020-03-10 RX ORDER — PROMETHAZINE HYDROCHLORIDE 25 MG/ML
12.5 INJECTION, SOLUTION INTRAMUSCULAR; INTRAVENOUS EVERY 4 HOURS PRN
Status: DISCONTINUED | OUTPATIENT
Start: 2020-03-10 | End: 2020-03-11 | Stop reason: HOSPADM

## 2020-03-10 RX ORDER — FENTANYL CITRATE 50 UG/ML
50 INJECTION, SOLUTION INTRAMUSCULAR; INTRAVENOUS
Status: DISCONTINUED | OUTPATIENT
Start: 2020-03-10 | End: 2020-03-10 | Stop reason: HOSPADM

## 2020-03-10 RX ORDER — NALOXONE HCL 0.4 MG/ML
0.4 VIAL (ML) INJECTION
Status: DISCONTINUED | OUTPATIENT
Start: 2020-03-10 | End: 2020-03-11 | Stop reason: HOSPADM

## 2020-03-10 RX ORDER — EPHEDRINE SULFATE 50 MG/ML
5 INJECTION, SOLUTION INTRAVENOUS ONCE AS NEEDED
Status: DISCONTINUED | OUTPATIENT
Start: 2020-03-10 | End: 2020-03-10 | Stop reason: HOSPADM

## 2020-03-10 RX ORDER — PROPOFOL 10 MG/ML
VIAL (ML) INTRAVENOUS AS NEEDED
Status: DISCONTINUED | OUTPATIENT
Start: 2020-03-10 | End: 2020-03-10 | Stop reason: SURG

## 2020-03-10 RX ORDER — OXYCODONE AND ACETAMINOPHEN 7.5; 325 MG/1; MG/1
1 TABLET ORAL ONCE AS NEEDED
Status: DISCONTINUED | OUTPATIENT
Start: 2020-03-10 | End: 2020-03-10 | Stop reason: HOSPADM

## 2020-03-10 RX ORDER — ACETAMINOPHEN 325 MG/1
650 TABLET ORAL ONCE AS NEEDED
Status: DISCONTINUED | OUTPATIENT
Start: 2020-03-10 | End: 2020-03-10 | Stop reason: HOSPADM

## 2020-03-10 RX ORDER — PROMETHAZINE HYDROCHLORIDE 25 MG/ML
6.25 INJECTION, SOLUTION INTRAMUSCULAR; INTRAVENOUS
Status: DISCONTINUED | OUTPATIENT
Start: 2020-03-10 | End: 2020-03-10 | Stop reason: HOSPADM

## 2020-03-10 RX ORDER — CEFAZOLIN SODIUM 2 G/100ML
2 INJECTION, SOLUTION INTRAVENOUS EVERY 8 HOURS
Status: COMPLETED | OUTPATIENT
Start: 2020-03-10 | End: 2020-03-11

## 2020-03-10 RX ORDER — SODIUM CHLORIDE 0.9 % (FLUSH) 0.9 %
3-10 SYRINGE (ML) INJECTION AS NEEDED
Status: DISCONTINUED | OUTPATIENT
Start: 2020-03-10 | End: 2020-03-10 | Stop reason: HOSPADM

## 2020-03-10 RX ORDER — FENTANYL CITRATE 50 UG/ML
INJECTION, SOLUTION INTRAMUSCULAR; INTRAVENOUS AS NEEDED
Status: DISCONTINUED | OUTPATIENT
Start: 2020-03-10 | End: 2020-03-10 | Stop reason: SURG

## 2020-03-10 RX ORDER — GLIMEPIRIDE 2 MG/1
2 TABLET ORAL DAILY
Status: DISCONTINUED | OUTPATIENT
Start: 2020-03-11 | End: 2020-03-11 | Stop reason: HOSPADM

## 2020-03-10 RX ORDER — SODIUM CHLORIDE 0.9 % (FLUSH) 0.9 %
3 SYRINGE (ML) INJECTION EVERY 12 HOURS SCHEDULED
Status: DISCONTINUED | OUTPATIENT
Start: 2020-03-10 | End: 2020-03-10 | Stop reason: HOSPADM

## 2020-03-10 RX ORDER — EPHEDRINE SULFATE 50 MG/ML
INJECTION, SOLUTION INTRAVENOUS AS NEEDED
Status: DISCONTINUED | OUTPATIENT
Start: 2020-03-10 | End: 2020-03-10 | Stop reason: SURG

## 2020-03-10 RX ORDER — ZOLPIDEM TARTRATE 5 MG/1
5 TABLET ORAL NIGHTLY PRN
Status: DISCONTINUED | OUTPATIENT
Start: 2020-03-10 | End: 2020-03-11 | Stop reason: HOSPADM

## 2020-03-10 RX ORDER — ROPIVACAINE HYDROCHLORIDE 5 MG/ML
INJECTION, SOLUTION EPIDURAL; INFILTRATION; PERINEURAL
Status: COMPLETED | OUTPATIENT
Start: 2020-03-10 | End: 2020-03-10

## 2020-03-10 RX ORDER — FAMOTIDINE 10 MG/ML
20 INJECTION, SOLUTION INTRAVENOUS ONCE
Status: COMPLETED | OUTPATIENT
Start: 2020-03-10 | End: 2020-03-10

## 2020-03-10 RX ORDER — OMEPRAZOLE 40 MG/1
40 CAPSULE, DELAYED RELEASE ORAL DAILY
Status: DISCONTINUED | OUTPATIENT
Start: 2020-03-11 | End: 2020-03-11 | Stop reason: HOSPADM

## 2020-03-10 RX ORDER — ESCITALOPRAM OXALATE 20 MG/1
20 TABLET ORAL NIGHTLY
Status: DISCONTINUED | OUTPATIENT
Start: 2020-03-10 | End: 2020-03-11 | Stop reason: HOSPADM

## 2020-03-10 RX ORDER — HYDROCODONE BITARTRATE AND ACETAMINOPHEN 7.5; 325 MG/1; MG/1
1 TABLET ORAL ONCE AS NEEDED
Status: DISCONTINUED | OUTPATIENT
Start: 2020-03-10 | End: 2020-03-10 | Stop reason: HOSPADM

## 2020-03-10 RX ORDER — OXYCODONE AND ACETAMINOPHEN 7.5; 325 MG/1; MG/1
1 TABLET ORAL EVERY 4 HOURS PRN
Status: DISCONTINUED | OUTPATIENT
Start: 2020-03-10 | End: 2020-03-11 | Stop reason: HOSPADM

## 2020-03-10 RX ORDER — SODIUM CHLORIDE, SODIUM LACTATE, POTASSIUM CHLORIDE, CALCIUM CHLORIDE 600; 310; 30; 20 MG/100ML; MG/100ML; MG/100ML; MG/100ML
9 INJECTION, SOLUTION INTRAVENOUS CONTINUOUS
Status: DISCONTINUED | OUTPATIENT
Start: 2020-03-10 | End: 2020-03-11 | Stop reason: HOSPADM

## 2020-03-10 RX ORDER — HYDROCODONE BITARTRATE AND ACETAMINOPHEN 5; 325 MG/1; MG/1
1 TABLET ORAL EVERY 6 HOURS PRN
Status: DISCONTINUED | OUTPATIENT
Start: 2020-03-10 | End: 2020-03-11 | Stop reason: HOSPADM

## 2020-03-10 RX ORDER — OMEPRAZOLE 40 MG/1
40 CAPSULE, DELAYED RELEASE ORAL DAILY
Status: DISCONTINUED | OUTPATIENT
Start: 2020-03-10 | End: 2020-03-10

## 2020-03-10 RX ORDER — ROCURONIUM BROMIDE 10 MG/ML
INJECTION, SOLUTION INTRAVENOUS AS NEEDED
Status: DISCONTINUED | OUTPATIENT
Start: 2020-03-10 | End: 2020-03-10 | Stop reason: SURG

## 2020-03-10 RX ORDER — LABETALOL HYDROCHLORIDE 5 MG/ML
5 INJECTION, SOLUTION INTRAVENOUS
Status: DISCONTINUED | OUTPATIENT
Start: 2020-03-10 | End: 2020-03-10 | Stop reason: HOSPADM

## 2020-03-10 RX ORDER — FLUMAZENIL 0.1 MG/ML
0.2 INJECTION INTRAVENOUS AS NEEDED
Status: DISCONTINUED | OUTPATIENT
Start: 2020-03-10 | End: 2020-03-10 | Stop reason: HOSPADM

## 2020-03-10 RX ORDER — HYDRALAZINE HYDROCHLORIDE 20 MG/ML
5 INJECTION INTRAMUSCULAR; INTRAVENOUS
Status: DISCONTINUED | OUTPATIENT
Start: 2020-03-10 | End: 2020-03-10 | Stop reason: HOSPADM

## 2020-03-10 RX ORDER — CEFAZOLIN SODIUM 2 G/100ML
2 INJECTION, SOLUTION INTRAVENOUS ONCE
Status: COMPLETED | OUTPATIENT
Start: 2020-03-10 | End: 2020-03-10

## 2020-03-10 RX ORDER — ROPINIROLE 2 MG/1
2 TABLET, FILM COATED ORAL NIGHTLY
Status: DISCONTINUED | OUTPATIENT
Start: 2020-03-10 | End: 2020-03-11 | Stop reason: HOSPADM

## 2020-03-10 RX ORDER — DIPHENHYDRAMINE HCL 25 MG
25 CAPSULE ORAL
Status: DISCONTINUED | OUTPATIENT
Start: 2020-03-10 | End: 2020-03-10 | Stop reason: HOSPADM

## 2020-03-10 RX ORDER — OXYCODONE AND ACETAMINOPHEN 10; 325 MG/1; MG/1
1 TABLET ORAL EVERY 4 HOURS PRN
Status: DISCONTINUED | OUTPATIENT
Start: 2020-03-10 | End: 2020-03-11 | Stop reason: HOSPADM

## 2020-03-10 RX ORDER — CYCLOBENZAPRINE HCL 10 MG
10 TABLET ORAL 3 TIMES DAILY PRN
Status: DISCONTINUED | OUTPATIENT
Start: 2020-03-10 | End: 2020-03-11 | Stop reason: HOSPADM

## 2020-03-10 RX ORDER — GLYCOPYRROLATE 0.2 MG/ML
INJECTION INTRAMUSCULAR; INTRAVENOUS AS NEEDED
Status: DISCONTINUED | OUTPATIENT
Start: 2020-03-10 | End: 2020-03-10 | Stop reason: SURG

## 2020-03-10 RX ORDER — HYDROMORPHONE HYDROCHLORIDE 1 MG/ML
0.5 INJECTION, SOLUTION INTRAMUSCULAR; INTRAVENOUS; SUBCUTANEOUS
Status: DISCONTINUED | OUTPATIENT
Start: 2020-03-10 | End: 2020-03-10 | Stop reason: HOSPADM

## 2020-03-10 RX ORDER — PROMETHAZINE HYDROCHLORIDE 25 MG/1
25 TABLET ORAL ONCE AS NEEDED
Status: DISCONTINUED | OUTPATIENT
Start: 2020-03-10 | End: 2020-03-10 | Stop reason: HOSPADM

## 2020-03-10 RX ORDER — WOUND DRESSING ADHESIVE - LIQUID
LIQUID MISCELLANEOUS AS NEEDED
Status: DISCONTINUED | OUTPATIENT
Start: 2020-03-10 | End: 2020-03-10 | Stop reason: HOSPADM

## 2020-03-10 RX ORDER — HYDROCHLOROTHIAZIDE 12.5 MG/1
12.5 CAPSULE, GELATIN COATED ORAL EVERY MORNING
Status: DISCONTINUED | OUTPATIENT
Start: 2020-03-11 | End: 2020-03-11 | Stop reason: HOSPADM

## 2020-03-10 RX ORDER — MAGNESIUM HYDROXIDE 1200 MG/15ML
LIQUID ORAL AS NEEDED
Status: DISCONTINUED | OUTPATIENT
Start: 2020-03-10 | End: 2020-03-10 | Stop reason: HOSPADM

## 2020-03-10 RX ORDER — DIAZEPAM 5 MG/1
5 TABLET ORAL EVERY 6 HOURS PRN
Status: DISCONTINUED | OUTPATIENT
Start: 2020-03-10 | End: 2020-03-11 | Stop reason: HOSPADM

## 2020-03-10 RX ADMIN — PHENYLEPHRINE HYDROCHLORIDE 100 MCG: 10 INJECTION INTRAVENOUS at 08:52

## 2020-03-10 RX ADMIN — ROCURONIUM BROMIDE 10 MG: 10 INJECTION, SOLUTION INTRAVENOUS at 10:13

## 2020-03-10 RX ADMIN — ONDANSETRON HYDROCHLORIDE 4 MG: 4 TABLET, FILM COATED ORAL at 22:32

## 2020-03-10 RX ADMIN — PHENYLEPHRINE HYDROCHLORIDE 50 MCG: 10 INJECTION INTRAVENOUS at 08:54

## 2020-03-10 RX ADMIN — PROPOFOL 200 MG: 10 INJECTION, EMULSION INTRAVENOUS at 08:14

## 2020-03-10 RX ADMIN — SODIUM CHLORIDE, POTASSIUM CHLORIDE, SODIUM LACTATE AND CALCIUM CHLORIDE 9 ML/HR: 600; 310; 30; 20 INJECTION, SOLUTION INTRAVENOUS at 07:32

## 2020-03-10 RX ADMIN — EPHEDRINE SULFATE 5 MG: 50 INJECTION INTRAVENOUS at 09:55

## 2020-03-10 RX ADMIN — GLYCOPYRROLATE 0.6 MG: 0.2 INJECTION INTRAMUSCULAR; INTRAVENOUS at 10:58

## 2020-03-10 RX ADMIN — SODIUM CHLORIDE, POTASSIUM CHLORIDE, SODIUM LACTATE AND CALCIUM CHLORIDE: 600; 310; 30; 20 INJECTION, SOLUTION INTRAVENOUS at 09:36

## 2020-03-10 RX ADMIN — EPHEDRINE SULFATE 5 MG: 50 INJECTION INTRAVENOUS at 10:15

## 2020-03-10 RX ADMIN — OXYCODONE HYDROCHLORIDE AND ACETAMINOPHEN 1 TABLET: 10; 325 TABLET ORAL at 18:28

## 2020-03-10 RX ADMIN — ROPINIROLE 2 MG: 2 TABLET, FILM COATED ORAL at 21:24

## 2020-03-10 RX ADMIN — PHENYLEPHRINE HYDROCHLORIDE 50 MCG: 10 INJECTION INTRAVENOUS at 09:23

## 2020-03-10 RX ADMIN — PHENYLEPHRINE HYDROCHLORIDE 100 MCG: 10 INJECTION INTRAVENOUS at 09:45

## 2020-03-10 RX ADMIN — FENTANYL CITRATE 50 MCG: 50 INJECTION INTRAMUSCULAR; INTRAVENOUS at 11:07

## 2020-03-10 RX ADMIN — CEFAZOLIN SODIUM 2 G: 2 INJECTION, SOLUTION INTRAVENOUS at 19:34

## 2020-03-10 RX ADMIN — LIDOCAINE HYDROCHLORIDE 60 MG: 20 INJECTION, SOLUTION INFILTRATION; PERINEURAL at 08:14

## 2020-03-10 RX ADMIN — EPHEDRINE SULFATE 5 MG: 50 INJECTION INTRAVENOUS at 10:02

## 2020-03-10 RX ADMIN — METOPROLOL SUCCINATE 75 MG: 25 TABLET, EXTENDED RELEASE ORAL at 21:23

## 2020-03-10 RX ADMIN — PHENYLEPHRINE HYDROCHLORIDE 50 MCG: 10 INJECTION INTRAVENOUS at 08:47

## 2020-03-10 RX ADMIN — OXYCODONE HYDROCHLORIDE AND ACETAMINOPHEN 1 TABLET: 10; 325 TABLET ORAL at 22:32

## 2020-03-10 RX ADMIN — PHENYLEPHRINE HYDROCHLORIDE 50 MCG: 10 INJECTION INTRAVENOUS at 09:09

## 2020-03-10 RX ADMIN — PHENYLEPHRINE HYDROCHLORIDE 50 MCG: 10 INJECTION INTRAVENOUS at 08:42

## 2020-03-10 RX ADMIN — PHENYLEPHRINE HYDROCHLORIDE 100 MCG: 10 INJECTION INTRAVENOUS at 09:37

## 2020-03-10 RX ADMIN — PHENYLEPHRINE HYDROCHLORIDE 100 MCG: 10 INJECTION INTRAVENOUS at 09:33

## 2020-03-10 RX ADMIN — PHENYLEPHRINE HYDROCHLORIDE 100 MCG: 10 INJECTION INTRAVENOUS at 09:46

## 2020-03-10 RX ADMIN — FENTANYL CITRATE 50 MCG: 50 INJECTION INTRAMUSCULAR; INTRAVENOUS at 12:04

## 2020-03-10 RX ADMIN — FAMOTIDINE 20 MG: 10 INJECTION INTRAVENOUS at 07:33

## 2020-03-10 RX ADMIN — Medication 4 MG: at 10:58

## 2020-03-10 RX ADMIN — ROPIVACAINE HYDROCHLORIDE 20 ML: 5 INJECTION, SOLUTION EPIDURAL; INFILTRATION; PERINEURAL at 07:49

## 2020-03-10 RX ADMIN — ONDANSETRON 4 MG: 2 INJECTION INTRAMUSCULAR; INTRAVENOUS at 12:31

## 2020-03-10 RX ADMIN — CEFAZOLIN SODIUM 1 G: 2 INJECTION, SOLUTION INTRAVENOUS at 11:10

## 2020-03-10 RX ADMIN — MIDAZOLAM 2 MG: 1 INJECTION INTRAMUSCULAR; INTRAVENOUS at 07:39

## 2020-03-10 RX ADMIN — PHENYLEPHRINE HYDROCHLORIDE 50 MCG: 10 INJECTION INTRAVENOUS at 10:15

## 2020-03-10 RX ADMIN — PHENYLEPHRINE HYDROCHLORIDE 100 MCG: 10 INJECTION INTRAVENOUS at 09:18

## 2020-03-10 RX ADMIN — PHENYLEPHRINE HYDROCHLORIDE 100 MCG: 10 INJECTION INTRAVENOUS at 09:27

## 2020-03-10 RX ADMIN — PHENYLEPHRINE HYDROCHLORIDE 50 MCG: 10 INJECTION INTRAVENOUS at 10:02

## 2020-03-10 RX ADMIN — FENTANYL CITRATE 50 MCG: 50 INJECTION INTRAMUSCULAR; INTRAVENOUS at 07:39

## 2020-03-10 RX ADMIN — ROCURONIUM BROMIDE 10 MG: 10 INJECTION, SOLUTION INTRAVENOUS at 09:33

## 2020-03-10 RX ADMIN — ONDANSETRON 4 MG: 2 INJECTION INTRAMUSCULAR; INTRAVENOUS at 16:41

## 2020-03-10 RX ADMIN — LEFLUNOMIDE 20 MG: 20 TABLET ORAL at 23:51

## 2020-03-10 RX ADMIN — ROCURONIUM BROMIDE 50 MG: 10 INJECTION, SOLUTION INTRAVENOUS at 08:14

## 2020-03-10 RX ADMIN — ONDANSETRON HYDROCHLORIDE 4 MG: 2 SOLUTION INTRAMUSCULAR; INTRAVENOUS at 08:47

## 2020-03-10 RX ADMIN — PHENYLEPHRINE HYDROCHLORIDE 50 MCG: 10 INJECTION INTRAVENOUS at 08:45

## 2020-03-10 RX ADMIN — PHENYLEPHRINE HYDROCHLORIDE 50 MCG: 10 INJECTION INTRAVENOUS at 10:32

## 2020-03-10 RX ADMIN — PHENYLEPHRINE HYDROCHLORIDE 50 MCG: 10 INJECTION INTRAVENOUS at 09:52

## 2020-03-10 RX ADMIN — METFORMIN HYDROCHLORIDE 500 MG: 500 TABLET ORAL at 21:24

## 2020-03-10 RX ADMIN — DIAZEPAM 5 MG: 5 TABLET ORAL at 23:53

## 2020-03-10 RX ADMIN — EPHEDRINE SULFATE 5 MG: 50 INJECTION INTRAVENOUS at 09:52

## 2020-03-10 RX ADMIN — PHENYLEPHRINE HYDROCHLORIDE 50 MCG: 10 INJECTION INTRAVENOUS at 09:50

## 2020-03-10 RX ADMIN — PHENYLEPHRINE HYDROCHLORIDE 100 MCG: 10 INJECTION INTRAVENOUS at 09:55

## 2020-03-10 RX ADMIN — MEPIVACAINE HYDROCHLORIDE 10 ML: 15 INJECTION, SOLUTION EPIDURAL; INFILTRATION at 07:49

## 2020-03-10 RX ADMIN — MORPHINE SULFATE 2 MG: 2 INJECTION, SOLUTION INTRAMUSCULAR; INTRAVENOUS at 21:35

## 2020-03-10 RX ADMIN — FENTANYL CITRATE 50 MCG: 50 INJECTION INTRAMUSCULAR; INTRAVENOUS at 11:00

## 2020-03-10 RX ADMIN — CEFAZOLIN SODIUM 2 G: 2 INJECTION, SOLUTION INTRAVENOUS at 08:20

## 2020-03-10 RX ADMIN — EPHEDRINE SULFATE 5 MG: 50 INJECTION INTRAVENOUS at 10:32

## 2020-03-10 RX ADMIN — PHENYLEPHRINE HYDROCHLORIDE 200 MCG: 10 INJECTION INTRAVENOUS at 09:42

## 2020-03-10 RX ADMIN — CYCLOBENZAPRINE 10 MG: 10 TABLET, FILM COATED ORAL at 21:36

## 2020-03-10 RX ADMIN — EPHEDRINE SULFATE 5 MG: 50 INJECTION INTRAVENOUS at 09:50

## 2020-03-10 NOTE — NURSING NOTE
Call placed to room 31 spoke with Nydia BELL to inform Dr. Siu to enter floor orders. Spoke with Kasandra BELL 8 jaimie to  Let her know that I notf him.

## 2020-03-10 NOTE — PLAN OF CARE
See below.    Problem: Patient Care Overview  Goal: Plan of Care Review  Outcome: Ongoing (interventions implemented as appropriate)  Flowsheets  Taken 3/10/2020 2237  Progress: improving  Outcome Summary: 65/F POD#0 right reverse TSA with ORIF near right elbow.  ALOx4, RA, lungs clear, BS hypoactive, voiding independently.  Up x1 standby with sling in place.  2+ radial pulses noted bilaterally, operative extremity absent sensation r/t interscalene block, dressing CDI.  Pain well-controlled, no need for PRN meds at this time.  PIV saline locked.  Plans to D/C home with family tomorrow when ready.  Taken 3/10/2020 1307  Plan of Care Reviewed With: patient;spouse

## 2020-03-10 NOTE — ANESTHESIA PREPROCEDURE EVALUATION
Anesthesia Evaluation     Patient summary reviewed and Nursing notes reviewed   history of anesthetic complications: PONV               Airway   Mallampati: II  TM distance: >3 FB  Neck ROM: full  Dental - normal exam     Pulmonary - negative pulmonary ROS and normal exam   Cardiovascular - normal exam  Exercise tolerance: good (4-7 METS)    ECG reviewed  Rhythm: regular    (+) hypertension, dysrhythmias Tachycardia, hyperlipidemia,     ROS comment: - OTHERWISE NORMAL ECG -  Sinus rhythm  Low voltage, precordial leads  SINCE PREVIOUS TRACING,  HR HAS INCREASED    Neuro/Psych  (+) numbness,     GI/Hepatic/Renal/Endo    (+)  GERD,  diabetes mellitus,     Musculoskeletal     (+) back pain, radiculopathy  Abdominal  - normal exam    Bowel sounds: normal.   Substance History - negative use     OB/GYN negative ob/gyn ROS         Other   arthritis,        Other Comment: RA                  Anesthesia Plan    ASA 2     general with block       Anesthetic plan, all risks, benefits, and alternatives have been provided, discussed and informed consent has been obtained with: patient.    Plan discussed with CRNA.

## 2020-03-10 NOTE — BRIEF OP NOTE
TOTAL SHOULDER REVERSE ARTHROPLASTY  Progress Note    Kathy Ramirez  3/10/2020    Pre-op Diagnosis:   Rt rct, djd       Post-Op Diagnosis Codes:   same, poor bone quality    Procedure/CPT® Codes:      Procedure(s):  RIGHT TOTAL SHOULDER REVERSE ARTHROPLASTY, ORIF OF FRACTURE    Surgeon(s):  Festus Siu MD    Anesthesia: General with Block    Staff:   Circulator: Nydia Esteban RN  Radiology Technologist: Kayla Colon Goran, RRT  Scrub Person: Juliana Matt  Assistant: Carmen Tamez APRN    Estimated Blood Loss: 100ml    Urine Voided: * No values recorded between 3/10/2020  8:03 AM and 3/10/2020 10:58 AM *    Specimens:                None          Drains: * No LDAs found *    Findings: above    Complications: periprosthetic fx      DWIGHT Siu MD     Date: 3/10/2020  Time: 10:58

## 2020-03-10 NOTE — DISCHARGE INSTRUCTIONS
Total Shoulder Joint Replacement Discharge Instructions:  Fracture and fixation of the humerus    REMAIN IN THE SLING AND BRACE. NO LIFTING, PUSHING OR PULLING  ICE FOR COMFORT    III. Precautions:  ? Everyone that comes near you should wash their hands  ? No elective dental, genital-urinary, or colon procedures or surgical procedures for 12 weeks after surgery unless absolutely necessary.  ?  If dental work or surgical procedure is deemed absolutely necessary, you will need to contact your surgeon as you will need to take antibiotics 1 hour prior to any dental work (including teeth cleanings).  ? Please discuss with your surgeon prophylactic antibiotics as the length of time this intervention will be necessary for you varies with each patient’s health history and situation.  ? Avoid sick people. If you must be around someone who is ill, they should wear a mask.  ? Avoid visits to the Emergency Room or Urgent Care unless you are having a life threatening event.     IV. INCISION CARE:  ? Wash your hands prior to dressing changes  ? Change the dressing as needed to keep incision clean and dry. Utilize dry gauze and paper tape. Avoid touching the side of the gauze that goes against the incision with your hands.  ? No creams or ointments to the incision  ? May remove dressing once the incision is free of drainage  ? Do not touch or pick at the incision  ? Check incision every day and notify surgeon immediately if any of the following signs or symptoms are noted:  o Increase in redness  o Increase in swelling around the incision and of the entire extremity  o Increase in pain  o Drainage oozing from the incision  o Pulling apart of the edges of the incision  o Increase in overall body temperature (greater than 100.5 degrees)  ? Your surgeon will instruct you regarding suture or staple removal    V. Medications:     1. Stool Softeners: You will be at greater risk of constipation after surgery due to being less mobile and  the pain medications.   ? Take stool softeners as instructed by your surgeon while on pain medications. Over the counter Colace 100 mg 1-2 capsules twice daily.   ? If stools become too loose or too frequent, please decreases the dosage or stop the stool softener.  ? If constipation occurs despite use of stool softeners, you are to continue the stool softeners and add a laxative (Milk of Magnesia 1 ounce daily as needed)  ? Drink plenty of fluids, and eat fruits and vegetables during your recovery time    2. Pain Medications utilized after surgery are narcotics and the law requires that the following information be given to all patients that are prescribed narcotics:  ? CLASSIFICATION: Pain medications are called Opioids and are narcotics  ? LEGALITIES: It is illegal to share narcotics with others and to drive within 24 hours of taking narcotics  ? POTENTIAL SIDE EFFECTS: Potential side effects of opioids include: nausea, vomiting, itching, dizziness, drowsiness, dry mouth, constipation, and difficulty urinating.  ?  POTENTIAL ADVERSE EFFECTS:   o Opioid tolerance can develop with use of pain medications and this simply means that it requires more and more of the medication to control pain; however, this is seen more in patients that use opioids for longer periods of time.  o Opioid dependence can develop with use of Opioids and this simply means that to stop the medication can cause withdrawal symptoms; however, this is seen with patients that use Opioids for longer periods of time.  o Opioid addiction can develop with use of Opioids and the incidence of this is very unlikely in patients who take the medications as ordered and stop the medications as instructed.  o Opioid overdose can be dangerous, but is unlikely when the medication is taken as ordered and stopped when ordered. It is important not to mix opioids with alcohol or with and type of sedative such as Benadryl as this can lead to over sedation and  respiratory difficulty.  ? DOSAGE:   o Pain medications will need to be taken consistently for the first week to decrease pain and promote adequate pain relief and participation in physical therapy.  o After the initial surgical pain begins to resolve, you may begin to decrease the pain medication. By the end of 6 weeks, you should be off of pain medications.  o Refills will not be given by the office during evening hours, on weekends, or after 6 weeks post-op.  o To seek refills on pain medications during the initial 6 week post-operative period, you must call the office 48 hours in advance to request the refill. The office will then notify you when to  the prescription. DO NOT wait until you are out of the medication to request a refill.    V. FOLLOW-UP VISITS:  ? You will need to follow up in the office with your surgeon in  10-14 days. Please call this number  to schedule this appointment.  ? You will need to  follow up with your primary care physician in 4 weeks.  ? If you have any concerns or suspected complications prior to your follow up visit, please call your surgeons office. Do not wait until your appointment time if you suspect complications. These will need to be addressed in the office promptly.  ?

## 2020-03-10 NOTE — ANESTHESIA PROCEDURE NOTES
Airway  Urgency: elective    Date/Time: 3/10/2020 8:19 AM  Airway not difficult    General Information and Staff    Patient location during procedure: OR  Anesthesiologist: Elia De Oliveira MD  CRNA: Vani Cheek CRNA    Indications and Patient Condition  Indications for airway management: airway protection    Preoxygenated: yes  Mask difficulty assessment: 1 - vent by mask    Final Airway Details  Final airway type: endotracheal airway      Successful airway: ETT  Cuffed: yes   Successful intubation technique: direct laryngoscopy  Facilitating devices/methods: intubating stylet  Endotracheal tube insertion site: oral  Blade: Alvarez  Blade size: 2  ETT size (mm): 7.0  Cormack-Lehane Classification: grade I - full view of glottis  Placement verified by: chest auscultation and capnometry   Measured from: lips  ETT/EBT  to lips (cm): 20  Number of attempts at approach: 1  Assessment: lips, teeth, and gum same as pre-op and atraumatic intubation    Additional Comments  Atraumatic, MOP to cuff, BSBE, no change to dentition, secured with tape

## 2020-03-10 NOTE — ANESTHESIA POSTPROCEDURE EVALUATION
Patient: Kathy Ramirez    Procedure Summary     Date:  03/10/20 Room / Location:   [facility] [address] [place] [address] [facility]    Anesthesia Start:  0807 Anesthesia Stop:  1127    Procedure:  RIGHT TOTAL SHOULDER REVERSE ARTHROPLASTY, ORIF OF PERIPROSTHETIC FRACTURE (Right Shoulder) Diagnosis:      Surgeon:  Festus Siu MD Provider:  Elia De Oliveira MD    Anesthesia Type:  general with block ASA Status:  2          Anesthesia Type: general with block    Vitals  Vitals Value Taken Time   /90 3/10/2020 11:45 AM   Temp 36.5 °C (97.7 °F) 3/10/2020 11:23 AM   Pulse 96 3/10/2020 11:56 AM   Resp 16 3/10/2020 11:45 AM   SpO2 96 % 3/10/2020 11:56 AM   Vitals shown include unvalidated device data.        Post Anesthesia Care and Evaluation    Patient location during evaluation: bedside  Patient participation: complete - patient participated  Level of consciousness: awake and alert  Pain score: 0  Pain management: adequate  Airway patency: patent  Anesthetic complications: No anesthetic complications    Cardiovascular status: acceptable  Respiratory status: acceptable  Hydration status: acceptable    Comments: /90   Pulse 101   Temp 36.5 °C (97.7 °F) (Oral)   Resp 16   SpO2 98%

## 2020-03-10 NOTE — ANESTHESIA PROCEDURE NOTES
Peripheral Block    Pre-sedation assessment completed: 3/10/2020 7:35 AM    Patient reassessed immediately prior to procedure    Patient location during procedure: holding area  Start time: 3/10/2020 7:38 AM  Stop time: 3/10/2020 7:45 AM  Reason for block: at surgeon's request and post-op pain management  Performed by  Anesthesiologist: Selene Knapp MD  Preanesthetic Checklist  Completed: patient identified, site marked, surgical consent, pre-op evaluation, timeout performed, IV checked, risks and benefits discussed and monitors and equipment checked  Prep:  Pt Position: sitting  Sterile barriers:cap, gloves, mask and sterile barriers  Prep: ChloraPrep  Patient monitoring: blood pressure monitoring, continuous pulse oximetry and EKG  Procedure  Sedation:yes  Performed under: PNB  Guidance:ultrasound guided  ULTRASOUND INTERPRETATION.  Using ultrasound guidance a 22 G gauge needle was placed in close proximity to the brachial plexus nerve, at which point, under ultrasound guidance anesthetic was injected in the area of the nerve and spread of the anesthesia was seen on ultrasound in close proximity thereto.  There were no abnormalities seen on ultrasound; a digital image was taken; and the patient tolerated the procedure with no complications. Images:still images obtained    Laterality:right  Block Type:interscalene  Injection Technique:single-shot  Needle Type:echogenic  Needle Gauge:22 G  Resistance on Injection: none    Medications Used: ropivacaine (NAROPIN) 0.5 % injection, 20 mL  mepivacaine (CARBOCAINE) 1.5 % injection, 10 mL      Post Assessment  Injection Assessment: negative aspiration for heme, no paresthesia on injection and incremental injection  Patient Tolerance:comfortable throughout block  Complications:no

## 2020-03-11 VITALS
WEIGHT: 168.87 LBS | DIASTOLIC BLOOD PRESSURE: 70 MMHG | OXYGEN SATURATION: 98 % | RESPIRATION RATE: 18 BRPM | TEMPERATURE: 97.6 F | HEIGHT: 63 IN | SYSTOLIC BLOOD PRESSURE: 131 MMHG | BODY MASS INDEX: 29.92 KG/M2 | HEART RATE: 78 BPM

## 2020-03-11 LAB
ANION GAP SERPL CALCULATED.3IONS-SCNC: 13.6 MMOL/L (ref 5–15)
BUN BLD-MCNC: 9 MG/DL (ref 8–23)
BUN/CREAT SERPL: 10 (ref 7–25)
CALCIUM SPEC-SCNC: 7.6 MG/DL (ref 8.6–10.5)
CHLORIDE SERPL-SCNC: 97 MMOL/L (ref 98–107)
CO2 SERPL-SCNC: 21.4 MMOL/L (ref 22–29)
CREAT BLD-MCNC: 0.9 MG/DL (ref 0.57–1)
GFR SERPL CREATININE-BSD FRML MDRD: 62 ML/MIN/1.73
GLUCOSE BLD-MCNC: 244 MG/DL (ref 65–99)
GLUCOSE BLDC GLUCOMTR-MCNC: 254 MG/DL (ref 70–130)
GLUCOSE BLDC GLUCOMTR-MCNC: 256 MG/DL (ref 70–130)
HCT VFR BLD AUTO: 25.9 % (ref 34–46.6)
HGB BLD-MCNC: 8.5 G/DL (ref 12–15.9)
POTASSIUM BLD-SCNC: 3.8 MMOL/L (ref 3.5–5.2)
SODIUM BLD-SCNC: 132 MMOL/L (ref 136–145)

## 2020-03-11 PROCEDURE — 85018 HEMOGLOBIN: CPT | Performed by: ORTHOPAEDIC SURGERY

## 2020-03-11 PROCEDURE — 80048 BASIC METABOLIC PNL TOTAL CA: CPT | Performed by: ORTHOPAEDIC SURGERY

## 2020-03-11 PROCEDURE — 25010000002 MORPHINE PER 10 MG: Performed by: ORTHOPAEDIC SURGERY

## 2020-03-11 PROCEDURE — 25010000002 ONDANSETRON PER 1 MG: Performed by: ORTHOPAEDIC SURGERY

## 2020-03-11 PROCEDURE — 25010000002 ENOXAPARIN PER 10 MG: Performed by: ORTHOPAEDIC SURGERY

## 2020-03-11 PROCEDURE — 25010000003 CEFAZOLIN IN DEXTROSE 2-4 GM/100ML-% SOLUTION: Performed by: ORTHOPAEDIC SURGERY

## 2020-03-11 PROCEDURE — 85014 HEMATOCRIT: CPT | Performed by: ORTHOPAEDIC SURGERY

## 2020-03-11 PROCEDURE — 82962 GLUCOSE BLOOD TEST: CPT

## 2020-03-11 RX ORDER — OXYCODONE AND ACETAMINOPHEN 10; 325 MG/1; MG/1
2 TABLET ORAL EVERY 4 HOURS PRN
Status: DISCONTINUED | OUTPATIENT
Start: 2020-03-11 | End: 2020-03-11 | Stop reason: HOSPADM

## 2020-03-11 RX ADMIN — LINAGLIPTIN 5 MG: 5 TABLET, FILM COATED ORAL at 01:13

## 2020-03-11 RX ADMIN — ONDANSETRON 4 MG: 2 INJECTION INTRAMUSCULAR; INTRAVENOUS at 08:00

## 2020-03-11 RX ADMIN — OXYCODONE HYDROCHLORIDE AND ACETAMINOPHEN 2 TABLET: 10; 325 TABLET ORAL at 16:03

## 2020-03-11 RX ADMIN — CEFAZOLIN SODIUM 2 G: 2 INJECTION, SOLUTION INTRAVENOUS at 03:09

## 2020-03-11 RX ADMIN — ENOXAPARIN SODIUM 30 MG: 30 INJECTION SUBCUTANEOUS at 08:00

## 2020-03-11 RX ADMIN — MORPHINE SULFATE 2 MG: 2 INJECTION, SOLUTION INTRAMUSCULAR; INTRAVENOUS at 01:12

## 2020-03-11 RX ADMIN — CEFAZOLIN SODIUM 2 G: 2 INJECTION, SOLUTION INTRAVENOUS at 12:07

## 2020-03-11 RX ADMIN — OXYCODONE HYDROCHLORIDE AND ACETAMINOPHEN 2 TABLET: 10; 325 TABLET ORAL at 12:07

## 2020-03-11 RX ADMIN — OXYCODONE HYDROCHLORIDE AND ACETAMINOPHEN 2 TABLET: 10; 325 TABLET ORAL at 03:21

## 2020-03-11 RX ADMIN — METFORMIN HYDROCHLORIDE 500 MG: 500 TABLET ORAL at 07:59

## 2020-03-11 RX ADMIN — ONDANSETRON 4 MG: 2 INJECTION INTRAMUSCULAR; INTRAVENOUS at 16:03

## 2020-03-11 RX ADMIN — LISINOPRIL 5 MG: 5 TABLET ORAL at 08:00

## 2020-03-11 RX ADMIN — HYDROCHLOROTHIAZIDE 12.5 MG: 12.5 CAPSULE ORAL at 08:00

## 2020-03-11 RX ADMIN — OXYCODONE HYDROCHLORIDE AND ACETAMINOPHEN 2 TABLET: 10; 325 TABLET ORAL at 08:00

## 2020-03-11 NOTE — PROGRESS NOTES
Continued Stay Note  Psychiatric     Patient Name: Kathy Ramirez  MRN: [id number]  Today's Date: 3/11/2020    Admit Date: 3/10/2020    Discharge Plan     Row Name 03/11/20 1602       Plan    Final Discharge Disposition Code  01 - home or self-care    Final Note  Pt to d/c home, s/p TSA.        Discharge Codes    No documentation.       Expected Discharge Date and Time     Expected Discharge Date Expected Discharge Time    Mar 11, 2020             Grecia Masters RN

## 2020-03-11 NOTE — PLAN OF CARE
See below.    Problem: Patient Care Overview  Goal: Plan of Care Review  Outcome: Outcome(s) achieved  Flowsheets  Taken 3/11/2020 1616  Progress: improving  Outcome Summary: 65/F POD#1 right reverse TSA w/ ORIF.  ALOx4, RA, lungs clear, BS hypoactive but passing gas, voiding independently.  Up x1 standby BRP with sling.  2+ radial pulses bilaterally, no c/o numbness/tingling in operative extremity, dressing CDI.  Pain well-controlled with PO pain meds only.  PIV removed.  D/C home with family today per MD orders.  Taken 3/11/2020 0800  Plan of Care Reviewed With: patient

## 2020-03-11 NOTE — PLAN OF CARE
Problem: Patient Care Overview  Goal: Plan of Care Review  Flowsheets (Taken 3/11/2020 0154)  Progress: improving  Plan of Care Reviewed With: patient  Outcome Summary: POD#1 OF RIGHT SHOULDER. SLING IN PLACE. CONSTANT COMPLAIN OF PAIN. VOIDING PER BRP. AMBULATING WELL WITH 1 ASSIST. POSSIBLE D/C TODAY. EDUCATION PROVIDED ON BLOOD SUGAR MONITORING AND PAIN CONTROL. PATIENT VERBALIZED UNDERSTANDING.

## 2020-03-12 ENCOUNTER — READMISSION MANAGEMENT (OUTPATIENT)
Dept: CALL CENTER | Facility: HOSPITAL | Age: 65
End: 2020-03-12

## 2020-03-12 NOTE — DISCHARGE SUMMARY
Orthopedic Discharge Summary      Patient: Kathy Ramirez      YOB: 1955    Medical Record Number: [id number]    Attending Physician:Dr Festus Siu  Consulting Physician(s):   Date of Admission: 3/10/2020  6:42 AM  Date of Discharge: 3/11/2020      Patient Active Problem List   Diagnosis   • Lumbar canal stenosis   • Diabetes mellitus (CMS/HCC)   • Hypertension   • Rheumatoid arthritis (CMS/[facility])   • Cervical radiculitis   • Acute pain of left shoulder   • Family history of early CAD   • Hyperlipidemia LDL goal <100   • Orthostasis   • DDD (degenerative disc disease), lumbar   • History of echocardiogram   • S/P reverse total shoulder arthroplasty, right         Allergies: No Known Allergies    Current Medications:     Discharge Medications      New Medications      Instructions Start Date   aspirin  MG tablet   325 mg, Oral, Daily      cephalexin 500 MG capsule  Commonly known as:  KEFLEX   500 mg, Oral, 4 Times Daily      diazePAM 5 MG tablet  Commonly known as:  VALIUM   5 mg, Oral, Every 6 Hours PRN      oxyCODONE-acetaminophen  MG per tablet  Commonly known as:  PERCOCET   1-2 tablets, Oral, Every 4 Hours PRN         Changes to Medications      Instructions Start Date   pravastatin 10 MG tablet  Commonly known as:  PRAVACHOL  What changed:  when to take this   10 mg, Oral, Daily         Continue These Medications      Instructions Start Date   ACTEMRA IV   Intravenous, Every 30 Days      acyclovir 400 MG tablet  Commonly known as:  ZOVIRAX   400 mg, As Needed      cyclobenzaprine 10 MG tablet  Commonly known as:  FLEXERIL   10 mg, Oral, 3 Times Daily PRN      escitalopram 20 MG tablet  Commonly known as:  LEXAPRO   20 mg, Nightly      glimepiride 2 MG tablet  Commonly known as:  AMARYL   1 tablet/day, Oral, Every Morning      hydroCHLOROthiazide 12.5 MG capsule  Commonly known as:  MICROZIDE   12.5 mg, Oral, Every Morning      insulin detemir 100 UNIT/ML injection  Commonly known as:   LEVEMIR   Subcutaneous, 2 Times Daily PRN, PER LOW BLOOD SUGAR      leflunomide 20 MG tablet  Commonly known as:  ARAVA   20 mg, Nightly      lisinopril 5 MG tablet  Commonly known as:  PRINIVIL,ZESTRIL   5 mg, Oral, Every Morning      metFORMIN 500 MG tablet  Commonly known as:  GLUCOPHAGE   1 tablet/day, Oral, 2 Times Daily      omeprazole 40 MG capsule  Commonly known as:  priLOSEC   40 mg, Oral, Every Evening      rOPINIRole 2 MG tablet  Commonly known as:  REQUIP   2 mg, Oral, Nightly      SITagliptin 100 MG tablet  Commonly known as:  JANUVIA   100 mg, Oral, Nightly      TOPROL XL 50 MG 24 hr tablet  Generic drug:  metoprolol succinate XL   75 mg, Oral, Nightly         Stop These Medications    HIBICLENS EX     HYDROcodone-acetaminophen 5-325 MG per tablet  Commonly known as:  NORCO     mupirocin 2 % ointment  Commonly known as:  BACTROBAN                Past Medical History:   Diagnosis Date   • Chronic low back pain    • DDD (degenerative disc disease), lumbar    • Diabetes mellitus (CMS/HCC)     TYPE 2   • Dysrhythmia    • GERD (gastroesophageal reflux disease)    • Herpes     GETS FEVER BLISTERS AT TIMES   • History of echocardiogram 2019    EF 68% Grade 1 diastolic dysfucntion. Mild AR. Trace TR.    • History of hepatitis A        • History of transfusion    • Hyperlipidemia    • Hypertension    • IBS (irritable bowel syndrome)    • Lumbar canal stenosis    • Lumbar radiculopathy    • PONV (postoperative nausea and vomiting)    • Psoriasis    • Psoriatic arthritis (CMS/HCC)    • Rheumatoid arthritis (CMS/HCC)    • RLS (restless legs syndrome)    • Shoulder pain, right    • Sinus tachycardia         Past Surgical History:   Procedure Laterality Date   • ANTERIOR CERVICAL DISCECTOMY W/ FUSION N/A 2018    Procedure: C5 6 anterior cervical discectomy, fusion and instrumentation;  Surgeon: Robbie Rahman MD;  Location: [facility] MAIN OR;  Service: Neurosurgery   • APPENDECTOMY     •   SECTION      5 csection   • CHOLECYSTECTOMY  1993   • COLONOSCOPY  2012   • HYSTERECTOMY  1991   • KNEE ARTHROPLASTY Left 2008   • KNEE ARTHROPLASTY Right 2010   • TOTAL SHOULDER ARTHROPLASTY W/ DISTAL CLAVICLE EXCISION Right 3/10/2020    Procedure: RIGHT TOTAL SHOULDER REVERSE ARTHROPLASTY, ORIF OF PERIPROSTHETIC FRACTURE;  Surgeon: Festus Siu MD;  Location: [facility] OR [facility];  Service: Orthopedics;  Laterality: Right;   • WISDOM TOOTH EXTRACTION  1980'S        Social History     Occupational History   • Occupation: Cancer    Tobacco Use   • Smoking status: Never Smoker   • Smokeless tobacco: Never Used   Substance and Sexual Activity   • Alcohol use: Yes     Comment: rare   • Drug use: No   • Sexual activity: Defer      Social History     Social History Narrative   • Not on file        Family History   Problem Relation Age of Onset   • Cerebral aneurysm Mother    • Leukemia Father         Has pacemaker   • Prostate cancer Father    • Skin cancer Father    • Breast cancer Sister    • Hyperlipidemia Sister    • Hypertension Brother    • Hyperlipidemia Brother    • Irregular heart beat Brother    • Malig Hyperthermia Neg Hx          Physical Exam: 65 y.o. female  Awake and alert. C/o pain. Percocet was increased to 2 tabs every 4 hours prn pain which is more manageable.  Afeb. vss  Sling and clam shell brace in place. Dressing intact  Sensation is intact. Good pulses and capp refill  She is unable to extend her wrist, she can flex. Limited extension in her fingers. She can close her fist.  Discussed postop and rehab plans        Hospital Course:  65 y.o. female admitted to [facility] to services of No att. providers found with S/P reverse total shoulder arthroplasty, right [Z96.611] on 3/10/2020 with subsequent ORIF of the humerus.  Antibiotic and VTE prophylaxis were per SCIP protocols. Post-operatively the patient transferred to the post-operative floor where the patient underwent  mobilization therapy that included active as well as passive ROM exercises. Opioids were titrated to achieve appropriate pain management to allow for participation in mobilization exercises. Vital signs are now stable. The incision is intact without signs or symptoms of infection. Operative extremity neurovascular status remains intact.   Appropriate education re: incision care, activity levels, medications, and follow up visits was completed and all questions were answered. The patient is now deemed stable for discharge to Home.      DIAGNOSTIC TESTS:   Labs reviewed      Discharge and Follow up Instructions:   She is to remain in the sling and brace as instructed.  Discussed wound care  No therapy until instructed  She was advised to do rom exercises with her hand, wrist and fingers  Follow up in office in 14 days    DC MEDS  Percocet as needed for pain  Valium   Keflex       CECILIA Pradhan

## 2020-03-12 NOTE — OUTREACH NOTE
Prep Survey      Responses   [facility] facility patient discharged from?  [place]   Is LACE score < 7 ?  No   Eligibility  Readm Mgmt   Discharge diagnosis  reverse total shoulder arthroplasty   Does the patient have one of the following disease processes/diagnoses(primary or secondary)?  Total Joint Replacement   Does the patient have Home health ordered?  No   Is there a DME ordered?  No   Comments regarding appointments  please see AVS   Prep survey completed?  Yes          Darlene Ramos RN

## 2020-03-18 ENCOUNTER — READMISSION MANAGEMENT (OUTPATIENT)
Dept: CALL CENTER | Facility: HOSPITAL | Age: 65
End: 2020-03-18

## 2020-03-18 NOTE — OUTREACH NOTE
Total Joint Week 1 Survey      Responses   [facility] patient discharged from?  [place]   Does the patient have one of the following disease processes/diagnoses(primary or secondary)?  Total Joint Replacement   Is there a successful TCM telephone encounter documented?  No   Joint surgery performed?  Shoulder   Week 1 attempt successful?  Yes   Call start time  1115   Call end time  1118   Has the patient been back in either the hospital or Emergency Department since discharge?  No   Discharge diagnosis  reverse total shoulder arthroplasty   Does the patient have all medications related to this admission filled (includes all antibiotics, pain medications, etc.)  Yes   Is the patient taking all medications as directed (includes completed medication regime)?  Yes   Is the patient able to teach back alternate methods of pain control?  Shoulder-elevate above heart/ keep in sling as advised, Reposition, Correct alignment, Short, frequent activity, Ice   Does the patient have a follow up appointment with their surgeon?  Yes   Has the patient kept scheduled appointments due by today?  N/A   Has home health visited the patient within 72 hours of discharge?  N/A   Psychosocial issues?  No   Has the patient began therapy sessions (either in the home or as an out patient)?  No   Did the patient receive a copy of their discharge instructions?  Yes   Nursing interventions  Reviewed instructions with patient   What is the patient's perception of their functional status since discharge?  Improving   Is the patient able to teach back signs and symptoms of infection?  Shortness of breath or chest pain, Changes in mobility, Severe discomfort or pain, Increased swelling or redness around incision (not associated with surgical edema), Blisters around incision, Incisional drainage, Temp >100.4 for 24h or longer   Is the patient/caregiver able to teach back the hierarchy of who to call/visit for symptoms/problems? PCP,  Specialist, Home health nurse, Urgent Care, ED, 911  Yes   Additional teach back comments  Patient says she is doing well, she had a few questions about numbness in her hand and when she could take sling off her arm, directed her to call Surgeon and ask them those questions.   Week 1 call completed?  Yes   Revoked  No further contact(revokes)-requires comment   Graduated/Revoked comments  Patient is asking for no more calls.          Sandy Paige RN

## 2020-06-25 ENCOUNTER — TELEPHONE (OUTPATIENT)
Dept: NEUROSURGERY | Facility: CLINIC | Age: 65
End: 2020-06-25

## 2020-06-25 NOTE — TELEPHONE ENCOUNTER
PATIENT CALLED WANTED A 2ND OPINION FROM . SHE HAD SURGERY ON HER SHOULDER 3/10/20. SHOULDER REPLACEMENT SURGERY. SHE STATES SEVERE NERVE DAMAGE FROM THE SURGERY CAUSING YOU TO HAVE NO USE OF HER RIGHT HAND. SAYS IT JUST HANGS AT THE WRIST. PLEASE CALL PATIENT BACK [phone]

## 2020-06-25 NOTE — TELEPHONE ENCOUNTER
We previously seen her for her back 1/6/2020. Is something we should see? She does not have any cervical images.

## 2020-06-25 NOTE — TELEPHONE ENCOUNTER
After talking with the patient she is supposed to have a another surgery to repair her humerus and the nerves that could be damaged or entrapped. She thought Dr. Rahman was an orthopedic surgeon and could do that same surgery. I explained to her he is a Neurosurgeon, we do brain, neck and back surgery not shoulder's. She has decided to go ahead and have the surgery. I told her we are still happy to see her and evaluate her neck and symptoms. She will get the surgery and after her recovery will schedule an appointment to see Dr. Rahman for her neck.

## 2020-08-09 PROBLEM — S42.351K CLOSED DISPLACED COMMINUTED FRACTURE OF SHAFT OF RIGHT HUMERUS WITH NONUNION: Chronic | Status: ACTIVE | Noted: 2020-08-09

## 2020-08-09 NOTE — H&P
DOUGLAS LINK is a 65-year-old right-hand dominant individual who comes into the office 4 follow-up of her RIGHT arm and for a  preoperative discussion.   She has been referred to me by my partner, Dr. Robbie Siu for her RIGHT arm.  She had a reverse total shoulder and ORIF, humerus shaft fracture and a intraoperative periprosthetic fracture RIGHT humerus on March 10, 2020.  She is about 5 months out from surgery.    She continues to have a wrist drop and numbness and tingling in her hand. the numbness extends to the little finger.  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  She feels perhaps she is gained some increase in sensation in her hand.  She still has weakness in her hand.    She continues to feel movement in her humerus with motion.  She states she has no real pain.  She has been doing home exercises.    She has tried to get the dynamic cockup splint for the wrist, but was unable to procure it.  She was scheduled for her surgery in June but she had to travel to [place].  When she came back.  She thought it was not enough time for her to proceed with the surgery.  She states that she was expecting a call from the office to reschedule her surgery.  She has also been waiting to try and get a second opinion.  She has a history of rheumatoid arthritis , fibromyalgia, sleep apnea..  She denies any history of MRSA, DVT.  Review of Systems:  Positive for: Decreased Motion, Joint Pain, Poor Balance and Weakness.    Patient denies: Abdominal Pain, Bleeding, Chest Pain, Convulsions/Seizure, Depression, Difficulty Swallowing, Easy Bruisability, Emotional Disturbances, Eyes or Vision Problems, Fecal Incontinence, Fever/Chills, Headaches, Increased Thirst, Increased Hunger, Insomnia, Nausea/Vomiting, Night Sweats, Persistent Cough, Rash, Shortness of Breath, Shortness of Breath While Lying down, Skin Problems and Urinary Retention.  Allergies:  * no known  allergies  Medications:  percocet 7.5-325 mg oral tablet (oxycodone-acetaminophen) 1-2 po q 4-6 hrs prn pn; route: oral  omeprazole tablet delayed release (omeprazole tbec)   Patient History of:  RHEUMATOLOGICAL ARTHRITIS  OSTEOARTHRITIS  NEUROPATHY  MITRAL VALVE PROLAPSE  ANEMIA  SLEEP APNEA/CPAP/BIPAP  FIBROMYALGIA  GERD  BLOOD CLOTS/EMBOLISM - NEGATIVE  HEPATITIS - A  HYPERTENSION  DIABETES - TYPE 2  Surgical History:  CERVICAL SPINE-   APPENDED-   APPENDED-   APPENDED-   bilateral Knee-   bilateral APPENDED-   bilateral APPENDED-   Known Family History of:  hypertension mother/father brother  heart disease-father  stroke-mother  cancer-sibling  cancer-father  diabetes-sibling  diabetes-father  Social History:  Social history taken on 08/06/2020 states FARIBA MCCORD is a  65 year old female.  She has never used tobacco products.      Past medical, social, family histories and ROS reviewed today with the patient and changes documented in the chart (08/06/2020).  Referring CECILIA Kaufman  PCP BRYAN Khan    Physical Exam  Height:  63 in.    Weight:  162 lbs.     BMI:  28.80    Mental/HEENT/Cardio/Skin  The patient's general appearance is well nourished, well hydrated, no acute distress.  Orientation is alert and oriented x 3.  The patient's mood is normal.  Pulmonary exam shows normal air exchange, no labored breathing, or shortness of breath.  A skin exam shows normal temperature and color in the area of examination.      Right Shoulder  Incision: Mature surgical scar RIGHT shoulder and anterior arm    Decreased sensation in her hand in the ulnar nerve distribution.  She has a positive radial nerve paralysis with wrist and finger drop.  Mild + 2  contraction of the brachioradialis.  Tinel sign is POSITIVE.    She is able to do gentle active range of motion of the elbow.  However,   attempted movement of the RIGHT shoulder are painful and restricted, especially rotations. there is  positive abnormal mobility at the humerus associated with crepitation.  .  The RP pulse is 2+.        Imaging/Diagnostic Studies  X-rays of the Right Humerus [AP;Lateral] were ordered and reviewed today.    X-rays of the RIGHT humerus, 2 views obtained today in the office  nonunion right humerus periprosthetic fracture, shoulder prosthesis is well fixed , retained anterior hardware in the shaft with cables. Severe osteopenia    Impression  Aftercare following joint replacement surgery  Right artificial shoulder joint presence  Right radial and ulnar neuropathy  Right humerus nonunion  Right shoulder periprosthetic fracture around prosthesis ([id number])  Wrist drop  right    Plan  The patient and her  had several questions.  I tried to explain to the best of my ability.   The patient has a nonunion involving a RIGHT humerus periprosthetic fracture.   Radial nerve appears to be recovering partially.   The shoulder implants appeared to be  bonded well to the underlying bone.  No evidence of loosening.  Options were discussed including nonoperative management versus surgical intervention.    The patient is indicated for open repair nonunion RIGHT humerus periprosthetic fracture/revision RIGHT  shoulder arthroplasty with removal of the well fixed stem and possibly placing a longer humeral stem implant.  I do believe the best option will be to explore the previous anterior surgical scar, remove the hardware and the proceed with plating her from a posterior approach.  This will also give me ability to  explore the radial nerve on the posterior aspect.  The likely risks and benefits of the procedure including but not limited to infection, DVT, pulmonary embolism, malunion, nonunion, post traumatic stiffness, post traumatic arthritis, possibility of injury to nerves, vessels, tendons have been discussed in detail with the patient.  Despite the risks involved the patient would like to proceed.    The surgery is  scheduled for Aug 18 ,2020  at [facility]. it is likely that I might need some autograft, which we will take from her iliac crest/  allograft.  I will request for medical and cardiac clearance from her PCP CECILIA REYES.  Cardiologist is Dr. Russel Spear.     Pending surgery, she will continue physical therapy with muscle stimulator/TENS unit  portable.  Patient and her  understand that the present surgery is only to help with union of the humeral shaft.  She will possibly need future reconstruction of the wrist drop,   If there is no recovery of the radial nerve. This will have to be done by a hand surgeon.  Patient does not use tobacco.      We discussed the benefits of surgical intervention, as well as alternative treatments.  Potential surgical risks and complications include but are not limited to expected outcomes and the risk that the procedure may not accomplish the desired objective.  Sufficient opportunity was given to discuss the condition and treatment plan with the doctor, and all questions were answered for the patient.  The discussion lasted 30 minutes.  KATHY agreed to proceed with the surgery.      Kathy should follow up with GALEN ENGLISH MD post op.

## 2020-08-10 ENCOUNTER — TRANSCRIBE ORDERS (OUTPATIENT)
Dept: PREADMISSION TESTING | Facility: HOSPITAL | Age: 65
End: 2020-08-10

## 2020-08-10 DIAGNOSIS — Z01.818 OTHER SPECIFIED PRE-OPERATIVE EXAMINATION: Primary | ICD-10-CM

## 2020-08-11 ENCOUNTER — TELEPHONE (OUTPATIENT)
Dept: CARDIOLOGY | Facility: CLINIC | Age: 65
End: 2020-08-11

## 2020-08-11 ENCOUNTER — HOSPITAL ENCOUNTER (OUTPATIENT)
Dept: GENERAL RADIOLOGY | Facility: HOSPITAL | Age: 65
Discharge: HOME | End: 2020-08-11
Admitting: ORTHOPAEDIC SURGERY

## 2020-08-11 ENCOUNTER — APPOINTMENT (OUTPATIENT)
Dept: PREADMISSION TESTING | Facility: HOSPITAL | Age: 65
End: 2020-08-11

## 2020-08-11 VITALS
WEIGHT: 164 LBS | OXYGEN SATURATION: 99 % | RESPIRATION RATE: 16 BRPM | HEART RATE: 87 BPM | BODY MASS INDEX: 29.06 KG/M2 | SYSTOLIC BLOOD PRESSURE: 159 MMHG | TEMPERATURE: 98.5 F | HEIGHT: 63 IN | DIASTOLIC BLOOD PRESSURE: 89 MMHG

## 2020-08-11 LAB
ALBUMIN SERPL-MCNC: 3.9 G/DL (ref 3.5–5.2)
ALBUMIN/GLOB SERPL: 1 G/DL
ALP SERPL-CCNC: 87 U/L (ref 39–117)
ALT SERPL W P-5'-P-CCNC: 27 U/L (ref 1–33)
ANION GAP SERPL CALCULATED.3IONS-SCNC: 10.2 MMOL/L (ref 5–15)
APTT PPP: 27.5 SECONDS (ref 22.7–35.4)
AST SERPL-CCNC: 20 U/L (ref 1–32)
BACTERIA UR QL AUTO: ABNORMAL /HPF
BASOPHILS # BLD AUTO: 0.06 10*3/MM3 (ref 0–0.2)
BASOPHILS NFR BLD AUTO: 1.1 % (ref 0–1.5)
BILIRUB SERPL-MCNC: 0.6 MG/DL (ref 0–1.2)
BILIRUB UR QL STRIP: NEGATIVE
BUN SERPL-MCNC: 11 MG/DL (ref 8–23)
BUN/CREAT SERPL: 12 (ref 7–25)
CALCIUM SPEC-SCNC: 9.4 MG/DL (ref 8.6–10.5)
CHLORIDE SERPL-SCNC: 98 MMOL/L (ref 98–107)
CLARITY UR: ABNORMAL
CO2 SERPL-SCNC: 26.8 MMOL/L (ref 22–29)
COLOR UR: YELLOW
CREAT SERPL-MCNC: 0.92 MG/DL (ref 0.57–1)
DEPRECATED RDW RBC AUTO: 52.4 FL (ref 37–54)
EOSINOPHIL # BLD AUTO: 0.16 10*3/MM3 (ref 0–0.4)
EOSINOPHIL NFR BLD AUTO: 2.9 % (ref 0.3–6.2)
ERYTHROCYTE [DISTWIDTH] IN BLOOD BY AUTOMATED COUNT: 16.6 % (ref 12.3–15.4)
GFR SERPL CREATININE-BSD FRML MDRD: 61 ML/MIN/1.73
GLOBULIN UR ELPH-MCNC: 4 GM/DL
GLUCOSE SERPL-MCNC: 296 MG/DL (ref 65–99)
GLUCOSE UR STRIP-MCNC: NEGATIVE MG/DL
HCT VFR BLD AUTO: 37.5 % (ref 34–46.6)
HGB BLD-MCNC: 11.9 G/DL (ref 12–15.9)
HGB UR QL STRIP.AUTO: NEGATIVE
HYALINE CASTS UR QL AUTO: ABNORMAL /LPF
IMM GRANULOCYTES # BLD AUTO: 0.02 10*3/MM3 (ref 0–0.05)
IMM GRANULOCYTES NFR BLD AUTO: 0.4 % (ref 0–0.5)
INR PPP: 0.97 (ref 0.9–1.1)
KETONES UR QL STRIP: NEGATIVE
LEUKOCYTE ESTERASE UR QL STRIP.AUTO: ABNORMAL
LYMPHOCYTES # BLD AUTO: 1.32 10*3/MM3 (ref 0.7–3.1)
LYMPHOCYTES NFR BLD AUTO: 24 % (ref 19.6–45.3)
MCH RBC QN AUTO: 27.1 PG (ref 26.6–33)
MCHC RBC AUTO-ENTMCNC: 31.7 G/DL (ref 31.5–35.7)
MCV RBC AUTO: 85.4 FL (ref 79–97)
MONOCYTES # BLD AUTO: 0.74 10*3/MM3 (ref 0.1–0.9)
MONOCYTES NFR BLD AUTO: 13.4 % (ref 5–12)
NEUTROPHILS NFR BLD AUTO: 3.21 10*3/MM3 (ref 1.7–7)
NEUTROPHILS NFR BLD AUTO: 58.2 % (ref 42.7–76)
NITRITE UR QL STRIP: NEGATIVE
NRBC BLD AUTO-RTO: 0 /100 WBC (ref 0–0.2)
PH UR STRIP.AUTO: 5.5 [PH] (ref 5–8)
PLATELET # BLD AUTO: 245 10*3/MM3 (ref 140–450)
PMV BLD AUTO: 11 FL (ref 6–12)
POTASSIUM SERPL-SCNC: 3.7 MMOL/L (ref 3.5–5.2)
PROT SERPL-MCNC: 7.9 G/DL (ref 6–8.5)
PROT UR QL STRIP: ABNORMAL
PROTHROMBIN TIME: 12.8 SECONDS (ref 11.7–14.2)
RBC # BLD AUTO: 4.39 10*6/MM3 (ref 3.77–5.28)
RBC # UR: ABNORMAL /HPF
REF LAB TEST METHOD: ABNORMAL
SODIUM SERPL-SCNC: 135 MMOL/L (ref 136–145)
SP GR UR STRIP: 1.02 (ref 1–1.03)
SQUAMOUS #/AREA URNS HPF: ABNORMAL /HPF
UROBILINOGEN UR QL STRIP: ABNORMAL
WBC # BLD AUTO: 5.51 10*3/MM3 (ref 3.4–10.8)
WBC UR QL AUTO: ABNORMAL /HPF

## 2020-08-11 PROCEDURE — 85730 THROMBOPLASTIN TIME PARTIAL: CPT | Performed by: ORTHOPAEDIC SURGERY

## 2020-08-11 PROCEDURE — 80053 COMPREHEN METABOLIC PANEL: CPT | Performed by: ORTHOPAEDIC SURGERY

## 2020-08-11 PROCEDURE — 71046 X-RAY EXAM CHEST 2 VIEWS: CPT

## 2020-08-11 PROCEDURE — 85610 PROTHROMBIN TIME: CPT | Performed by: ORTHOPAEDIC SURGERY

## 2020-08-11 PROCEDURE — 81001 URINALYSIS AUTO W/SCOPE: CPT | Performed by: ORTHOPAEDIC SURGERY

## 2020-08-11 PROCEDURE — 36415 COLL VENOUS BLD VENIPUNCTURE: CPT

## 2020-08-11 PROCEDURE — 85025 COMPLETE CBC W/AUTO DIFF WBC: CPT | Performed by: ORTHOPAEDIC SURGERY

## 2020-08-11 RX ORDER — GABAPENTIN 300 MG/1
300 CAPSULE ORAL 3 TIMES DAILY
COMMUNITY

## 2020-08-11 NOTE — DISCHARGE INSTRUCTIONS
Take the following medications the morning of surgery:    GABAPENTIN    ARRIVE AT 5:15      If you are on prescription narcotic pain medication to control your pain you may also take that medication the morning of surgery.    General Instructions:  • Do not eat solid food after midnight the night before surgery.  • You may drink clear liquids day of surgery but must stop at least one hour before your hospital arrival time.  • It is beneficial for you to have a clear drink that contains carbohydrates the day of surgery.  We suggest a 12 to 20 ounce bottle of Gatorade or Powerade for non-diabetic patients or a 12 to 20 ounce bottle of G2 or Powerade Zero for diabetic patients. (Pediatric patients, are not advised to drink a 12 to 20 ounce carbohydrate drink)    Clear liquids are liquids you can see through.  Nothing red in color.     Plain water                               Sports drinks  Sodas                                   Gelatin (Jell-O)  Fruit juices without pulp such as white grape juice and apple juice  Popsicles that contain no fruit or yogurt  Tea or coffee (no cream or milk added)  Gatorade / Powerade  G2 / Powerade Zero    • Infants may have breast milk up to four hours before surgery.  • Infants drinking formula may drink formula up to six hours before surgery.   • Patients who avoid smoking, chewing tobacco and alcohol for 4 weeks prior to surgery have a reduced risk of post-operative complications.  Quit smoking as many days before surgery as you can.  • Do not smoke, use chewing tobacco or drink alcohol the day of surgery.   • If applicable bring your C-PAP/ BI-PAP machine.  • Bring any papers given to you in the doctor’s office.  • Wear clean comfortable clothes.  • Do not wear contact lenses, false eyelashes or make-up.  Bring a case for your glasses.   • Bring crutches or walker if applicable.  • Remove all piercings.  Leave jewelry and any other valuables at home.  • Hair extensions with metal  clips must be removed prior to surgery.  • The Pre-Admission Testing nurse will instruct you to bring medications if unable to obtain an accurate list in Pre-Admission Testing.        If you were given a blood bank ID arm band remember to bring it with you the day of surgery.    Preventing a Surgical Site Infection:  • For 2 to 3 days before surgery, avoid shaving with a razor because the razor can irritate skin and make it easier to develop an infection.    • Any areas of open skin can increase the risk of a post-operative wound infection by allowing bacteria to enter and travel throughout the body.  Notify your surgeon if you have any skin wounds / rashes even if it is not near the expected surgical site.  The area will need assessed to determine if surgery should be delayed until it is healed.  • The night prior to surgery shower using a fresh bar of anti-bacterial soap (such as Dial) and clean washcloth.  Sleep in a clean bed with clean clothing.  Do not allow pets to sleep with you.  • Shower on the morning of surgery using a fresh bar of anti-bacterial soap (such as Dial) and clean washcloth.  Dry with a clean towel and dress in clean clothing.  • Ask your surgeon if you will be receiving antibiotics prior to surgery.  • Make sure you, your family, and all healthcare providers clean their hands with soap and water or an alcohol based hand  before caring for you or your wound.    Day of surgery:  Your arrival time is approximately two hours before your scheduled surgery time.  Upon arrival, a Pre-op nurse and Anesthesiologist will review your health history, obtain vital signs, and answer questions you may have.  The only belongings needed at this time will be a list of your home medications and if applicable your C-PAP/BI-PAP machine.  If you are staying overnight your family can leave the rest of your belongings in the car and bring them to your room later.  A Pre-op nurse will start an IV and you  may receive medication in preparation for surgery, including something to help you relax.  Your family will be able to see you in the Pre-op area.  Two visitors at a time will be allowed in the Pre-op room.  While you are in surgery your family should notify the waiting room  if they leave the waiting room area and provide a contact phone number.    Please be aware that surgery does come with discomfort.  We want to make every effort to control your discomfort so please discuss any uncontrolled symptoms with your nurse.   Your doctor will most likely have prescribed pain medications.      If you are going home after surgery you will receive individualized written care instructions before being discharged.  A responsible adult must drive you to and from the hospital on the day of your surgery and stay with you for 24 hours.    If you are staying overnight following surgery, you will be transported to your hospital room following the recovery period.  [facility] has all private rooms.    If you have any questions please call Pre-Admission Testing at [phone].  Deductibles and co-payments are collected on the day of service. Please be prepared to pay the required co-pay, deductible or deposit on the day of service as defined by your plan.    Patient Education for Self-Quarantine Process    Following your COVID testing, we strongly recommend that you do not leave your home after you have been tested for COVID except to get medical care. This includes not going to work, school or to public areas.  If this is not possible for you to do please limit your activities to only required outings.  Be sure to wear a mask when you are with other people, practice social distancing and wash your hands frequently.      The following items provide additional details to keep you safe.  • Wash your hands with soap and water frequently for at least 20 seconds.   • Avoid touching your eyes, nose and mouth  with unwashed hands.  • Do not share anything - utensils, towels, food from the same bowl.   • Have your own utensils, drinking glass, dishes, towels and bedding.   • Do not have visitors.   • Do use FaceTime to stay in touch with family and friends.  • You should stay in a specific room away from others if possible.   • Stay at least 6 feet away from others in the home if you cannot have a dedicated room to yourself.   • Do not snuggle with your pet. While the CDC says there is no evidence that pets can spread COVID-19 or be infected from humans, it is probably best to avoid “petting, snuggling, being kissed or licked and sharing food (during self-quarantine)”, according to the CDC.   • Sanitize household surfaces daily. Include all high touch areas (door handles, light switches, phones, countertops, etc.)  • Do not share a bathroom with others, if possible.   • Wear a mask around others in your home if you are unable to stay in a separate room or 6 feet apart. If  you are unable to wear a mask, have your family member wear a mask if they must be within 6 feet of you.   Call your surgeon immediately if you experience any of the following symptoms:  • Sore Throat  • Shortness of Breath or difficulty breathing  • Cough  • Chills  • Body soreness or muscle pain  • Headache  • Fever  • New loss of taste or smell  • Do not arrive for your surgery ill.  Your procedure will need to be rescheduled to another time.  You will need to call your physician before the day of surgery to avoid any unnecessary exposure to hospital staff as well as other patients.

## 2020-08-12 ENCOUNTER — TELEPHONE (OUTPATIENT)
Dept: CARDIOLOGY | Facility: CLINIC | Age: 65
End: 2020-08-12

## 2020-08-15 ENCOUNTER — LAB (OUTPATIENT)
Dept: LAB | Facility: HOSPITAL | Age: 65
End: 2020-08-15

## 2020-08-15 DIAGNOSIS — Z01.818 OTHER SPECIFIED PRE-OPERATIVE EXAMINATION: ICD-10-CM

## 2020-08-15 PROCEDURE — U0004 COV-19 TEST NON-CDC HGH THRU: HCPCS

## 2020-08-15 PROCEDURE — C9803 HOPD COVID-19 SPEC COLLECT: HCPCS

## 2020-08-17 LAB
REF LAB TEST METHOD: NORMAL
SARS-COV-2 RNA RESP QL NAA+PROBE: NOT DETECTED

## 2020-08-18 ENCOUNTER — APPOINTMENT (OUTPATIENT)
Dept: GENERAL RADIOLOGY | Facility: HOSPITAL | Age: 65
End: 2020-08-18

## 2020-08-18 ENCOUNTER — HOSPITAL ENCOUNTER (OUTPATIENT)
Facility: HOSPITAL | Age: 65
Discharge: HOME | End: 2020-08-19
Attending: ORTHOPAEDIC SURGERY | Admitting: ORTHOPAEDIC SURGERY

## 2020-08-18 ENCOUNTER — ANESTHESIA (OUTPATIENT)
Dept: PERIOP | Facility: HOSPITAL | Age: 65
End: 2020-08-18

## 2020-08-18 ENCOUNTER — ANESTHESIA EVENT (OUTPATIENT)
Dept: PERIOP | Facility: HOSPITAL | Age: 65
End: 2020-08-18

## 2020-08-18 DIAGNOSIS — S42.351K CLOSED DISPLACED COMMINUTED FRACTURE OF SHAFT OF RIGHT HUMERUS WITH NONUNION: Primary | ICD-10-CM

## 2020-08-18 LAB
GLUCOSE BLDC GLUCOMTR-MCNC: 164 MG/DL (ref 70–130)
GLUCOSE BLDC GLUCOMTR-MCNC: 171 MG/DL (ref 70–130)
GLUCOSE BLDC GLUCOMTR-MCNC: 223 MG/DL (ref 70–130)
GLUCOSE BLDC GLUCOMTR-MCNC: 268 MG/DL (ref 70–130)
GLUCOSE BLDC GLUCOMTR-MCNC: 272 MG/DL (ref 70–130)

## 2020-08-18 PROCEDURE — C1713 ANCHOR/SCREW BN/BN,TIS/BN: HCPCS | Performed by: ORTHOPAEDIC SURGERY

## 2020-08-18 PROCEDURE — 25010000002 PHENYLEPHRINE PER 1 ML: Performed by: NURSE ANESTHETIST, CERTIFIED REGISTERED

## 2020-08-18 PROCEDURE — 73060 X-RAY EXAM OF HUMERUS: CPT

## 2020-08-18 PROCEDURE — 25010000002 MIDAZOLAM PER 1 MG: Performed by: ANESTHESIOLOGY

## 2020-08-18 PROCEDURE — 25010000002 ONDANSETRON PER 1 MG: Performed by: NURSE ANESTHETIST, CERTIFIED REGISTERED

## 2020-08-18 PROCEDURE — 87205 SMEAR GRAM STAIN: CPT | Performed by: ORTHOPAEDIC SURGERY

## 2020-08-18 PROCEDURE — 82962 GLUCOSE BLOOD TEST: CPT

## 2020-08-18 PROCEDURE — 25010000002 FENTANYL CITRATE (PF) 100 MCG/2ML SOLUTION: Performed by: NURSE ANESTHETIST, CERTIFIED REGISTERED

## 2020-08-18 PROCEDURE — 63710000001 INSULIN LISPRO (HUMAN) PER 5 UNITS: Performed by: ORTHOPAEDIC SURGERY

## 2020-08-18 PROCEDURE — 87070 CULTURE OTHR SPECIMN AEROBIC: CPT | Performed by: ORTHOPAEDIC SURGERY

## 2020-08-18 PROCEDURE — 25010000002 HYDROMORPHONE PER 4 MG: Performed by: NURSE ANESTHETIST, CERTIFIED REGISTERED

## 2020-08-18 PROCEDURE — 25010000002 NEOSTIGMINE PER 0.5 MG: Performed by: NURSE ANESTHETIST, CERTIFIED REGISTERED

## 2020-08-18 PROCEDURE — 76000 FLUOROSCOPY <1 HR PHYS/QHP: CPT

## 2020-08-18 PROCEDURE — 25010000003 CEFAZOLIN IN DEXTROSE 2-4 GM/100ML-% SOLUTION: Performed by: ORTHOPAEDIC SURGERY

## 2020-08-18 PROCEDURE — 25010000002 PROPOFOL 10 MG/ML EMULSION: Performed by: NURSE ANESTHETIST, CERTIFIED REGISTERED

## 2020-08-18 PROCEDURE — 87075 CULTR BACTERIA EXCEPT BLOOD: CPT | Performed by: ORTHOPAEDIC SURGERY

## 2020-08-18 PROCEDURE — 25010000002 ONDANSETRON PER 1 MG: Performed by: ORTHOPAEDIC SURGERY

## 2020-08-18 PROCEDURE — 87176 TISSUE HOMOGENIZATION CULTR: CPT | Performed by: ORTHOPAEDIC SURGERY

## 2020-08-18 DEVICE — IMPLANTABLE DEVICE: Type: IMPLANTABLE DEVICE | Site: HUMERUS | Status: FUNCTIONAL

## 2020-08-18 DEVICE — ALLOGRFT BONE VIVIGEN CELLUAR MATRX FORMABLE 1CC: Type: IMPLANTABLE DEVICE | Site: HUMERUS | Status: FUNCTIONAL

## 2020-08-18 DEVICE — CABL W CR1.7MM 750ML STRL: Type: IMPLANTABLE DEVICE | Site: HUMERUS | Status: FUNCTIONAL

## 2020-08-18 DEVICE — SCRW LK S/TAP STRDRV 3.5X26MM: Type: IMPLANTABLE DEVICE | Site: HUMERUS | Status: FUNCTIONAL

## 2020-08-18 DEVICE — SCRW LK S/TAP STRDRV 3.5X18MM: Type: IMPLANTABLE DEVICE | Site: HUMERUS | Status: FUNCTIONAL

## 2020-08-18 DEVICE — SCRW LK S/TAP STRDRV 3.5X10MM: Type: IMPLANTABLE DEVICE | Site: HUMERUS | Status: FUNCTIONAL

## 2020-08-18 DEVICE — SCRW LK S/TAP STRDRV 3.5X22MM: Type: IMPLANTABLE DEVICE | Site: HUMERUS | Status: FUNCTIONAL

## 2020-08-18 DEVICE — SCRW LK S/TAP STRDRV 3.5X24MM: Type: IMPLANTABLE DEVICE | Site: HUMERUS | Status: FUNCTIONAL

## 2020-08-18 RX ORDER — OXYCODONE AND ACETAMINOPHEN 7.5; 325 MG/1; MG/1
1 TABLET ORAL ONCE AS NEEDED
Status: DISCONTINUED | OUTPATIENT
Start: 2020-08-18 | End: 2020-08-18 | Stop reason: HOSPADM

## 2020-08-18 RX ORDER — BUPIVACAINE HYDROCHLORIDE AND EPINEPHRINE 5; 5 MG/ML; UG/ML
INJECTION, SOLUTION EPIDURAL; INTRACAUDAL; PERINEURAL
Status: DISCONTINUED | OUTPATIENT
Start: 2020-08-18 | End: 2020-08-18 | Stop reason: SURG

## 2020-08-18 RX ORDER — HYDROCODONE BITARTRATE AND ACETAMINOPHEN 7.5; 325 MG/1; MG/1
1 TABLET ORAL EVERY 4 HOURS PRN
Status: DISCONTINUED | OUTPATIENT
Start: 2020-08-18 | End: 2020-08-19

## 2020-08-18 RX ORDER — HYDROCODONE BITARTRATE AND ACETAMINOPHEN 7.5; 325 MG/1; MG/1
1 TABLET ORAL ONCE AS NEEDED
Status: DISCONTINUED | OUTPATIENT
Start: 2020-08-18 | End: 2020-08-18 | Stop reason: HOSPADM

## 2020-08-18 RX ORDER — SODIUM CHLORIDE, SODIUM LACTATE, POTASSIUM CHLORIDE, CALCIUM CHLORIDE 600; 310; 30; 20 MG/100ML; MG/100ML; MG/100ML; MG/100ML
9 INJECTION, SOLUTION INTRAVENOUS CONTINUOUS PRN
Status: DISCONTINUED | OUTPATIENT
Start: 2020-08-18 | End: 2020-08-18 | Stop reason: HOSPADM

## 2020-08-18 RX ORDER — CYCLOBENZAPRINE HCL 10 MG
10 TABLET ORAL 3 TIMES DAILY PRN
Status: DISCONTINUED | OUTPATIENT
Start: 2020-08-18 | End: 2020-08-19 | Stop reason: HOSPADM

## 2020-08-18 RX ORDER — CEFAZOLIN SODIUM 2 G/100ML
2 INJECTION, SOLUTION INTRAVENOUS ONCE
Status: COMPLETED | OUTPATIENT
Start: 2020-08-18 | End: 2020-08-18

## 2020-08-18 RX ORDER — GABAPENTIN 100 MG/1
100 CAPSULE ORAL EVERY 12 HOURS SCHEDULED
Status: DISCONTINUED | OUTPATIENT
Start: 2020-08-18 | End: 2020-08-19 | Stop reason: HOSPADM

## 2020-08-18 RX ORDER — LISINOPRIL 5 MG/1
5 TABLET ORAL
Status: DISCONTINUED | OUTPATIENT
Start: 2020-08-19 | End: 2020-08-19 | Stop reason: HOSPADM

## 2020-08-18 RX ORDER — DEXTROSE MONOHYDRATE 25 G/50ML
25 INJECTION, SOLUTION INTRAVENOUS
Status: DISCONTINUED | OUTPATIENT
Start: 2020-08-18 | End: 2020-08-19 | Stop reason: HOSPADM

## 2020-08-18 RX ORDER — FENTANYL CITRATE 50 UG/ML
INJECTION, SOLUTION INTRAMUSCULAR; INTRAVENOUS AS NEEDED
Status: DISCONTINUED | OUTPATIENT
Start: 2020-08-18 | End: 2020-08-18 | Stop reason: SURG

## 2020-08-18 RX ORDER — ROPINIROLE 2 MG/1
2 TABLET, FILM COATED ORAL NIGHTLY
Status: DISCONTINUED | OUTPATIENT
Start: 2020-08-18 | End: 2020-08-19 | Stop reason: HOSPADM

## 2020-08-18 RX ORDER — ONDANSETRON 2 MG/ML
INJECTION INTRAMUSCULAR; INTRAVENOUS AS NEEDED
Status: DISCONTINUED | OUTPATIENT
Start: 2020-08-18 | End: 2020-08-18 | Stop reason: SURG

## 2020-08-18 RX ORDER — NICOTINE POLACRILEX 4 MG
15 LOZENGE BUCCAL
Status: DISCONTINUED | OUTPATIENT
Start: 2020-08-18 | End: 2020-08-19 | Stop reason: HOSPADM

## 2020-08-18 RX ORDER — DIPHENHYDRAMINE HYDROCHLORIDE 50 MG/ML
12.5 INJECTION INTRAMUSCULAR; INTRAVENOUS
Status: DISCONTINUED | OUTPATIENT
Start: 2020-08-18 | End: 2020-08-18 | Stop reason: HOSPADM

## 2020-08-18 RX ORDER — PROPOFOL 10 MG/ML
VIAL (ML) INTRAVENOUS AS NEEDED
Status: DISCONTINUED | OUTPATIENT
Start: 2020-08-18 | End: 2020-08-18 | Stop reason: SURG

## 2020-08-18 RX ORDER — HYDROMORPHONE HCL 110MG/55ML
PATIENT CONTROLLED ANALGESIA SYRINGE INTRAVENOUS AS NEEDED
Status: DISCONTINUED | OUTPATIENT
Start: 2020-08-18 | End: 2020-08-18 | Stop reason: SURG

## 2020-08-18 RX ORDER — ONDANSETRON 2 MG/ML
4 INJECTION INTRAMUSCULAR; INTRAVENOUS ONCE AS NEEDED
Status: COMPLETED | OUTPATIENT
Start: 2020-08-18 | End: 2020-08-18

## 2020-08-18 RX ORDER — HYDROCODONE BITARTRATE AND ACETAMINOPHEN 7.5; 325 MG/1; MG/1
1-2 TABLET ORAL EVERY 4 HOURS PRN
Qty: 42 TABLET | Refills: 0 | Status: SHIPPED | OUTPATIENT
Start: 2020-08-18 | End: 2022-06-30 | Stop reason: HOSPADM

## 2020-08-18 RX ORDER — NALOXONE HCL 0.4 MG/ML
0.2 VIAL (ML) INJECTION AS NEEDED
Status: DISCONTINUED | OUTPATIENT
Start: 2020-08-18 | End: 2020-08-18 | Stop reason: HOSPADM

## 2020-08-18 RX ORDER — ONDANSETRON 2 MG/ML
4 INJECTION INTRAMUSCULAR; INTRAVENOUS EVERY 6 HOURS PRN
Status: DISCONTINUED | OUTPATIENT
Start: 2020-08-18 | End: 2020-08-19 | Stop reason: HOSPADM

## 2020-08-18 RX ORDER — MAGNESIUM HYDROXIDE 1200 MG/15ML
LIQUID ORAL AS NEEDED
Status: DISCONTINUED | OUTPATIENT
Start: 2020-08-18 | End: 2020-08-18 | Stop reason: HOSPADM

## 2020-08-18 RX ORDER — MIDAZOLAM HYDROCHLORIDE 1 MG/ML
1 INJECTION INTRAMUSCULAR; INTRAVENOUS
Status: DISCONTINUED | OUTPATIENT
Start: 2020-08-18 | End: 2020-08-18 | Stop reason: HOSPADM

## 2020-08-18 RX ORDER — HYDROMORPHONE HYDROCHLORIDE 1 MG/ML
0.5 INJECTION, SOLUTION INTRAMUSCULAR; INTRAVENOUS; SUBCUTANEOUS
Status: DISCONTINUED | OUTPATIENT
Start: 2020-08-18 | End: 2020-08-18 | Stop reason: HOSPADM

## 2020-08-18 RX ORDER — ROCURONIUM BROMIDE 10 MG/ML
INJECTION, SOLUTION INTRAVENOUS AS NEEDED
Status: DISCONTINUED | OUTPATIENT
Start: 2020-08-18 | End: 2020-08-18 | Stop reason: SURG

## 2020-08-18 RX ORDER — HYDROCHLOROTHIAZIDE 25 MG/1
12.5 TABLET ORAL DAILY
Status: DISCONTINUED | OUTPATIENT
Start: 2020-08-18 | End: 2020-08-19 | Stop reason: HOSPADM

## 2020-08-18 RX ORDER — GLIPIZIDE 5 MG/1
5 TABLET ORAL
Status: DISCONTINUED | OUTPATIENT
Start: 2020-08-19 | End: 2020-08-19 | Stop reason: HOSPADM

## 2020-08-18 RX ORDER — PROMETHAZINE HYDROCHLORIDE 25 MG/1
25 SUPPOSITORY RECTAL EVERY 8 HOURS PRN
Status: DISCONTINUED | OUTPATIENT
Start: 2020-08-18 | End: 2020-08-19 | Stop reason: HOSPADM

## 2020-08-18 RX ORDER — HYDRALAZINE HYDROCHLORIDE 20 MG/ML
5 INJECTION INTRAMUSCULAR; INTRAVENOUS
Status: DISCONTINUED | OUTPATIENT
Start: 2020-08-18 | End: 2020-08-18 | Stop reason: HOSPADM

## 2020-08-18 RX ORDER — LABETALOL HYDROCHLORIDE 5 MG/ML
5 INJECTION, SOLUTION INTRAVENOUS
Status: DISCONTINUED | OUTPATIENT
Start: 2020-08-18 | End: 2020-08-18 | Stop reason: HOSPADM

## 2020-08-18 RX ORDER — SODIUM CHLORIDE 0.9 % (FLUSH) 0.9 %
10 SYRINGE (ML) INJECTION AS NEEDED
Status: DISCONTINUED | OUTPATIENT
Start: 2020-08-18 | End: 2020-08-18 | Stop reason: HOSPADM

## 2020-08-18 RX ORDER — EPHEDRINE SULFATE 50 MG/ML
5 INJECTION, SOLUTION INTRAVENOUS ONCE AS NEEDED
Status: DISCONTINUED | OUTPATIENT
Start: 2020-08-18 | End: 2020-08-18 | Stop reason: HOSPADM

## 2020-08-18 RX ORDER — FLUMAZENIL 0.1 MG/ML
0.2 INJECTION INTRAVENOUS AS NEEDED
Status: DISCONTINUED | OUTPATIENT
Start: 2020-08-18 | End: 2020-08-18 | Stop reason: HOSPADM

## 2020-08-18 RX ORDER — SODIUM CHLORIDE 0.9 % (FLUSH) 0.9 %
10 SYRINGE (ML) INJECTION EVERY 12 HOURS SCHEDULED
Status: DISCONTINUED | OUTPATIENT
Start: 2020-08-18 | End: 2020-08-18 | Stop reason: HOSPADM

## 2020-08-18 RX ORDER — SODIUM CHLORIDE 9 MG/ML
100 INJECTION, SOLUTION INTRAVENOUS CONTINUOUS
Status: ACTIVE | OUTPATIENT
Start: 2020-08-18 | End: 2020-08-19

## 2020-08-18 RX ORDER — CEFAZOLIN SODIUM 2 G/100ML
2 INJECTION, SOLUTION INTRAVENOUS EVERY 8 HOURS
Status: DISCONTINUED | OUTPATIENT
Start: 2020-08-18 | End: 2020-08-19

## 2020-08-18 RX ORDER — HYDROCODONE BITARTRATE AND ACETAMINOPHEN 7.5; 325 MG/1; MG/1
2 TABLET ORAL EVERY 4 HOURS PRN
Status: DISCONTINUED | OUTPATIENT
Start: 2020-08-18 | End: 2020-08-19

## 2020-08-18 RX ORDER — FAMOTIDINE 10 MG/ML
20 INJECTION, SOLUTION INTRAVENOUS
Status: COMPLETED | OUTPATIENT
Start: 2020-08-18 | End: 2020-08-18

## 2020-08-18 RX ORDER — PANTOPRAZOLE SODIUM 40 MG/1
40 TABLET, DELAYED RELEASE ORAL EVERY EVENING
Status: DISCONTINUED | OUTPATIENT
Start: 2020-08-18 | End: 2020-08-19 | Stop reason: HOSPADM

## 2020-08-18 RX ORDER — DIPHENHYDRAMINE HCL 25 MG
25 CAPSULE ORAL
Status: DISCONTINUED | OUTPATIENT
Start: 2020-08-18 | End: 2020-08-18 | Stop reason: HOSPADM

## 2020-08-18 RX ORDER — GLYCOPYRROLATE 0.2 MG/ML
INJECTION INTRAMUSCULAR; INTRAVENOUS AS NEEDED
Status: DISCONTINUED | OUTPATIENT
Start: 2020-08-18 | End: 2020-08-18 | Stop reason: SURG

## 2020-08-18 RX ORDER — LIDOCAINE HYDROCHLORIDE 20 MG/ML
INJECTION, SOLUTION INFILTRATION; PERINEURAL AS NEEDED
Status: DISCONTINUED | OUTPATIENT
Start: 2020-08-18 | End: 2020-08-18 | Stop reason: SURG

## 2020-08-18 RX ORDER — PROMETHAZINE HYDROCHLORIDE 25 MG/1
25 SUPPOSITORY RECTAL EVERY 4 HOURS PRN
Status: COMPLETED | OUTPATIENT
Start: 2020-08-18 | End: 2020-08-18

## 2020-08-18 RX ORDER — FENTANYL CITRATE 50 UG/ML
50 INJECTION, SOLUTION INTRAMUSCULAR; INTRAVENOUS
Status: DISCONTINUED | OUTPATIENT
Start: 2020-08-18 | End: 2020-08-18 | Stop reason: HOSPADM

## 2020-08-18 RX ADMIN — ONDANSETRON 4 MG: 2 INJECTION INTRAMUSCULAR; INTRAVENOUS at 11:15

## 2020-08-18 RX ADMIN — METOPROLOL SUCCINATE 75 MG: 25 TABLET, EXTENDED RELEASE ORAL at 22:12

## 2020-08-18 RX ADMIN — HYDROCODONE BITARTRATE AND ACETAMINOPHEN 1 TABLET: 7.5; 325 TABLET ORAL at 16:01

## 2020-08-18 RX ADMIN — HYDROMORPHONE HYDROCHLORIDE 1 MG: 2 INJECTION, SOLUTION INTRAMUSCULAR; INTRAVENOUS; SUBCUTANEOUS at 07:54

## 2020-08-18 RX ADMIN — MIDAZOLAM 1 MG: 1 INJECTION INTRAMUSCULAR; INTRAVENOUS at 06:35

## 2020-08-18 RX ADMIN — LABETALOL HYDROCHLORIDE 5 MG: 5 INJECTION, SOLUTION INTRAVENOUS at 10:41

## 2020-08-18 RX ADMIN — ONDANSETRON 4 MG: 2 INJECTION INTRAMUSCULAR; INTRAVENOUS at 16:01

## 2020-08-18 RX ADMIN — BUPIVACAINE HYDROCHLORIDE AND EPINEPHRINE BITARTRATE 20 ML: 5; .0091 INJECTION, SOLUTION EPIDURAL; INTRACAUDAL; PERINEURAL at 10:55

## 2020-08-18 RX ADMIN — FENTANYL CITRATE 100 MCG: 50 INJECTION INTRAMUSCULAR; INTRAVENOUS at 07:07

## 2020-08-18 RX ADMIN — INSULIN LISPRO 4 UNITS: 100 INJECTION, SOLUTION INTRAVENOUS; SUBCUTANEOUS at 18:02

## 2020-08-18 RX ADMIN — HYDROMORPHONE HYDROCHLORIDE 0.5 MG: 1 INJECTION, SOLUTION INTRAMUSCULAR; INTRAVENOUS; SUBCUTANEOUS at 10:50

## 2020-08-18 RX ADMIN — PHENYLEPHRINE HYDROCHLORIDE 100 MCG: 10 INJECTION INTRAVENOUS at 07:40

## 2020-08-18 RX ADMIN — PROPOFOL 130 MG: 10 INJECTION, EMULSION INTRAVENOUS at 07:10

## 2020-08-18 RX ADMIN — SODIUM CHLORIDE, POTASSIUM CHLORIDE, SODIUM LACTATE AND CALCIUM CHLORIDE: 600; 310; 30; 20 INJECTION, SOLUTION INTRAVENOUS at 09:14

## 2020-08-18 RX ADMIN — FENTANYL CITRATE 50 MCG: 50 INJECTION, SOLUTION INTRAMUSCULAR; INTRAVENOUS at 10:36

## 2020-08-18 RX ADMIN — CEFAZOLIN SODIUM 2 G: 2 INJECTION, SOLUTION INTRAVENOUS at 07:17

## 2020-08-18 RX ADMIN — NEOSTIGMINE METHYLSULFATE 2.5 MG: 1 INJECTION INTRAMUSCULAR; INTRAVENOUS; SUBCUTANEOUS at 09:23

## 2020-08-18 RX ADMIN — GABAPENTIN 100 MG: 100 CAPSULE ORAL at 22:12

## 2020-08-18 RX ADMIN — GLYCOPYRROLATE 0.4 MG: 0.2 INJECTION INTRAMUSCULAR; INTRAVENOUS at 09:23

## 2020-08-18 RX ADMIN — ONDANSETRON 4 MG: 2 INJECTION INTRAMUSCULAR; INTRAVENOUS at 22:12

## 2020-08-18 RX ADMIN — FENTANYL CITRATE 50 MCG: 50 INJECTION, SOLUTION INTRAMUSCULAR; INTRAVENOUS at 10:58

## 2020-08-18 RX ADMIN — LIDOCAINE HYDROCHLORIDE 100 MG: 20 INJECTION, SOLUTION INFILTRATION; PERINEURAL at 07:07

## 2020-08-18 RX ADMIN — FAMOTIDINE 20 MG: 10 INJECTION INTRAVENOUS at 06:35

## 2020-08-18 RX ADMIN — ONDANSETRON HYDROCHLORIDE 4 MG: 2 SOLUTION INTRAMUSCULAR; INTRAVENOUS at 09:07

## 2020-08-18 RX ADMIN — ROPINIROLE 2 MG: 2 TABLET, FILM COATED ORAL at 22:12

## 2020-08-18 RX ADMIN — ROCURONIUM BROMIDE 40 MG: 10 INJECTION INTRAVENOUS at 07:10

## 2020-08-18 RX ADMIN — HYDROCODONE BITARTRATE AND ACETAMINOPHEN 1 TABLET: 7.5; 325 TABLET ORAL at 22:12

## 2020-08-18 RX ADMIN — CEFAZOLIN SODIUM 2 G: 2 INJECTION, SOLUTION INTRAVENOUS at 18:02

## 2020-08-18 RX ADMIN — SODIUM CHLORIDE, POTASSIUM CHLORIDE, SODIUM LACTATE AND CALCIUM CHLORIDE 9 ML/HR: 600; 310; 30; 20 INJECTION, SOLUTION INTRAVENOUS at 06:35

## 2020-08-18 RX ADMIN — PANTOPRAZOLE SODIUM 40 MG: 40 TABLET, DELAYED RELEASE ORAL at 18:02

## 2020-08-18 RX ADMIN — PROMETHAZINE HYDROCHLORIDE 25 MG: 25 SUPPOSITORY RECTAL at 12:36

## 2020-08-18 RX ADMIN — HYDROMORPHONE HYDROCHLORIDE 1 MG: 2 INJECTION, SOLUTION INTRAMUSCULAR; INTRAVENOUS; SUBCUTANEOUS at 08:23

## 2020-08-18 NOTE — NURSING NOTE
1215: Notified Dr Infante of BS results. No additional orders given. Notified of nausea. Order for rectal Phenergan given. Dr Infante confirmed block should last until tomorrow.

## 2020-08-18 NOTE — OP NOTE
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 EXPLORATION RADIAL NERVE  3. Removal of deep hardware through separate incision    Implant Name Type Inv. Item Serial No.  Lot No. LRB No. Used   CABL W CR1.7MM 750ML STRL - [id number] Implant CABL W CR1.7MM 750ML STRL  DEPU[employer]ES [id number] Right 1   CABL W CR1.7MM 750ML STRL - [id number] Implant CABL W CR1.7MM 750ML STRL  DEPUY SYNTHES [id number] Right 1   ALLOGRFT BONE VIVIGEN CELLUAR MATRX FORMABLE 1CC - [id number] - [id number] Implant ALLOGRFT BONE VIVIGEN CELLUAR MATRX FORMABLE 1CC [id number] [employer] . Right 1   PLT HUM LCP XART DIST 10HL 3.5X230 RT - [id number] Implant PLT HUM LCP XART DIST 10HL 3.5X230 RT  DEPUY SYNTHES . Right 1   SCRW LK S/TAP STRDRV 3.5X24MM - [id number] Implant SCRW LK S/TAP STRDRV 3.5X24MM  DEPUY SYNTHES . Right 1   SCRW LK S/TAP STRDRV 3.5X10MM - [id number] Implant SCRW LK S/TAP STRDRV 3.5X10MM  DEPUY SYNTHES . Right 4   SCRW LK S/TAP STRDRV 3.5X22MM - [id number] Implant SCRW LK S/TAP STRDRV 3.5X22MM  DEPUY SYNTHES . Right 1   SCRW LK S/TAP STRDRV 3.5X26MM - [id number] Implant SCRW LK S/TAP STRDRV 3.5X26MM  DEPUY SYNTHES . Right 2   SCRW LK S/TAP STRDRV 3.5X18MM - [id number] Implant SCRW LK S/TAP STRDRV 3.5X18MM  DEPUY [employer] . Right 1   SCRW LK S/TAP STRDRV 3.5X24MM - [id number] Implant SCRW  LK S/TAP STRDRV 3.5X24MM  DEPUY SYNTHES . Right 2       SURGEON: Betito Delaney MD     ASSISTANT: Guanaco Wang DO Fellow     KENDALL ZhengA     The services of a skilled  first assist were necessary for performing the procedure safely and expeditiously.  The first assist was present for the entire duration of the case and helped with positioning, retraction and closure of the incision.      ANESTHESIA: General  Anesthesiologist: Flaquito Infante MD  CRNA: Conrado Hernandes CRNA     Staff:   Circulator: Michael Manjarrez RN; Silvia Rodarte RN  Radiology Technologist: Selina Urban; Itzel Graves  Scrub Person: Vahid Mario  Vendor Representative: Raymundo Rdz  Assistant: Shane Carranza CSA  Orientee: Sabine Haley RN    Estimated Blood Loss: 100 mL    Specimens:   Order Name Source Comment Collection Info Order Time   ANAEROBIC CULTURE Arm, Right  Collected By: Betito Delaney MD 8/18/2020  8:43 AM       COMPLICATIONS: Nil.     DRAINS: No LDAs found *.     INDICATIONS: The patient is a 65 y.o. female  Who has a history of reverse RIGHT shoulder replacement with Dr. Darío Siu and had Intra-Op operative humerus shaft fracture . She was managed with open reduction internal fixation at the time of the index digit.  Unfortunately, she progressed with nonunion.  The patient also had a RIGHT wrist drop, Radial nerve paralysis associated with ulnar neuropathy.      Patient was referred to me for further evaluation and management.  She has a history of rheumatoid arthritis. Treatment options and alternatives were discussed in detail with the patient who is indicated for a open repair of the nonunion . Likely risks and benefits of the procedure, including but not limited to   infection, DVT, pulmonary embolism, malunion, nonunion, possibility of injury to tendons, ligaments, nerves or vessels and risk for mortality and morbidity have been discussed in detail. Despite the risks  involved, the patient elected to proceed and informed consent was obtained and the patient was scheduled for surgery. The patient was seen in the preoperative holding area and the operative site was marked.     DESCRIPTION OF PROCEDURE:   The patient was transferred to [facility] operating room. Preoperative antibiotics in the form of Kefzol  intravenously was infused prior to the incision and prior to the tourniquet placement according to the SCIP protocol. A surgical time out was done with the team and the correct patient, surgical side and site were identified.     After achieving adequate general anesthesia, the patient was placed in lateral position utilizing a beanbag.  All bony prominences were padded well. The operative extremity was prepped and draped in the usual sterile fashion.     A skin incision was made incorporating the previous surgical scar for an anterolateral approach to the humerus.  Surgical incision was extended through the skin and subcutaneous tissue, followed by deep dissection along the surgical scar to reach the anterolateral aspect of the humerus.  The plate was identified.  There were 3 cables and 2 screws.  Those were removed and the plate was removed without difficulty.  The nonunion was clearly evident secondary to the abnormal mobility.  The screws were loose in the bone.  The fracture fragments were found her to be white and sclerotic in the anterior humerus.  I have sent tissue from the humerus for culture sensitivity.    Attention was then directed to the posterior shaft humerus.  I made a posterior approach and the skin incision was made well proximal in the arm posteriorly all the way up to the lateral aspect of the olecranon.  Skin and subcutaneous tenderness tissue were incised and the deep fascia was incised in line with the skin incision.  In the proximal portion of the wound,   The long and lateral heads of the triceps muscle was identified. The interval  between these 2 was developed and the posterior aspect of the humeral shaft was identified.  This was exposed.  The triceps tendon was divided and the distal portion of the wound and the deep head of the triceps muscle was divided free to the bone.  I dissected more towards the lateral column.  The fracture site was identified.  The nonunion was identified.  At this stage, I carefully dissected the radial nerve.  It was found to be flattened in the middle third of the arm in the radial groove.  It was engulfed in  some scar tissue. There was no obvious transection of the nerve.  I was able to mobilize and do some neurolysis of the nerve, but I avoided dissecting the nerve.  Most of the soft tissue was LEFT with the nerve.  I was able to release the scar tissue from the posterior aspect of the humerus shaft and the nerve with gentle blunt dissection.    At this stage, I elected to place a long bridge plate to stabilize the humeral shaft.  Extra-articular lateral column humeral plate anatomically contoured, was selected and this was passed anterior to the radial nerve and secured to the shaft proximally and the lateral condyle distally.  Distally, I placed several locking screws.  Proximally, I placed  locking screws that were unicortical. I also placed 2 circumferential cables.  These were tensioned, crimped and cut.    The overall reduction of the fracture fixation of the fracture and position of the  Hardware was found to be satisfactory and stable. I used Vivigen , allograft that was placed at the nonunion site posteriorly.     The incision was thoroughly irrigated with saline and closed in layers.  Sterile dressings were placed. The patient tolerated the procedure well.  There were no complications.  The patient had good distal pulses and adequate capillary refill.    She is being admitted for pain control. The patient will receive Kefzol intravenously every 8 for 2 more doses according to ski protocol.  She  will remain nonweightbearing.    I discussed the satisfactory performance of the procedure with the patient's family and discussed with them The postoperative management.     CPT CODE: 1. 02536 2. 74551 3.  92032     Betito Delaney M.D.    8/18/2020    CC: Lucy Turcios APRN; MD Rhett Mckeon, Betito RENDON,*

## 2020-08-18 NOTE — PLAN OF CARE
Problem: Patient Care Overview  Goal: Plan of Care Review  Outcome: Ongoing (interventions implemented as appropriate)  Flowsheets (Taken 8/18/2020 3081)  Progress: improving  Plan of Care Reviewed With: patient  Outcome Summary: Patient admitted from PACU in stable condition after R humerus repair. VSS and voiding function is intact. Interscalene block still in effect and pain also managed with po meds. Patient with c/o of WILLI, no vomiting since arrival to unit, and patient states it is improving. IV zofran and rectal phenergan available, x1 dose zofran since arrival to unit. Patient is ambulating with x1 assist, ambulating steadily but is impulsive. Patient anticipates d/c home tomorrow if stable.

## 2020-08-18 NOTE — ANESTHESIA POSTPROCEDURE EVALUATION
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        Post Anesthesia Care and Evaluation    Pain management: adequate  Airway patency: patent  Anesthetic complications: No anesthetic complications    Cardiovascular status: acceptable  Respiratory status: acceptable  Hydration status: acceptable    Comments: /66   Pulse 76   Temp 37.1 °C (98.7 °F) (Oral)   Resp 16   Ht 160 cm (63\")   Wt 75 kg (165 lb 4.8 oz)   SpO2 96%   BMI 29.28 kg/m²         "

## 2020-08-18 NOTE — ANESTHESIA PROCEDURE NOTES
Peripheral Block      Patient location during procedure: post-op  Start time: 8/18/2020 10:50 AM  Stop time: 8/18/2020 10:56 AM  Reason for block: at surgeon's request and post-op pain management  Performed by  Anesthesiologist: Flaquito Infante MD  Preanesthetic Checklist  Completed: patient identified, site marked, surgical consent, pre-op evaluation, timeout performed, IV checked, risks and benefits discussed and monitors and equipment checked  Prep:  Pt Position: supine  Sterile barriers:cap, gloves and mask  Prep: ChloraPrep  Patient monitoring: blood pressure monitoring, continuous pulse oximetry and EKG  Procedure  Sedation:yes    Guidance:ultrasound guided  ULTRASOUND INTERPRETATION.  Using ultrasound guidance a 21 G gauge needle was placed in close proximity to the brachial plexus nerve, at which point, under ultrasound guidance anesthetic was injected in the area of the nerve and spread of the anesthesia was seen on ultrasound in close proximity thereto.  There were no abnormalities seen on ultrasound; a digital image was taken; and the patient tolerated the procedure with no complications. Images:still images obtained, printed/placed on chart    Laterality:right  Block Type:interscalene  Injection Technique:single-shot    Medications Used: bupivacaine-EPINEPHrine PF (MARCAINE w/EPI) 0.5% -1:[id number] injection, 20 mL  Med admintered at 8/18/2020 10:55 AM      Post Assessment  Injection Assessment: negative aspiration for heme  Patient Tolerance:comfortable throughout block  Complications:no

## 2020-08-18 NOTE — BRIEF OP NOTE
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, subsequent encounter [S42.331K]     * Right radial nerve palsy [G56.31]    Procedure/CPT® Codes:        Procedure(s):  REPAIR NONUNION RIGHT HUMERUS SHAFT FRACTURE, EXPLORATION RADIAL NERVE    Surgeon(s):  Betito Delaney MD Presley, Terry L, DO    Anesthesia: General    Staff:   Circulator: Michael Manjarrez, RN; Silvia Rodarte RN  Radiology Technologist: Selina Urban; Itzel Graves  Scrub Person: Vahid Mario  Vendor Representative: Raymundo Rdz  Assistant: Shane Carranza CSA  Orientee: Sabine Haley RN  Assistant: Shane Carranza CSA      Estimated Blood Loss: 100 mL    Urine Voided: * No values recorded between 8/18/2020  7:02 AM and 8/18/2020  9:31 AM *    Specimens:                Specimens     ID Source Type Tests Collected By Collected At Frozen?      1 Arm, Right Tissue · ANAEROBIC CULTURE  · AFB CULTURE (Canceled)   Betito Delaney MD 8/18/20 0836      Description: Right Humerus Tissue culture for nonunion right humerus fracture                Drains: * No LDAs found *    Findings: see dict     Complications: nil    Assistant: Shane Carranza CSA  was responsible for performing the following activities: Retraction, Suction, Irrigation, Suturing, Closing and Placing Dressing and their skilled assistance was necessary for the success of this case.    Betito Delaney MD     Date: 8/18/2020  Time: 09:31

## 2020-08-18 NOTE — ANESTHESIA PREPROCEDURE EVALUATION
Anesthesia Evaluation     Patient summary reviewed   history of anesthetic complications: PONV               Airway   Mallampati: II  No difficulty expected  Dental      Pulmonary    Cardiovascular     ECG reviewed  Rhythm: regular    (+) hypertension,       Neuro/Psych  GI/Hepatic/Renal/Endo    (+)   diabetes mellitus,     Musculoskeletal     Abdominal    Substance History      OB/GYN          Other   arthritis (RA),                      Anesthesia Plan    ASA 3     general       Anesthetic plan, all risks, benefits, and alternatives have been provided, discussed and informed consent has been obtained with: patient.  Use of blood products discussed with patient .

## 2020-08-18 NOTE — ANESTHESIA PROCEDURE NOTES
Airway  Urgency: elective    Date/Time: 8/18/2020 7:15 AM  Airway not difficult    General Information and Staff    Patient location during procedure: OR  Anesthesiologist: Flaquito Infante MD  CRNA: Conrado Hernandes CRNA    Indications and Patient Condition  Indications for airway management: airway protection    Preoxygenated: yes  Mask difficulty assessment: 1 - vent by mask    Final Airway Details  Final airway type: endotracheal airway      Successful airway: ETT  Cuffed: yes   Successful intubation technique: direct laryngoscopy  Facilitating devices/methods: anterior pressure/BURP  Endotracheal tube insertion site: oral  Blade: Alvarez  Blade size: 2  ETT size (mm): 7.0  Cormack-Lehane Classification: grade I - full view of glottis  Placement verified by: chest auscultation and capnometry   Measured from: lips  ETT/EBT  to lips (cm): 22  Number of attempts at approach: 1  Assessment: lips, teeth, and gum same as pre-op and atraumatic intubation    Additional Comments  Pre 02 100%, SIVI, DL x1, atraumatic intubation, BLBS, Positive ETC02.

## 2020-08-19 VITALS
RESPIRATION RATE: 16 BRPM | SYSTOLIC BLOOD PRESSURE: 110 MMHG | OXYGEN SATURATION: 98 % | HEIGHT: 63 IN | TEMPERATURE: 98.2 F | BODY MASS INDEX: 29.29 KG/M2 | HEART RATE: 74 BPM | DIASTOLIC BLOOD PRESSURE: 68 MMHG | WEIGHT: 165.3 LBS

## 2020-08-19 LAB
ANION GAP SERPL CALCULATED.3IONS-SCNC: 8.2 MMOL/L (ref 5–15)
BASOPHILS # BLD AUTO: 0.05 10*3/MM3 (ref 0–0.2)
BASOPHILS NFR BLD AUTO: 0.6 % (ref 0–1.5)
BUN SERPL-MCNC: 11 MG/DL (ref 8–23)
BUN/CREAT SERPL: 12.4 (ref 7–25)
CALCIUM SPEC-SCNC: 8.1 MG/DL (ref 8.6–10.5)
CHLORIDE SERPL-SCNC: 100 MMOL/L (ref 98–107)
CO2 SERPL-SCNC: 23.8 MMOL/L (ref 22–29)
CREAT SERPL-MCNC: 0.89 MG/DL (ref 0.57–1)
DEPRECATED RDW RBC AUTO: 50.7 FL (ref 37–54)
EOSINOPHIL # BLD AUTO: 0.12 10*3/MM3 (ref 0–0.4)
EOSINOPHIL NFR BLD AUTO: 1.4 % (ref 0.3–6.2)
ERYTHROCYTE [DISTWIDTH] IN BLOOD BY AUTOMATED COUNT: 16.3 % (ref 12.3–15.4)
GFR SERPL CREATININE-BSD FRML MDRD: 63 ML/MIN/1.73
GLUCOSE BLDC GLUCOMTR-MCNC: 153 MG/DL (ref 70–130)
GLUCOSE BLDC GLUCOMTR-MCNC: 165 MG/DL (ref 70–130)
GLUCOSE BLDC GLUCOMTR-MCNC: 188 MG/DL (ref 70–130)
GLUCOSE SERPL-MCNC: 222 MG/DL (ref 65–99)
HCT VFR BLD AUTO: 27.3 % (ref 34–46.6)
HGB BLD-MCNC: 8.9 G/DL (ref 12–15.9)
IMM GRANULOCYTES # BLD AUTO: 0.03 10*3/MM3 (ref 0–0.05)
IMM GRANULOCYTES NFR BLD AUTO: 0.4 % (ref 0–0.5)
LYMPHOCYTES # BLD AUTO: 1.49 10*3/MM3 (ref 0.7–3.1)
LYMPHOCYTES NFR BLD AUTO: 17.5 % (ref 19.6–45.3)
MCH RBC QN AUTO: 27.6 PG (ref 26.6–33)
MCHC RBC AUTO-ENTMCNC: 32.6 G/DL (ref 31.5–35.7)
MCV RBC AUTO: 84.8 FL (ref 79–97)
MONOCYTES # BLD AUTO: 1.31 10*3/MM3 (ref 0.1–0.9)
MONOCYTES NFR BLD AUTO: 15.4 % (ref 5–12)
NEUTROPHILS NFR BLD AUTO: 5.52 10*3/MM3 (ref 1.7–7)
NEUTROPHILS NFR BLD AUTO: 64.7 % (ref 42.7–76)
NRBC BLD AUTO-RTO: 0 /100 WBC (ref 0–0.2)
PLATELET # BLD AUTO: 230 10*3/MM3 (ref 140–450)
PMV BLD AUTO: 11.6 FL (ref 6–12)
POTASSIUM SERPL-SCNC: 3.6 MMOL/L (ref 3.5–5.2)
RBC # BLD AUTO: 3.22 10*6/MM3 (ref 3.77–5.28)
SODIUM SERPL-SCNC: 132 MMOL/L (ref 136–145)
WBC # BLD AUTO: 8.52 10*3/MM3 (ref 3.4–10.8)

## 2020-08-19 PROCEDURE — 63710000001 INSULIN LISPRO (HUMAN) PER 5 UNITS: Performed by: ORTHOPAEDIC SURGERY

## 2020-08-19 PROCEDURE — 85025 COMPLETE CBC W/AUTO DIFF WBC: CPT | Performed by: NURSE PRACTITIONER

## 2020-08-19 PROCEDURE — 25010000002 ONDANSETRON PER 1 MG: Performed by: ORTHOPAEDIC SURGERY

## 2020-08-19 PROCEDURE — 82962 GLUCOSE BLOOD TEST: CPT

## 2020-08-19 PROCEDURE — 63710000001 PROMETHAZINE PER 12.5 MG: Performed by: NURSE PRACTITIONER

## 2020-08-19 PROCEDURE — 80048 BASIC METABOLIC PNL TOTAL CA: CPT | Performed by: NURSE PRACTITIONER

## 2020-08-19 PROCEDURE — 25010000003 CEFAZOLIN IN DEXTROSE 2-4 GM/100ML-% SOLUTION: Performed by: ORTHOPAEDIC SURGERY

## 2020-08-19 RX ORDER — ONDANSETRON 4 MG/1
4 TABLET, FILM COATED ORAL EVERY 8 HOURS PRN
Qty: 30 TABLET | Refills: 0 | Status: SHIPPED | OUTPATIENT
Start: 2020-08-19

## 2020-08-19 RX ORDER — PROMETHAZINE HYDROCHLORIDE 12.5 MG/1
12.5 TABLET ORAL EVERY 6 HOURS PRN
Status: DISCONTINUED | OUTPATIENT
Start: 2020-08-19 | End: 2020-08-19 | Stop reason: HOSPADM

## 2020-08-19 RX ORDER — PROMETHAZINE HYDROCHLORIDE 12.5 MG/1
12.5 TABLET ORAL EVERY 6 HOURS PRN
Qty: 30 TABLET | Refills: 1 | Status: SHIPPED | OUTPATIENT
Start: 2020-08-19

## 2020-08-19 RX ORDER — OXYCODONE HYDROCHLORIDE AND ACETAMINOPHEN 5; 325 MG/1; MG/1
1 TABLET ORAL EVERY 4 HOURS PRN
Status: DISCONTINUED | OUTPATIENT
Start: 2020-08-19 | End: 2020-08-19 | Stop reason: HOSPADM

## 2020-08-19 RX ADMIN — HYDROCODONE BITARTRATE AND ACETAMINOPHEN 1 TABLET: 7.5; 325 TABLET ORAL at 06:09

## 2020-08-19 RX ADMIN — PROMETHAZINE HYDROCHLORIDE 12.5 MG: 12.5 TABLET ORAL at 10:30

## 2020-08-19 RX ADMIN — GABAPENTIN 100 MG: 100 CAPSULE ORAL at 08:47

## 2020-08-19 RX ADMIN — OXYCODONE AND ACETAMINOPHEN 1 TABLET: 5; 325 TABLET ORAL at 10:30

## 2020-08-19 RX ADMIN — PANTOPRAZOLE SODIUM 40 MG: 40 TABLET, DELAYED RELEASE ORAL at 17:37

## 2020-08-19 RX ADMIN — INSULIN LISPRO 2 UNITS: 100 INJECTION, SOLUTION INTRAVENOUS; SUBCUTANEOUS at 12:28

## 2020-08-19 RX ADMIN — METFORMIN HYDROCHLORIDE 500 MG: 500 TABLET ORAL at 17:37

## 2020-08-19 RX ADMIN — HYDROCODONE BITARTRATE AND ACETAMINOPHEN 1 TABLET: 7.5; 325 TABLET ORAL at 02:03

## 2020-08-19 RX ADMIN — ONDANSETRON 4 MG: 2 INJECTION INTRAMUSCULAR; INTRAVENOUS at 06:09

## 2020-08-19 RX ADMIN — ONDANSETRON 4 MG: 2 INJECTION INTRAMUSCULAR; INTRAVENOUS at 15:22

## 2020-08-19 RX ADMIN — INSULIN LISPRO 2 UNITS: 100 INJECTION, SOLUTION INTRAVENOUS; SUBCUTANEOUS at 08:47

## 2020-08-19 RX ADMIN — CEFAZOLIN SODIUM 2 G: 2 INJECTION, SOLUTION INTRAVENOUS at 01:09

## 2020-08-19 RX ADMIN — METFORMIN HYDROCHLORIDE 500 MG: 500 TABLET ORAL at 08:46

## 2020-08-19 RX ADMIN — INSULIN LISPRO 2 UNITS: 100 INJECTION, SOLUTION INTRAVENOUS; SUBCUTANEOUS at 17:37

## 2020-08-19 RX ADMIN — GLIPIZIDE 5 MG: 5 TABLET ORAL at 08:46

## 2020-08-19 RX ADMIN — OXYCODONE AND ACETAMINOPHEN 1 TABLET: 5; 325 TABLET ORAL at 15:20

## 2020-08-19 NOTE — PLAN OF CARE
Problem: Patient Care Overview  Goal: Plan of Care Review  Outcome: Ongoing (interventions implemented as appropriate)  Flowsheets (Taken 8/19/2020 0153)  Progress: improving  Plan of Care Reviewed With: patient  Outcome Summary: POD 0 Rt humerus fx repair with radial nerve exploration. VSS, pain well controlled with prn norco, PONV alleviated with prn zofran. up with assist x1 to BR, tolerated well. RUE  in sling, surgical block still intact - able to move index and thumb fingers. plans to d/c home with HH 8/19. discussed blood glucose monitoring r/t hx DMII.  Goal: Individualization and Mutuality  Outcome: Ongoing (interventions implemented as appropriate)  Goal: Discharge Needs Assessment  Outcome: Ongoing (interventions implemented as appropriate)  Goal: Interprofessional Rounds/Family Conf  Outcome: Ongoing (interventions implemented as appropriate)     Problem: Pain, Chronic (Adult)  Goal: Acceptable Pain/Comfort Level and Functional Ability  Outcome: Ongoing (interventions implemented as appropriate)  Flowsheets (Taken 8/19/2020 0153)  Acceptable Pain/Comfort Level and Functional Ability: making progress toward outcome     Problem: Fall Risk (Adult)  Goal: Absence of Fall  Outcome: Ongoing (interventions implemented as appropriate)  Flowsheets (Taken 8/19/2020 0153)  Absence of Fall: achieves outcome     Problem: Surgery Nonspecified (Adult)  Goal: Signs and Symptoms of Listed Potential Problems Will be Absent, Minimized or Managed (Surgery Nonspecified)  Outcome: Ongoing (interventions implemented as appropriate)  Flowsheets (Taken 8/19/2020 0153)  Problems Assessed (Surgery): all  Problems Present (Surgery): pain; postoperative nausea and vomiting  Goal: Anesthesia/Sedation Recovery  Outcome: Ongoing (interventions implemented as appropriate)  Flowsheets (Taken 8/19/2020 0153)  Anesthesia/Sedation Recovery: progressing toward baseline

## 2020-08-19 NOTE — DISCHARGE INSTRUCTIONS
Discharge and Follow up Instructions:      I. ACTIVITIES:  1. Exercises:  · Complete exercise program as taught by the hospital physical therapist 2 times per day  · Wear sling when in bed and when out of bed (except when doing exercises)  · Exercise program will be advanced by the physical therapist  · During the day be up ambulating every 2 hours (while awake) for short distances  · Complete the ankle pump exercises at least 10 times per hour (while awake)  · Elevate operative arm when in bed and for at least 30 minutes during the day.   · Use cold packs 20-30 minutes approximately 5 times per day. This should be done before and after completing your exercises and at any time you are experiencing pain/ stiffness in your operative extremity.     2. Activities of Daily Living:  · No tub baths, hot tubs, or swimming pools for 4 weeks  · May shower and let water run over the incision on post-operative day #5 if no drainage. Do not scrub or rub the incision. Simply let the water run over the incision and pat dry.     II. Restrictions  · No pushing, pulling, lifting, or weight bearing on operative extremity.  · Continue to follow shoulder precautions as instructed by hospital physical therapist  · Your surgeon will discuss with you when you will be able to drive again. Usual guidelines are you are to be off pain medications prior to driving.  · First week stay inside on even terrain. May go up and down stairs one stair at a time utilizing the hand rail once cleared by physical therapy to do so.  · After one week, you may venture outside (if cleared to do so by physical therapist).     III. Precautions:  · Everyone that comes near you should wash their hands  · No elective dental, genital-urinary, or colon procedures or surgical procedures for 12 weeks after surgery unless absolutely necessary.  ·  If dental work or surgical procedure is deemed absolutely necessary, you will need to contact your surgeon as you will need  to take antibiotics 1 hour prior to any dental work (including teeth cleanings).  · Please discuss with your surgeon prophylactic antibiotics as the length of time this intervention will be necessary for you varies with each patient’s health history and situation.  · Avoid sick people. If you must be around someone who is ill, they should wear a mask.  · Avoid visits to the Emergency Room or Urgent Care. If you feel you need to go to the Emergency Room, please notify your Surgeon's office.  · Stockings are to be worn for one week after surgery and are to be placed on in the morning and removed at night. Observe your skin when stocking is removed for any problems. Monitor the stockings to ensure that any swelling is not causing the stockings to become too tight. In this case, remove stockings immediately.        IV. INCISION CARE:  · Wash your hands prior to dressing changes  · Change the dressing as needed to keep incision clean and dry. Utilize dry gauze and paper tape. Avoid touching the side of the gauze that goes against the incision with your hands. Leave current dressing in place if it remains clean, dry and intact until your follow up appointment.   · No creams or ointments to the incision  · Do not touch or pick at the incision  · Check incision every day and notify surgeon immediately if any of the following signs or symptoms are noted:  ? Increase in redness  ? Increase in swelling around the incision and of the entire extremity  ? Increase in pain  ? Drainage oozing from the incision  ? Pulling apart of the edges of the incision  ? Increase in overall body temperature (greater than 100.5 degrees)  · Your Staples will be removed between 10-14 days postoperation.  This may be done by either the home health nurse, rehabilitation nurse or during your return visit to Dr. Delaney's office.  You will then be instructed on how to care for the incision.  (Please call the office if your staples have not been  removed within 14 days after surgery).     V. Medications:      1. Stool Softeners: You will be at greater risk of constipation after surgery due to being less mobile and the pain medications.   · Take stool softeners as instructed by your surgeon while on pain medications. Over the counter Colace 100 mg 1-2 capsules twice daily.   · If stools become too loose or too frequent, please decreases the dosage or stop the stool softener.  · If constipation occurs despite use of stool softeners, you are to continue the stool softeners and add a laxative (Milk of Magnesia 1 ounce daily as needed).  · Dulcolax oral tabs or suppository, or a fleets enema can also be utilized for constipation and can be obtained over the counter.   · If above interventions are unsuccessful in inducing bowel movements, please contact your surgeon's office / family physician's office.  · Drink plenty of fluids, and eat fruits and vegetables during your recovery time     2. Pain Medications utilized after surgery are narcotics and the law requires that the following information be given to all patients that are prescribed narcotics:  · CLASSIFICATION: Pain medications are called Opioids and are narcotics  · LEGALITIES: It is illegal to share narcotics with others and to drive within 24 hours of taking narcotics  · POTENTIAL SIDE EFFECTS: Potential side effects of opioids include: nausea, vomiting, itching, dizziness, drowsiness, dry mouth, constipation, and difficulty urinating.  · POTENTIAL ADVERSE EFFECTS:   ? Opioid tolerance can develop with use of pain medications and this simply means that it requires more and more of the medication to control pain; however, this is seen more in patients that use opioids for longer periods of time.  ? Opioid dependence can develop with use of Opioids and this simply means that to stop the medication can cause withdrawal symptoms; however, this is seen with patients that use Opioids for longer periods of  time.  ? Opioid addiction can develop with use of Opioids and the incidence of this is very unlikely in patients who take the medications as ordered and stop the medications as instructed.  ? Opioid overdose can be dangerous, but is unlikely when the medication is taken as ordered and stopped when ordered. It is important not to mix opioids with alcohol or with and type of sedative such as Benadryl as this can lead to over sedation and respiratory difficulty.  · DOSAGE:   ? Pain medications will need to be taken consistently for the first week to decrease pain and promote adequate pain relief and participation in physical therapy.  ? After the initial surgical pain begins to resolve, you may begin to decrease the pain medication. By the end of 6 weeks, you should be off of pain medications.  ? Refills will not be given by the office during evening hours, on weekends, or after 6 weeks post-op.  ? To seek refills on pain medications during the initial 6 week post-operative period, you must call the office 48 hours in advance to request the refill. The office will then notify you when to  the prescription. DO NOT wait until you are out of the medication to request a refill.     V. FOLLOW-UP VISITS:  · You will need to follow up in the office with your surgeon on September 2, 2020. Please call this number [phone]  to schedule this appointment.  If you have any concerns or suspected complications prior to your follow up visit, please call your surgeons office. Do not wait until your appointment time if you suspect complications. These will need to be addressed in the office promptly.

## 2020-08-19 NOTE — PLAN OF CARE
Problem: Patient Care Overview  Goal: Plan of Care Review  Outcome: Outcome(s) achieved  Flowsheets (Taken 8/19/2020 1807)  Progress: improving  Plan of Care Reviewed With: patient  Outcome Summary: Patient is ambulating with stand by assist without difficulty. VSS and voiding function intact. Pain is managed with po meds and PONV resolving, managed well with po zofran and phenergan. Patient is prepared and ready for d/c home with family assist.

## 2020-08-19 NOTE — PROGRESS NOTES
Patient: Kathy Ramirez  YOB: 1955     Date of Admission: 8/18/2020  5:17 AM Medical Record Number: [id number]     Attending Physician: Betito Delaney,*    Procedure(s):  REPAIR NONUNION RIGHT HUMERUS SHAFT FRACTURE, EXPLORATION RADIAL NERVE Post Operative Day Number: 1    Subjective : No new orthopaedic complaints     Pain Relief: some relief with present medication.     Systemic Complaints: PONV  Vitals:    08/18/20 1353 08/18/20 1923 08/18/20 2248 08/19/20 0235   BP: 135/73 135/72 100/62 112/64   BP Location: Left arm Left arm Left arm Left arm   Patient Position: Lying Lying Lying Lying   Pulse: 75 90 98 85   Resp: 16 16 16 16   Temp: 97 °F (36.1 °C) 99.3 °F (37.4 °C) 99.8 °F (37.7 °C) 99.3 °F (37.4 °C)   TempSrc: Oral Oral Oral Skin   SpO2: 95% 97% 99% 95%   Weight:       Height:           Physical Exam: 65 y.o. female    General Appearance:       Alert, cooperative, in no acute distress                  Extremities:    Dressing Clean, Dry and Intact         Incision healthy without signs or symptoms of infections         No clinical sign of DVT        Able to do good movements of digits    Pulses:   Pulses palpable and equal bilaterally           Diagnostic Tests:                 No results found for: CRP  No results found for: SEDRATE  No results found for: URICACID  No results found for: CRYSTAL  Microbiology Results (last 10 days)     Procedure Component Value - Date/Time    Tissue / Bone Culture - Tissue, Arm, Right [[id number]] Collected:  08/18/20 0836    Lab Status:  Preliminary result Specimen:  Tissue from Arm, Right Updated:  08/18/20 1421     Gram Stain No WBCs or organisms seen    COVID PRE-OP / PRE-PROCEDURE SCREENING ORDER (NO ISOLATION) - Swab, Nasopharynx [[id number]] Collected:  08/15/20 1739    Lab Status:  Final result Specimen:  Swab from Nasopharynx Updated:  08/17/20 162    Narrative:       The following orders were created for panel order COVID PRE-OP /  PRE-PROCEDURE SCREENING ORDER (NO ISOLATION) - Swab, Nasopharynx.  Procedure                               Abnormality         Status                     ---------                               -----------         ------                     COVID-19,BIOTAP, NP/OP S...[[id number]]                      Final result                 Please view results for these tests on the individual orders.    COVID-19,BIOTAP, NP/OP SWAB IN TRANSPORT MEDIA OR SALINE 24-36 HR TAT - Swab, Nasopharynx [[id number]] Collected:  08/15/20 5904    Lab Status:  Final result Specimen:  Swab from Nasopharynx Updated:  08/17/20 9766     Reference Lab Report --     COVID19 Not Detected    Narrative:       Testing performed at:  [facility]  [address] [place] [id number]        Xr Chest Pa & Lateral    Result Date: 8/11/2020  No active disease  This report was finalized on 8/11/2020 9:19 AM by Dr. Conrado Siu M.D.              Current Medications:  Scheduled Meds:  ceFAZolin 2 g Intravenous Q8H   gabapentin 100 mg Oral Q12H   glipizide 5 mg Oral QAM AC   hydroCHLOROthiazide 12.5 mg Oral Daily   insulin lispro 0-9 Units Subcutaneous TID AC   lisinopril 5 mg Oral Q24H   metFORMIN 500 mg Oral BID With Meals   metoprolol succinate XL 75 mg Oral Nightly   pantoprazole 40 mg Oral Q PM   rOPINIRole 2 mg Oral Nightly     Continuous Infusions:   PRN Meds:.cyclobenzaprine  •  dextrose  •  dextrose  •  glucagon (human recombinant)  •  HYDROcodone-acetaminophen **OR** HYDROcodone-acetaminophen  •  ondansetron  •  promethazine    Assessment:    Procedure(s):  REPAIR NONUNION RIGHT HUMERUS SHAFT FRACTURE, EXPLORATION RADIAL NERVE    Patient Active Problem List   Diagnosis   • Lumbar canal stenosis   • Diabetes mellitus (CMS/HCC)   • Hypertension   • Rheumatoid arthritis (CMS/HCC)   • Cervical radiculitis   • Acute pain of left shoulder   • Family history of early CAD   • Hyperlipidemia LDL goal <100   • Orthostasis   • DDD  (degenerative disc disease), lumbar   • History of echocardiogram   • S/P reverse total shoulder arthroplasty, right   • 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 We will order PO phenergan to see if this helps.   We will change her pain medication to Percocet 5/325 q4 hours to see if this helps with her pain.    Weight Bearing: NWB  Discharge Plan: OK to plan for discharge in  today/tomorrow to home and home health  from orthopadic perspective.      Kayla Blue, APRN    Date: 8/19/2020    Time: 07:02

## 2020-08-19 NOTE — DISCHARGE SUMMARY
Orthopedic Discharge Summary      Patient: Kathy Ramirez   YOB: 1955    Medical Record Number: [id number]    Attending Physician: Betito Delaney,*    Consulting Physician(s):   Consulting Physician(s)             None            Date of Admission: 8/18/2020  5:17 AM  Date of Discharge:     Procedure(s):  REPAIR NONUNION RIGHT HUMERUS SHAFT FRACTURE, EXPLORATION RADIAL NERVE    Patient Active Problem List   Diagnosis   • Lumbar canal stenosis   • Diabetes mellitus (CMS/HCC)   • Hypertension   • Rheumatoid arthritis (CMS/HCC)   • Cervical radiculitis   • Acute pain of left shoulder   • Family history of early CAD   • Hyperlipidemia LDL goal <100   • Orthostasis   • DDD (degenerative disc disease), lumbar   • History of echocardiogram   • S/P reverse total shoulder arthroplasty, right   • 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       No Known Allergies       Discharge Medications      New Medications      Instructions Start Date   HYDROcodone-acetaminophen 7.5-325 MG per tablet  Commonly known as:  NORCO   1-2 tablets, Oral, Every 4 Hours PRN      ondansetron 4 MG tablet  Commonly known as:  Zofran   4 mg, Oral, Every 8 Hours PRN      promethazine 12.5 MG tablet  Commonly known as:  PHENERGAN   12.5 mg, Oral, Every 6 Hours PRN         Changes to Medications      Instructions Start Date   pravastatin 10 MG tablet  Commonly known as:  PRAVACHOL  What changed:  when to take this   10 mg, Oral, Daily         Continue These Medications      Instructions Start Date   acyclovir 400 MG tablet  Commonly known as:  ZOVIRAX   400 mg, Oral, As Needed      cyclobenzaprine 10 MG tablet  Commonly known as:  FLEXERIL   10 mg, Oral, 3 Times Daily PRN      gabapentin 100 MG capsule  Commonly known as:  NEURONTIN   100 mg, Oral, 2 times daily      glimepiride 2 MG tablet  Commonly known as:  AMARYL   2 mg, Oral, Every Morning Before Breakfast      hydroCHLOROthiazide 12.5 MG  capsule  Commonly known as:  MICROZIDE   12.5 mg, Oral, Every Morning      insulin detemir 100 UNIT/ML injection  Commonly known as:  LEVEMIR   Subcutaneous, 2 Times Daily PRN, SLIDING SCALE      leflunomide 20 MG tablet  Commonly known as:  ARAVA   20 mg, Oral, Nightly      lisinopril 5 MG tablet  Commonly known as:  PRINIVIL,ZESTRIL   5 mg, Oral, Every Morning      metFORMIN 500 MG tablet  Commonly known as:  GLUCOPHAGE   500 mg, Oral, 2 Times Daily      omeprazole 40 MG capsule  Commonly known as:  priLOSEC   40 mg, Oral, Every Evening      rOPINIRole 2 MG tablet  Commonly known as:  REQUIP   2 mg, Oral, Nightly      SITagliptin 100 MG tablet  Commonly known as:  JANUVIA   100 mg, Oral, Nightly      Toprol XL 50 MG 24 hr tablet  Generic drug:  metoprolol succinate XL   75 mg, Oral, Nightly         Stop These Medications    oxyCODONE-acetaminophen  MG per tablet  Commonly known as:  PERCOCET               Past Medical History:   Diagnosis Date   • Arm pain     RIGHT   • Chronic low back pain    • DDD (degenerative disc disease), lumbar    • Diabetes mellitus (CMS/HCC)     TYPE 2   • Dysrhythmia    • GERD (gastroesophageal reflux disease)    • Herpes     GETS FEVER BLISTERS AT TIMES   • History of echocardiogram 11/13/2019    EF 68% Grade 1 diastolic dysfucntion. Mild AR. Trace TR.    • History of hepatitis A     1970   • History of transfusion    • Humerus shaft fracture     RIGHT   • Hyperlipidemia    • Hypertension    • IBS (irritable bowel syndrome)    • Lumbar canal stenosis    • Lumbar radiculopathy    • PONV (postoperative nausea and vomiting)    • Psoriasis    • Psoriatic arthritis (CMS/HCC)    • Rheumatoid arthritis (CMS/HCC)    • RLS (restless legs syndrome)    • Sinus tachycardia         Past Surgical History:   Procedure Laterality Date   • ANTERIOR CERVICAL DISCECTOMY W/ FUSION N/A 7/2/2018    Procedure: C5 6 anterior cervical discectomy, fusion and instrumentation;  Surgeon: Robbie Rahman MD;   Location: [facility] MAIN OR;  Service: Neurosurgery   • APPENDECTOMY     •  SECTION      5 csection   • CHOLECYSTECTOMY     • COLONOSCOPY     • HYSTERECTOMY     • KNEE ARTHROPLASTY Left    • KNEE ARTHROPLASTY Right    • TOTAL SHOULDER ARTHROPLASTY W/ DISTAL CLAVICLE EXCISION Right 3/10/2020    Procedure: RIGHT TOTAL SHOULDER REVERSE ARTHROPLASTY, ORIF OF PERIPROSTHETIC FRACTURE;  Surgeon: Festus Siu MD;  Location: [facility];  Service: Orthopedics;  Laterality: Right;   • WISDOM TOOTH EXTRACTION          Social History     Occupational History   • Occupation: Cancer    Tobacco Use   • Smoking status: Never Smoker   • Smokeless tobacco: Never Used   Substance and Sexual Activity   • Alcohol use: Yes     Comment: rare   • Drug use: No   • Sexual activity: Defer      Social History     Social History Narrative   • Not on file        Family History   Problem Relation Age of Onset   • Cerebral aneurysm Mother    • Leukemia Father         Has pacemaker   • Prostate cancer Father    • Skin cancer Father    • Breast cancer Sister    • Hyperlipidemia Sister    • Hypertension Brother    • Hyperlipidemia Brother    • Irregular heart beat Brother    • Malig Hyperthermia Neg Hx        Physical Exam: 65 y.o. female  General Appearance:    Alert, cooperative, in no acute distress                      Vitals:    20 1923 20 2248 20 0235 20 0700   BP: 135/72 100/62 112/64 102/67   BP Location: Left arm Left arm Left arm Left arm   Patient Position: Lying Lying Lying Lying   Pulse: 90 98 85 70   Resp: 16 16 16 16   Temp: 99.3 °F (37.4 °C) 99.8 °F (37.7 °C) 99.3 °F (37.4 °C) 98.7 °F (37.1 °C)   TempSrc: Oral Oral Skin Skin   SpO2: 97% 99% 95% 92%   Weight:       Height:            Hospital Course:  65 y.o. female admitted to [facility] to services of Betito Delaney,* with Closed displaced comminuted fracture of the RIGHT humerus  with nonunion on 8/18/2020 and underwent REPAIR NONUNION RIGHT HUMERUS SHAFT FRACTURE, EXPLORATION RADIAL NERVE per Kayla Blue, CECILIA. Antibiotic Kefzol 2 gm every 8 hours  and VTE prophylaxis including sequential compression devices were per SCIP protocols. Post-operatively the patient transferred to the post-operative floor where the patient underwent mobilization therapy that included active ROM exercises. Opioids were titrated to achieve appropriate pain management to allow for participation in mobilization exercises. Vital signs are now stable. The incision is intact without signs or symptoms of infection. Operative extremity neurovascular status remains intact.     Appropriate education re: incision care, activity levels, medications, dislocation precautions, and follow up visits was completed and all questions were answered. The patient is now deemed stable for discharge.      DISCHARGE DISPOSITION AND PLAN:  The  Patient is being discharged home with home health for PT   2-3 X per week for 2-3 weeks and nursing care as needed.       DIAGNOSTIC TESTS:     Admission on 08/18/2020   Component Date Value Ref Range Status   • Glucose 08/18/2020 171* 70 - 130 mg/dL Final   • Tissue Culture 08/18/2020 No growth   Preliminary   • Gram Stain 08/18/2020 No WBCs or organisms seen   Preliminary   • Glucose 08/18/2020 268* 70 - 130 mg/dL Final   • Glucose 08/18/2020 272* 70 - 130 mg/dL Final   • Glucose 08/18/2020 223* 70 - 130 mg/dL Final   • Glucose 08/18/2020 164* 70 - 130 mg/dL Final   • Glucose 08/19/2020 188* 70 - 130 mg/dL Final     No results found for: URICACID  No results found for: CRYSTAL  Microbiology Results (last 10 days)     Procedure Component Value - Date/Time    Tissue / Bone Culture - Tissue, Arm, Right [[id number]] Collected:  08/18/20 0836    Lab Status:  Preliminary result Specimen:  Tissue from Arm, Right Updated:  08/19/20 0706     Tissue Culture No growth     Gram Stain No WBCs or organisms  seen    COVID PRE-OP / PRE-PROCEDURE SCREENING ORDER (NO ISOLATION) - Swab, Nasopharynx [[id number]] Collected:  08/15/20 1739    Lab Status:  Final result Specimen:  Swab from Nasopharynx Updated:  08/17/20 1629    Narrative:       The following orders were created for panel order COVID PRE-OP / PRE-PROCEDURE SCREENING ORDER (NO ISOLATION) - Swab, Nasopharynx.  Procedure                               Abnormality         Status                     ---------                               -----------         ------                     COVID-19,BIOTAP, NP/OP S...[[id number]]                      Final result                 Please view results for these tests on the individual orders.    COVID-19,BIOTAP, NP/OP SWAB IN TRANSPORT MEDIA OR SALINE 24-36 HR TAT - Swab, Nasopharynx [[id number]] Collected:  08/15/20 1739    Lab Status:  Final result Specimen:  Swab from Nasopharynx Updated:  08/17/20 1629     Reference Lab Report --     COVID19 Not Detected    Narrative:       Testing performed at:  [facility]  [address] [place] [id number]        Xr Chest Pa & Lateral    Result Date: 8/11/2020  No active disease  This report was finalized on 8/11/2020 9:19 AM by Dr. Conrado Siu M.D.          Discharge and Follow up Instructions:     I. ACTIVITIES:  1. Exercises:  ? Complete exercise program as taught by the hospital physical therapist 2 times per day  ? Wear sling when in bed and when out of bed (except when doing exercises)  ? Exercise program will be advanced by the physical therapist  ? During the day be up ambulating every 2 hours (while awake) for short distances  ? Complete the ankle pump exercises at least 10 times per hour (while awake)  ? Elevate operative arm when in bed and for at least 30 minutes during the day.   ? Use cold packs 20-30 minutes approximately 5 times per day. This should be done before and after completing your exercises and at any time you are experiencing pain/  stiffness in your operative extremity.    2. Activities of Daily Living:  ? No tub baths, hot tubs, or swimming pools for 4 weeks  ? May shower and let water run over the incision on post-operative day #5 if no drainage. Do not scrub or rub the incision. Simply let the water run over the incision and pat dry.    II. Restrictions  ? No pushing, pulling, lifting, or weight bearing on operative extremity.  ? Continue to follow shoulder precautions as instructed by hospital physical therapist  ? Your surgeon will discuss with you when you will be able to drive again. Usual guidelines are you are to be off pain medications prior to driving.  ? First week stay inside on even terrain. May go up and down stairs one stair at a time utilizing the hand rail once cleared by physical therapy to do so.  ? After one week, you may venture outside (if cleared to do so by physical therapist).    III. Precautions:  ? Everyone that comes near you should wash their hands  ? No elective dental, genital-urinary, or colon procedures or surgical procedures for 12 weeks after surgery unless absolutely necessary.  ?  If dental work or surgical procedure is deemed absolutely necessary, you will need to contact your surgeon as you will need to take antibiotics 1 hour prior to any dental work (including teeth cleanings).  ? Please discuss with your surgeon prophylactic antibiotics as the length of time this intervention will be necessary for you varies with each patient’s health history and situation.  ? Avoid sick people. If you must be around someone who is ill, they should wear a mask.  ? Avoid visits to the Emergency Room or Urgent Care. If you feel you need to go to the Emergency Room, please notify your Surgeon's office.  ? Stockings are to be worn for one week after surgery and are to be placed on in the morning and removed at night. Observe your skin when stocking is removed for any problems. Monitor the stockings to ensure that any  swelling is not causing the stockings to become too tight. In this case, remove stockings immediately.      IV. INCISION CARE:  ? Wash your hands prior to dressing changes  ? Change the dressing as needed to keep incision clean and dry. Utilize dry gauze and paper tape. Avoid touching the side of the gauze that goes against the incision with your hands.  ? No creams or ointments to the incision  ? May remove dressing once the incision is free of drainage  ? Do not touch or pick at the incision  ? Check incision every day and notify surgeon immediately if any of the following signs or symptoms are noted:  o Increase in redness  o Increase in swelling around the incision and of the entire extremity  o Increase in pain  o Drainage oozing from the incision  o Pulling apart of the edges of the incision  o Increase in overall body temperature (greater than 100.5 degrees)  ? Your Staples will be removed between 10-14 days postoperation.  This may be done by either the home health nurse, rehabilitation nurse or during your return visit to Dr. Delaney's office.  You will then be instructed on how to care for the incision.  (Please call the office if your staples have not been removed within 14 days after surgery).    V. Medications:     1. Stool Softeners: You will be at greater risk of constipation after surgery due to being less mobile and the pain medications.   ? Take stool softeners as instructed by your surgeon while on pain medications. Over the counter Colace 100 mg 1-2 capsules twice daily.   ? If stools become too loose or too frequent, please decreases the dosage or stop the stool softener.  ? If constipation occurs despite use of stool softeners, you are to continue the stool softeners and add a laxative (Milk of Magnesia 1 ounce daily as needed).  ? Dulcolax oral tabs or suppository, or a fleets enema can also be utilized for constipation and can be obtained over the counter.   ? If above interventions are  unsuccessful in inducing bowel movements, please contact your surgeon's office / family physician's office.  ? Drink plenty of fluids, and eat fruits and vegetables during your recovery time    2. Pain Medications utilized after surgery are narcotics and the law requires that the following information be given to all patients that are prescribed narcotics:  ? CLASSIFICATION: Pain medications are called Opioids and are narcotics  ? LEGALITIES: It is illegal to share narcotics with others and to drive within 24 hours of taking narcotics  ? POTENTIAL SIDE EFFECTS: Potential side effects of opioids include: nausea, vomiting, itching, dizziness, drowsiness, dry mouth, constipation, and difficulty urinating.  ? POTENTIAL ADVERSE EFFECTS:   o Opioid tolerance can develop with use of pain medications and this simply means that it requires more and more of the medication to control pain; however, this is seen more in patients that use opioids for longer periods of time.  o Opioid dependence can develop with use of Opioids and this simply means that to stop the medication can cause withdrawal symptoms; however, this is seen with patients that use Opioids for longer periods of time.  o Opioid addiction can develop with use of Opioids and the incidence of this is very unlikely in patients who take the medications as ordered and stop the medications as instructed.  o Opioid overdose can be dangerous, but is unlikely when the medication is taken as ordered and stopped when ordered. It is important not to mix opioids with alcohol or with and type of sedative such as Benadryl as this can lead to over sedation and respiratory difficulty.  ? DOSAGE:   o Pain medications will need to be taken consistently for the first week to decrease pain and promote adequate pain relief and participation in physical therapy.  o After the initial surgical pain begins to resolve, you may begin to decrease the pain medication. By the end of 6 weeks,  you should be off of pain medications.  o Refills will not be given by the office during evening hours, on weekends, or after 6 weeks post-op.  o To seek refills on pain medications during the initial 6 week post-operative period, you must call the office 48 hours in advance to request the refill. The office will then notify you when to  the prescription. DO NOT wait until you are out of the medication to request a refill.    V. FOLLOW-UP VISITS:  ? You will need to follow up in the office with your surgeon on September 2, 2020. Please call this number [phone]  to schedule this appointment.  If you have any concerns or suspected complications prior to your follow up visit, please call your surgeons office. Do not wait until your appointment time if you suspect complications. These will need to be addressed in the office promptly.    CECILIA Anand      CC: Lucy Turcios APRN; CECILIA Anand Madhusudhan R,*

## 2020-08-21 LAB
BACTERIA SPEC AEROBE CULT: NORMAL
GRAM STN SPEC: NORMAL

## 2020-08-23 LAB — BACTERIA SPEC ANAEROBE CULT: NORMAL

## 2021-06-07 ENCOUNTER — APPOINTMENT (OUTPATIENT)
Dept: WOMENS IMAGING | Facility: HOSPITAL | Age: 66
End: 2021-06-07

## 2021-06-07 PROCEDURE — 77067 SCR MAMMO BI INCL CAD: CPT | Performed by: RADIOLOGY

## 2021-06-07 PROCEDURE — 77063 BREAST TOMOSYNTHESIS BI: CPT | Performed by: RADIOLOGY

## 2021-06-24 ENCOUNTER — OFFICE VISIT (OUTPATIENT)
Dept: CARDIOLOGY | Facility: CLINIC | Age: 66
End: 2021-06-24

## 2021-06-24 VITALS
BODY MASS INDEX: 27.46 KG/M2 | SYSTOLIC BLOOD PRESSURE: 122 MMHG | WEIGHT: 155 LBS | HEART RATE: 87 BPM | HEIGHT: 63 IN | DIASTOLIC BLOOD PRESSURE: 74 MMHG | RESPIRATION RATE: 18 BRPM

## 2021-06-24 DIAGNOSIS — E78.5 HYPERLIPIDEMIA LDL GOAL <100: Primary | ICD-10-CM

## 2021-06-24 DIAGNOSIS — R06.02 SHORTNESS OF BREATH: ICD-10-CM

## 2021-06-24 DIAGNOSIS — Z92.89 HISTORY OF ECHOCARDIOGRAM: ICD-10-CM

## 2021-06-24 DIAGNOSIS — I10 ESSENTIAL HYPERTENSION: ICD-10-CM

## 2021-06-24 PROCEDURE — 99213 OFFICE O/P EST LOW 20 MIN: CPT | Performed by: INTERNAL MEDICINE

## 2021-06-24 NOTE — PROGRESS NOTES
Cardiology clinic note  Russel Spear MD, PhD  Subjective:     Encounter Date:06/24/2021      Patient ID: Kathy Ramirez is a 66 y.o. female.    Chief Complaint:  Chief Complaint   Patient presents with   • Follow-up       HPI:  History of Present Illness  Per prior encounter  I had the pleasure seeing this very pleasant 64-year-old female who is here from primary care Lucy Turcios MD.  She is referred with family history of coronary artery disease as well as comorbidities of hypertension hyperlipidemia and diabetes for establishment of care.  She does complain of intermittent presyncope when she stands up most consistent with orthostatic hypotension.  Blood pressure today is 110/66 with a heart rate of 70s regular.  She also has a history of hyperlipidemia and is intermittently compliant with atorvastatin given severe myalgias and muscle aches.  She has not tried many other statins.  We did discuss alternatives on initial encounter and she is open to changes.  She is largely sedentary lifestyle and at 170 pounds above her goal body weight.  We discussed diet and exercise extensively today.  She has significant osteoarthritis and is pending shoulder repair or replacement.  This, limits her exercise abilities.  Related to her functional class, She denies overt chest pain but does have dyspnea on exertion NYHA class II symptoms.  No history of overt CAD or PAD.  No history of stroke.  Essentially no other complaints.  She needs refills of her hydrochlorothiazide today.       Today she presents back to clinic with very well-controlled blood pressure 122/74 heart rate 80s.  She has minimal dyspnea on exertion above baseline NYHA class II symptoms and is mostly limited by orthopedic complaints with osteoarthritis in her hips as well as difficulty moving the right shoulder which is pending repair at this time.  She has no contraindication from a cardiovascular standpoint for perioperative concerns.  She has normal LV  systolic function, she is on beta-blocker at baseline and her blood pressure is well controlled and she is on low-dose statin therapy.  She has no other complaints today such as any anginal or exertional chest pain with stable dyspnea exertion is multifactorial.    She is euvolemic, no heart failure signs or symptoms no PND orthopnea palpitations chest pain or new or concerning symptoms.  She is very stable on my encounter today and can be seen back in 6 months to 1 year or as needed in between.  She has good primary care follow-up and can see them in the interim.  She will be given medicine refills today for 1 year with respect to metoprolol lisinopril and pravastatin.     Historical data copied forward from previous encounters is unchanged        Review of systems x14 point review of systems is negative except was mentioned above.      The following portions of the patient's history were reviewed and updated as appropriate: allergies, current medications, past family history, past medical history, past social history, past surgical history and problem list.    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 Mild AR. Trace TR.    • History of hepatitis A        • History of transfusion    • Humerus shaft fracture     RIGHT   • Hyperlipidemia    • Hypertension    • IBS (irritable bowel syndrome)    • Lumbar canal stenosis    • Lumbar radiculopathy    • PONV (postoperative nausea and vomiting)    • Psoriasis    • Psoriatic arthritis (CMS/HCC)    • Rheumatoid arthritis (CMS/HCC)    • RLS (restless legs syndrome)    • Sinus tachycardia        Past Surgical History:  Past Surgical History:   Procedure Laterality Date   • ANTERIOR CERVICAL DISCECTOMY W/ FUSION N/A 2018    Procedure: C5 6 anterior cervical discectomy, fusion and instrumentation;  Surgeon: Robbie Rahman MD;  Location: [facility];  Service: Neurosurgery   • APPENDECTOMY     •  SECTION      5 csection   • CHOLECYSTECTOMY     • COLONOSCOPY     • HYSTERECTOMY     • KNEE ARTHROPLASTY Left    • KNEE ARTHROPLASTY Right    • ORIF HUMERUS FRACTURE Right 2020    Procedure: REPAIR NONUNION RIGHT HUMERUS SHAFT FRACTURE, EXPLORATION RADIAL NERVE;  Surgeon: Betito Delaney MD;  Location: [facility];  Service: Orthopedics;  Laterality: Right;   • TOTAL SHOULDER ARTHROPLASTY W/ DISTAL CLAVICLE EXCISION Right 3/10/2020    Procedure: RIGHT TOTAL SHOULDER REVERSE ARTHROPLASTY, ORIF OF PERIPROSTHETIC FRACTURE;  Surgeon: Festus Siu MD;  Location: [facility];  Service: Orthopedics;  Laterality: Right;   • WISDOM TOOTH EXTRACTION         Social History:  Social History     Socioeconomic History   • Marital status:      Spouse name: Not on file   • Number of children: 5   • Years of education: MA   • Highest education level: Not on file   Tobacco Use   • Smoking status: Never Smoker   • Smokeless tobacco: Never Used   Substance and Sexual Activity   • Alcohol use: Yes     Comment: rare   • Drug use: No   • Sexual activity: Defer       Allergies:  No Known  "Allergies    Immunizations:    There is no immunization history on file for this patient.    ROS:  ROS       Objective:         /74 (BP Location: Left arm, Patient Position: Sitting)   Pulse 87   Resp 18   Ht 160 cm (63\")   Wt 70.3 kg (155 lb)   BMI 27.46 kg/m²     Physical Exam    In-Office Procedure(s):  Procedures    ASCVD RIsk Score::  The ASCVD Risk score (Yen GALE Jr., et al., 2013) failed to calculate for the following reasons:    Cannot find a previous HDL lab    Cannot find a previous total cholesterol lab    Recent Radiology:  Imaging Results (Most Recent)     None          Lab Review:   No visits with results within 6 Month(s) from this visit.   Latest known visit with results is:   Admission on 08/18/2020, Discharged on 08/19/2020   Component Date Value   • Glucose 08/18/2020 171*   • Anaerobic Culture 08/18/2020 No anaerobes isolated at 5 days    • Tissue Culture 08/18/2020 No growth at 3 days    • Gram Stain 08/18/2020 No WBCs or organisms seen    • Glucose 08/18/2020 268*   • Glucose 08/18/2020 272*   • Glucose 08/18/2020 223*   • Glucose 08/18/2020 164*   • Glucose 08/19/2020 188*   • Glucose 08/19/2020 222*   • BUN 08/19/2020 11    • Creatinine 08/19/2020 0.89    • Sodium 08/19/2020 132*   • Potassium 08/19/2020 3.6    • Chloride 08/19/2020 100    • CO2 08/19/2020 23.8    • Calcium 08/19/2020 8.1*   • eGFR Non  Amer 08/19/2020 63    • BUN/Creatinine Ratio 08/19/2020 12.4    • Anion Gap 08/19/2020 8.2    • WBC 08/19/2020 8.52    • RBC 08/19/2020 3.22*   • Hemoglobin 08/19/2020 8.9*   • Hematocrit 08/19/2020 27.3*   • MCV 08/19/2020 84.8    • MCH 08/19/2020 27.6    • MCHC 08/19/2020 32.6    • RDW 08/19/2020 16.3*   • RDW-SD 08/19/2020 50.7    • MPV 08/19/2020 11.6    • Platelets 08/19/2020 230    • Neutrophil % 08/19/2020 64.7    • Lymphocyte % 08/19/2020 17.5*   • Monocyte % 08/19/2020 15.4*   • Eosinophil % 08/19/2020 1.4    • Basophil % 08/19/2020 0.6    • Immature Grans % " 08/19/2020 0.4    • Neutrophils, Absolute 08/19/2020 5.52    • Lymphocytes, Absolute 08/19/2020 1.49    • Monocytes, Absolute 08/19/2020 1.31*   • Eosinophils, Absolute 08/19/2020 0.12    • Basophils, Absolute 08/19/2020 0.05    • Immature Grans, Absolute 08/19/2020 0.03    • nRBC 08/19/2020 0.0    • Glucose 08/19/2020 165*   • Glucose 08/19/2020 153*                Assessment:       primary prevention goals for CAD and PAD, completely asymptomatic presently, no chest pain or claudication     Hyperlipidemia, continue pravastatin 10 mg p.o. daily, fasting lipid panel in 3 months with CMP, primary care can modify accordingly with respect to statin therapy     Hypertension, refill HCTZ 12.5 p.o. daily, hypertension at goal 130/78     Sedentary lifestyle refer to cardiac rehab if qualified,  EKG previous clinic normal sinus rhythm     2D echo for dyspnea on exertion demonstrates normal LV systolic function EF 68% with grade 1 diastolic dysfunction, no significant valvulopathy, normal right and left-sided pressures estimated, normal atrial sizes no significant abnormality seen.     Return to clinic in 12 months or sooner if needed, follow-up primary care in interim     Thank you very much for the referral.  It is a pleasure to be involved in her cardiac cardiovascular care.  Please call with any questions or concerns        No concern over perioperative concerns, continue statin and beta-blocker perioperatively  No changes today     Russel Spear MD PhD        25 minutes spent with the patient going over echo results and all images, re-stratification, questions answered to the patient, review of medicines along with discussion of side effects related to osteoarthritis and muscle and joint pains as well as perioperative concerns by the patient.            Level of Care:                 Russel Spear MD  06/24/21  .

## 2021-10-12 ENCOUNTER — TELEPHONE (OUTPATIENT)
Dept: ORTHOPEDIC SURGERY | Facility: CLINIC | Age: 66
End: 2021-10-12

## 2021-10-12 NOTE — TELEPHONE ENCOUNTER
PATIENT CALLED ABOUT GETTING IN THE SEE DR MATOS FOR HER KNEE, SHE HAS BEEN ON VACATION AND FELL ON HER KNEE, I LET HER KNOW WHEN SHE WAS BACK IN [place] SHE WILL NEED TO HAVE HER KNEE XRAYED AND LET US KNOW IF SHE HAS A FRACTURE WE COULD GET HER IN SOONER, SHE WAS IN UNDERSTANDING

## 2022-06-20 NOTE — PROGRESS NOTES
"Subjective   Patient ID: Kathy Ramirez is a 67 y.o. female is here today for follow-up for intervertebral disc disorders with radiculopathy, lumbar region. Patient had a MRI L-spine done on 03.11.2022 at [facility].    Today patient states that she has low back pain that radiates to B/L legs, with severe L thigh pain. Patient states that she has NT in B/L feet. Patient denies difficulty with urination.      Patient, Provider, and MA are all wearing a mask in our office today.     History of Present Illness     This patient has been having increasing trouble with her lower back with radiation to both of her legs.  Previously it had just been radiating into her right leg but over the past several months it has been in the left anterior thigh.  She did have a fracture femur on the left side in the October of last year but this is now healed.  She is still having trouble with the left leg.  She has no difficulty with bowel bladder control or other associated symptoms.  She does have 4 sets of epidural blocks in the past and is currently doing physical therapy but it is not helping.  She does have a history of neck pain as well.    The following portions of the patient's history were reviewed and updated as appropriate: allergies, current medications, past family history, past medical history, past social history, past surgical history and problem list.    Review of Systems   Constitutional: Negative for chills and fever.   HENT: Negative for congestion.    Genitourinary: Negative for difficulty urinating and dyspareunia.   Musculoskeletal: Positive for back pain, gait problem and myalgias.        B/L leg pain   Neurological: Positive for weakness and numbness.       I have reviewed the review of systems as documented by my MA.      Objective     Vitals:    06/21/22 0928   BP: 134/84   Cuff Size: Adult   Pulse: 89   Temp: 98 °F (36.7 °C)   SpO2: 96%   Weight: 70.3 kg (155 lb)   Height: 160 cm (63\")     Body " mass index is 27.46 kg/m².      Physical Exam  Eyes:      Extraocular Movements: EOM normal.      Pupils: Pupils are equal, round, and reactive to light.   Neurological:      Mental Status: She is alert and oriented to person, place, and time.      Coordination: Finger-Nose-Finger Test and Heel to Shin Test normal.      Gait: Gait is intact.      Deep Tendon Reflexes:      Reflex Scores:       Tricep reflexes are 2+ on the right side and 2+ on the left side.       Bicep reflexes are 2+ on the right side and 2+ on the left side.       Brachioradialis reflexes are 2+ on the right side and 2+ on the left side.       Patellar reflexes are 2+ on the right side and 2+ on the left side.       Achilles reflexes are 2+ on the right side and 2+ on the left side.  Psychiatric:         Speech: Speech normal.       Neurologic Exam     Mental Status   Oriented to person, place, and time.   Registration of memory: Good recent and remote memory.   Attention: normal. Concentration: normal.   Speech: speech is normal   Level of consciousness: alert  Knowledge: consistent with education.     Cranial Nerves     CN II   Visual fields full to confrontation.   Visual acuity: normal    CN III, IV, VI   Pupils are equal, round, and reactive to light.  Extraocular motions are normal.     CN V   Facial sensation intact.   Right corneal reflex: normal  Left corneal reflex: normal    CN VII   Facial expression full, symmetric.   Right facial weakness: none  Left facial weakness: none    CN VIII   Hearing: intact    CN IX, X   Palate: symmetric    CN XI   Right sternocleidomastoid strength: normal  Left sternocleidomastoid strength: normal    CN XII   Tongue: not atrophic  Tongue deviation: none    Motor Exam   Muscle bulk: normal  Right arm tone: normal  Left arm tone: normal  Right leg tone: normal  Left leg tone: normal    Strength   Strength 5/5 except as noted.     Sensory Exam   Light touch normal.     Gait, Coordination, and Reflexes      Gait  Gait: normal    Coordination   Finger to nose coordination: normal  Heel to shin coordination: normal    Reflexes   Right brachioradialis: 2+  Left brachioradialis: 2+  Right biceps: 2+  Left biceps: 2+  Right triceps: 2+  Left triceps: 2+  Right patellar: 2+  Left patellar: 2+  Right achilles: 2+  Left achilles: 2+  Right : 2+  Left : 2+          Assessment & Plan   Independent Review of Radiographic Studies:      I personally reviewed the images from the following studies.    I was unable to review the actual images from [facility] however I did review the report.  L2-3 is open but almost every other level shows a disc herniation that they describe is fairly large.    Medical Decision Making:      I told the patient I see little option at this point but to proceed with a lumbar myelogram.  I told the patient what a myelogram involves.  I explained that there is a 50% chance of developing a bad headache and nausea as a result of the test.  I explained that there is also a very small chance of infection, seizures, and bleeding.  I explained how we would treat a post myelogram headache including bedrest, caffeinated fluids, steroids, and blood patch.  The patient does ask to proceed.  I think we should look at her neck also.    Diagnoses and all orders for this visit:    1. Spinal stenosis of lumbar region with neurogenic claudication (Primary)  -     IR Myelogram 2 or More Areas; Future  -     CT Cervical Spine With Intrathecal Contrast; Future  -     CT Lumbar Spine With Intrathecal Contrast; Future  -     XR Spine Cervical Complete With Flex Ext; Future  -     XR Spine Lumbar Complete With Flex & Ext; Future  -     dexamethasone (DECADRON) 4 MG tablet; Take 2 tablets by mouth Take As Directed. Take both tablets by mouth 2 hours before myelogram  Dispense: 2 tablet; Refill: 0    2. Cervical radiculitis  -     IR Myelogram 2 or More Areas; Future  -     CT Cervical Spine With Intrathecal Contrast;  Future  -     CT Lumbar Spine With Intrathecal Contrast; Future  -     XR Spine Cervical Complete With Flex Ext; Future  -     XR Spine Lumbar Complete With Flex & Ext; Future  -     dexamethasone (DECADRON) 4 MG tablet; Take 2 tablets by mouth Take As Directed. Take both tablets by mouth 2 hours before myelogram  Dispense: 2 tablet; Refill: 0      Return for After radiology test.

## 2022-06-21 ENCOUNTER — OFFICE VISIT (OUTPATIENT)
Dept: NEUROSURGERY | Facility: CLINIC | Age: 67
End: 2022-06-21

## 2022-06-21 VITALS
DIASTOLIC BLOOD PRESSURE: 84 MMHG | HEART RATE: 89 BPM | SYSTOLIC BLOOD PRESSURE: 134 MMHG | BODY MASS INDEX: 27.46 KG/M2 | HEIGHT: 63 IN | OXYGEN SATURATION: 96 % | WEIGHT: 155 LBS | TEMPERATURE: 98 F

## 2022-06-21 DIAGNOSIS — M48.062 SPINAL STENOSIS OF LUMBAR REGION WITH NEUROGENIC CLAUDICATION: Primary | ICD-10-CM

## 2022-06-21 DIAGNOSIS — M54.12 CERVICAL RADICULITIS: ICD-10-CM

## 2022-06-21 PROCEDURE — 99214 OFFICE O/P EST MOD 30 MIN: CPT | Performed by: NEUROLOGICAL SURGERY

## 2022-06-21 RX ORDER — GABAPENTIN 300 MG/1
1 CAPSULE ORAL EVERY 8 HOURS
COMMUNITY
End: 2022-06-30 | Stop reason: HOSPADM

## 2022-06-21 RX ORDER — OXYCODONE HYDROCHLORIDE AND ACETAMINOPHEN 5; 325 MG/1; MG/1
TABLET ORAL
COMMUNITY

## 2022-06-21 RX ORDER — ONDANSETRON 4 MG/1
TABLET, ORALLY DISINTEGRATING ORAL
COMMUNITY

## 2022-06-21 RX ORDER — DEXAMETHASONE 4 MG/1
8 TABLET ORAL TAKE AS DIRECTED
Qty: 2 TABLET | Refills: 0 | Status: SHIPPED | OUTPATIENT
Start: 2022-06-21 | End: 2022-06-30 | Stop reason: HOSPADM

## 2022-06-21 RX ORDER — LEFLUNOMIDE 20 MG/1
1 TABLET ORAL DAILY
COMMUNITY
Start: 2022-01-21 | End: 2022-06-30 | Stop reason: HOSPADM

## 2022-06-21 RX ORDER — METFORMIN HYDROCHLORIDE 500 MG/1
TABLET, EXTENDED RELEASE ORAL
COMMUNITY
End: 2022-06-30 | Stop reason: HOSPADM

## 2022-06-21 RX ORDER — LISINOPRIL 2.5 MG/1
TABLET ORAL
COMMUNITY
End: 2022-06-30 | Stop reason: HOSPADM

## 2022-06-21 RX ORDER — NALOXONE HYDROCHLORIDE 4 MG/.1ML
SPRAY NASAL
COMMUNITY
Start: 2022-03-07

## 2022-06-21 RX ORDER — DORZOLAMIDE HCL 20 MG/ML
SOLUTION/ DROPS OPHTHALMIC
COMMUNITY

## 2022-06-21 RX ORDER — CYANOCOBALAMIN 1000 UG/ML
INJECTION, SOLUTION INTRAMUSCULAR; SUBCUTANEOUS
Status: ON HOLD | COMMUNITY
End: 2023-01-31

## 2022-06-30 ENCOUNTER — HOSPITAL ENCOUNTER (OUTPATIENT)
Dept: GENERAL RADIOLOGY | Facility: HOSPITAL | Age: 67
Discharge: HOME | End: 2022-06-30

## 2022-06-30 ENCOUNTER — HOSPITAL ENCOUNTER (OUTPATIENT)
Dept: CT IMAGING | Facility: HOSPITAL | Age: 67
Discharge: HOME | End: 2022-06-30

## 2022-06-30 VITALS
OXYGEN SATURATION: 97 % | HEIGHT: 63 IN | SYSTOLIC BLOOD PRESSURE: 130 MMHG | TEMPERATURE: 97.5 F | RESPIRATION RATE: 18 BRPM | DIASTOLIC BLOOD PRESSURE: 73 MMHG | WEIGHT: 160 LBS | BODY MASS INDEX: 28.35 KG/M2 | HEART RATE: 70 BPM

## 2022-06-30 DIAGNOSIS — M54.12 CERVICAL RADICULITIS: ICD-10-CM

## 2022-06-30 DIAGNOSIS — M48.062 SPINAL STENOSIS OF LUMBAR REGION WITH NEUROGENIC CLAUDICATION: ICD-10-CM

## 2022-06-30 PROCEDURE — 62305 MYELOGRAPHY LUMBAR INJECTION: CPT

## 2022-06-30 PROCEDURE — 62284 INJECTION FOR MYELOGRAM: CPT | Performed by: NEUROLOGICAL SURGERY

## 2022-06-30 PROCEDURE — 0 LIDOCAINE 1 % SOLUTION: Performed by: NEUROLOGICAL SURGERY

## 2022-06-30 PROCEDURE — 72114 X-RAY EXAM L-S SPINE BENDING: CPT

## 2022-06-30 PROCEDURE — 72132 CT LUMBAR SPINE W/DYE: CPT

## 2022-06-30 PROCEDURE — 25010000002 IOPAMIDOL 61 % SOLUTION: Performed by: NEUROLOGICAL SURGERY

## 2022-06-30 PROCEDURE — 72126 CT NECK SPINE W/DYE: CPT

## 2022-06-30 PROCEDURE — 72052 X-RAY EXAM NECK SPINE 6/>VWS: CPT

## 2022-06-30 PROCEDURE — 72240 MYELOGRAPHY NECK SPINE: CPT

## 2022-06-30 RX ORDER — HYDROCODONE BITARTRATE AND ACETAMINOPHEN 5; 325 MG/1; MG/1
1 TABLET ORAL EVERY 4 HOURS PRN
Status: DISCONTINUED | OUTPATIENT
Start: 2022-06-30 | End: 2022-07-01 | Stop reason: HOSPADM

## 2022-06-30 RX ORDER — LIDOCAINE HYDROCHLORIDE 10 MG/ML
10 INJECTION, SOLUTION INFILTRATION; PERINEURAL ONCE
Status: COMPLETED | OUTPATIENT
Start: 2022-06-30 | End: 2022-06-30

## 2022-06-30 RX ORDER — ACETAMINOPHEN 325 MG/1
650 TABLET ORAL EVERY 4 HOURS PRN
Status: DISCONTINUED | OUTPATIENT
Start: 2022-06-30 | End: 2022-07-01 | Stop reason: HOSPADM

## 2022-06-30 RX ADMIN — IOPAMIDOL 15 ML: 612 INJECTION, SOLUTION INTRATHECAL at 07:12

## 2022-06-30 RX ADMIN — LIDOCAINE HYDROCHLORIDE 5 ML: 10 INJECTION, SOLUTION INFILTRATION; PERINEURAL at 07:10

## 2022-06-30 RX ADMIN — HYDROCODONE BITARTRATE AND ACETAMINOPHEN 1 TABLET: 5; 325 TABLET ORAL at 07:50

## 2022-07-01 ENCOUNTER — TELEPHONE (OUTPATIENT)
Dept: INTERVENTIONAL RADIOLOGY/VASCULAR | Facility: HOSPITAL | Age: 67
End: 2022-07-01

## 2022-07-05 ENCOUNTER — PREP FOR SURGERY (OUTPATIENT)
Dept: OTHER | Facility: HOSPITAL | Age: 67
End: 2022-07-05

## 2022-07-05 ENCOUNTER — OFFICE VISIT (OUTPATIENT)
Dept: NEUROSURGERY | Facility: CLINIC | Age: 67
End: 2022-07-05

## 2022-07-05 VITALS
HEIGHT: 63 IN | SYSTOLIC BLOOD PRESSURE: 130 MMHG | TEMPERATURE: 97.5 F | OXYGEN SATURATION: 97 % | DIASTOLIC BLOOD PRESSURE: 76 MMHG | HEART RATE: 80 BPM | BODY MASS INDEX: 28.34 KG/M2

## 2022-07-05 DIAGNOSIS — M48.062 SPINAL STENOSIS OF LUMBAR REGION WITH NEUROGENIC CLAUDICATION: Primary | ICD-10-CM

## 2022-07-05 PROCEDURE — 99214 OFFICE O/P EST MOD 30 MIN: CPT | Performed by: NEUROLOGICAL SURGERY

## 2022-07-05 RX ORDER — CEFAZOLIN SODIUM 2 G/100ML
2 INJECTION, SOLUTION INTRAVENOUS ONCE
Status: CANCELLED | OUTPATIENT
Start: 2022-07-05 | End: 2022-07-05

## 2022-07-12 ENCOUNTER — TELEPHONE (OUTPATIENT)
Dept: ORTHOPEDIC SURGERY | Facility: CLINIC | Age: 67
End: 2022-07-12

## 2022-07-12 NOTE — TELEPHONE ENCOUNTER
FOR [facility]: PATIENT RETURNING CALL TO ANA 07/12/2022 ATTEMPTED TO WARM TRANSFER - PATIENT RETURNING CALL TO RN ANA/ PATIENT WAS ADVISED WOULD NOTIFY ANA AND SHE OR SOMEONE WOULD CALL HER BACK.

## 2022-07-21 ENCOUNTER — APPOINTMENT (OUTPATIENT)
Dept: GENERAL RADIOLOGY | Facility: HOSPITAL | Age: 67
End: 2022-07-21

## 2022-07-21 ENCOUNTER — HOSPITAL ENCOUNTER (EMERGENCY)
Facility: HOSPITAL | Age: 67
Discharge: ANOTHER HOSPITAL | End: 2022-07-22
Attending: EMERGENCY MEDICINE | Admitting: EMERGENCY MEDICINE

## 2022-07-21 DIAGNOSIS — Z96.649 PERI-PROSTHETIC FEMUR FRACTURE AT TIP OF PROSTHESIS, INITIAL ENCOUNTER: Primary | ICD-10-CM

## 2022-07-21 DIAGNOSIS — M97.8XXA PERI-PROSTHETIC FEMUR FRACTURE AT TIP OF PROSTHESIS, INITIAL ENCOUNTER: Primary | ICD-10-CM

## 2022-07-21 LAB
ALBUMIN SERPL-MCNC: 3.9 G/DL (ref 3.5–5.2)
ALBUMIN/GLOB SERPL: 1.1 G/DL
ALP SERPL-CCNC: 130 U/L (ref 39–117)
ALT SERPL W P-5'-P-CCNC: 22 U/L (ref 1–33)
ANION GAP SERPL CALCULATED.3IONS-SCNC: 10.9 MMOL/L (ref 5–15)
APTT PPP: 32.2 SECONDS (ref 22.7–35.4)
AST SERPL-CCNC: 18 U/L (ref 1–32)
BASOPHILS # BLD AUTO: 0.03 10*3/MM3 (ref 0–0.2)
BASOPHILS NFR BLD AUTO: 0.5 % (ref 0–1.5)
BILIRUB SERPL-MCNC: 0.3 MG/DL (ref 0–1.2)
BUN SERPL-MCNC: 16 MG/DL (ref 8–23)
BUN/CREAT SERPL: 15.4 (ref 7–25)
CALCIUM SPEC-SCNC: 9.1 MG/DL (ref 8.6–10.5)
CHLORIDE SERPL-SCNC: 104 MMOL/L (ref 98–107)
CO2 SERPL-SCNC: 23.1 MMOL/L (ref 22–29)
CREAT SERPL-MCNC: 1.04 MG/DL (ref 0.57–1)
DEPRECATED RDW RBC AUTO: 47.1 FL (ref 37–54)
EGFRCR SERPLBLD CKD-EPI 2021: 58.3 ML/MIN/1.73
EOSINOPHIL # BLD AUTO: 0.23 10*3/MM3 (ref 0–0.4)
EOSINOPHIL NFR BLD AUTO: 3.8 % (ref 0.3–6.2)
ERYTHROCYTE [DISTWIDTH] IN BLOOD BY AUTOMATED COUNT: 14.6 % (ref 12.3–15.4)
GLOBULIN UR ELPH-MCNC: 3.5 GM/DL
GLUCOSE SERPL-MCNC: 193 MG/DL (ref 65–99)
HCT VFR BLD AUTO: 39 % (ref 34–46.6)
HGB BLD-MCNC: 12.7 G/DL (ref 12–15.9)
IMM GRANULOCYTES # BLD AUTO: 0.02 10*3/MM3 (ref 0–0.05)
IMM GRANULOCYTES NFR BLD AUTO: 0.3 % (ref 0–0.5)
INR PPP: 0.97 (ref 0.9–1.1)
LYMPHOCYTES # BLD AUTO: 0.92 10*3/MM3 (ref 0.7–3.1)
LYMPHOCYTES NFR BLD AUTO: 15 % (ref 19.6–45.3)
MCH RBC QN AUTO: 28.9 PG (ref 26.6–33)
MCHC RBC AUTO-ENTMCNC: 32.6 G/DL (ref 31.5–35.7)
MCV RBC AUTO: 88.6 FL (ref 79–97)
MONOCYTES # BLD AUTO: 0.71 10*3/MM3 (ref 0.1–0.9)
MONOCYTES NFR BLD AUTO: 11.6 % (ref 5–12)
NEUTROPHILS NFR BLD AUTO: 4.22 10*3/MM3 (ref 1.7–7)
NEUTROPHILS NFR BLD AUTO: 68.8 % (ref 42.7–76)
NRBC BLD AUTO-RTO: 0 /100 WBC (ref 0–0.2)
PLATELET # BLD AUTO: 225 10*3/MM3 (ref 140–450)
PMV BLD AUTO: 12 FL (ref 6–12)
POTASSIUM SERPL-SCNC: 4.3 MMOL/L (ref 3.5–5.2)
PROT SERPL-MCNC: 7.4 G/DL (ref 6–8.5)
PROTHROMBIN TIME: 12.8 SECONDS (ref 11.7–14.2)
RBC # BLD AUTO: 4.4 10*6/MM3 (ref 3.77–5.28)
SARS-COV-2 RNA RESP QL NAA+PROBE: NOT DETECTED
SODIUM SERPL-SCNC: 138 MMOL/L (ref 136–145)
WBC NRBC COR # BLD: 6.13 10*3/MM3 (ref 3.4–10.8)

## 2022-07-21 PROCEDURE — 25010000002 MORPHINE PER 10 MG: Performed by: NURSE PRACTITIONER

## 2022-07-21 PROCEDURE — 80053 COMPREHEN METABOLIC PANEL: CPT | Performed by: EMERGENCY MEDICINE

## 2022-07-21 PROCEDURE — 96375 TX/PRO/DX INJ NEW DRUG ADDON: CPT

## 2022-07-21 PROCEDURE — 85730 THROMBOPLASTIN TIME PARTIAL: CPT | Performed by: EMERGENCY MEDICINE

## 2022-07-21 PROCEDURE — 25010000002 HYDROMORPHONE 1 MG/ML SOLUTION: Performed by: NURSE PRACTITIONER

## 2022-07-21 PROCEDURE — 96376 TX/PRO/DX INJ SAME DRUG ADON: CPT

## 2022-07-21 PROCEDURE — 99285 EMERGENCY DEPT VISIT HI MDM: CPT

## 2022-07-21 PROCEDURE — 71045 X-RAY EXAM CHEST 1 VIEW: CPT

## 2022-07-21 PROCEDURE — 73552 X-RAY EXAM OF FEMUR 2/>: CPT

## 2022-07-21 PROCEDURE — 96374 THER/PROPH/DIAG INJ IV PUSH: CPT

## 2022-07-21 PROCEDURE — 85610 PROTHROMBIN TIME: CPT | Performed by: EMERGENCY MEDICINE

## 2022-07-21 PROCEDURE — U0005 INFEC AGEN DETEC AMPLI PROBE: HCPCS | Performed by: EMERGENCY MEDICINE

## 2022-07-21 PROCEDURE — 36415 COLL VENOUS BLD VENIPUNCTURE: CPT

## 2022-07-21 PROCEDURE — U0003 INFECTIOUS AGENT DETECTION BY NUCLEIC ACID (DNA OR RNA); SEVERE ACUTE RESPIRATORY SYNDROME CORONAVIRUS 2 (SARS-COV-2) (CORONAVIRUS DISEASE [COVID-19]), AMPLIFIED PROBE TECHNIQUE, MAKING USE OF HIGH THROUGHPUT TECHNOLOGIES AS DESCRIBED BY CMS-2020-01-R: HCPCS | Performed by: EMERGENCY MEDICINE

## 2022-07-21 PROCEDURE — 25010000002 ONDANSETRON PER 1 MG: Performed by: NURSE PRACTITIONER

## 2022-07-21 PROCEDURE — 85025 COMPLETE CBC W/AUTO DIFF WBC: CPT | Performed by: EMERGENCY MEDICINE

## 2022-07-21 RX ORDER — OXYCODONE AND ACETAMINOPHEN 10; 325 MG/1; MG/1
1 TABLET ORAL ONCE
Status: COMPLETED | OUTPATIENT
Start: 2022-07-21 | End: 2022-07-21

## 2022-07-21 RX ORDER — MORPHINE SULFATE 2 MG/ML
4 INJECTION, SOLUTION INTRAMUSCULAR; INTRAVENOUS ONCE
Status: COMPLETED | OUTPATIENT
Start: 2022-07-21 | End: 2022-07-21

## 2022-07-21 RX ORDER — ONDANSETRON 2 MG/ML
4 INJECTION INTRAMUSCULAR; INTRAVENOUS ONCE
Status: COMPLETED | OUTPATIENT
Start: 2022-07-21 | End: 2022-07-21

## 2022-07-21 RX ADMIN — MORPHINE SULFATE 4 MG: 2 INJECTION, SOLUTION INTRAMUSCULAR; INTRAVENOUS at 20:17

## 2022-07-21 RX ADMIN — ONDANSETRON 4 MG: 2 INJECTION INTRAMUSCULAR; INTRAVENOUS at 20:15

## 2022-07-21 RX ADMIN — OXYCODONE AND ACETAMINOPHEN 1 TABLET: 325; 10 TABLET ORAL at 21:58

## 2022-07-21 RX ADMIN — MORPHINE SULFATE 4 MG: 2 INJECTION, SOLUTION INTRAMUSCULAR; INTRAVENOUS at 21:05

## 2022-07-21 RX ADMIN — HYDROMORPHONE HYDROCHLORIDE 1 MG: 1 INJECTION, SOLUTION INTRAMUSCULAR; INTRAVENOUS; SUBCUTANEOUS at 23:25

## 2022-07-22 VITALS
OXYGEN SATURATION: 97 % | BODY MASS INDEX: 29.2 KG/M2 | HEIGHT: 63 IN | RESPIRATION RATE: 16 BRPM | TEMPERATURE: 97.7 F | WEIGHT: 164.8 LBS | DIASTOLIC BLOOD PRESSURE: 74 MMHG | HEART RATE: 80 BPM | SYSTOLIC BLOOD PRESSURE: 112 MMHG

## 2022-12-01 ENCOUNTER — TELEPHONE (OUTPATIENT)
Dept: NEUROSURGERY | Facility: CLINIC | Age: 67
End: 2022-12-01

## 2022-12-01 DIAGNOSIS — M48.062 SPINAL STENOSIS OF LUMBAR REGION WITH NEUROGENIC CLAUDICATION: Primary | ICD-10-CM

## 2023-01-30 ENCOUNTER — HOSPITAL ENCOUNTER (INPATIENT)
Facility: HOSPITAL | Age: 68
LOS: 7 days | Discharge: HOME HEALTH | DRG: 552 | End: 2023-02-07
Attending: EMERGENCY MEDICINE | Admitting: HOSPITALIST
Payer: MEDICARE

## 2023-01-30 ENCOUNTER — APPOINTMENT (OUTPATIENT)
Dept: GENERAL RADIOLOGY | Facility: HOSPITAL | Age: 68
DRG: 552 | End: 2023-01-30
Payer: MEDICARE

## 2023-01-30 DIAGNOSIS — M54.50 ACUTE LOW BACK PAIN, UNSPECIFIED BACK PAIN LATERALITY, UNSPECIFIED WHETHER SCIATICA PRESENT: ICD-10-CM

## 2023-01-30 DIAGNOSIS — R52 UNCONTROLLED PAIN: Primary | ICD-10-CM

## 2023-01-30 DIAGNOSIS — R52 PAIN: ICD-10-CM

## 2023-01-30 DIAGNOSIS — G89.29 CHRONIC MIDLINE LOW BACK PAIN WITH LEFT-SIDED SCIATICA: ICD-10-CM

## 2023-01-30 DIAGNOSIS — G89.4 CHRONIC PAIN DISORDER: ICD-10-CM

## 2023-01-30 DIAGNOSIS — M51.36 DEGENERATION OF LUMBAR INTERVERTEBRAL DISC: ICD-10-CM

## 2023-01-30 DIAGNOSIS — I10 ESSENTIAL HYPERTENSION: ICD-10-CM

## 2023-01-30 DIAGNOSIS — M54.42 CHRONIC MIDLINE LOW BACK PAIN WITH LEFT-SIDED SCIATICA: ICD-10-CM

## 2023-01-30 PROBLEM — Z79.4 ENCOUNTER FOR LONG-TERM (CURRENT) USE OF INSULIN (HCC): Status: ACTIVE | Noted: 2023-01-30

## 2023-01-30 PROBLEM — M54.30 SCIATICA: Status: ACTIVE | Noted: 2023-01-30

## 2023-01-30 PROBLEM — M19.90 OSTEOARTHRITIS: Status: ACTIVE | Noted: 2023-01-30

## 2023-01-30 PROBLEM — M51.369 DEGENERATION OF LUMBAR INTERVERTEBRAL DISC: Status: ACTIVE | Noted: 2023-01-30

## 2023-01-30 PROBLEM — M54.59 LUMBAR TRIGGER POINT SYNDROME: Status: ACTIVE | Noted: 2022-12-29

## 2023-01-30 LAB
ANION GAP SERPL CALCULATED.3IONS-SCNC: 12 MMOL/L (ref 5–15)
BACTERIA UR QL AUTO: ABNORMAL /HPF
BASOPHILS # BLD AUTO: 0.06 10*3/MM3 (ref 0–0.2)
BASOPHILS NFR BLD AUTO: 0.7 % (ref 0–1.5)
BILIRUB UR QL STRIP: NEGATIVE
BUN SERPL-MCNC: 18 MG/DL (ref 8–23)
BUN/CREAT SERPL: 21.4 (ref 7–25)
CALCIUM SPEC-SCNC: 9.7 MG/DL (ref 8.6–10.5)
CHLORIDE SERPL-SCNC: 99 MMOL/L (ref 98–107)
CLARITY UR: CLEAR
CO2 SERPL-SCNC: 25 MMOL/L (ref 22–29)
COLOR UR: YELLOW
CREAT SERPL-MCNC: 0.84 MG/DL (ref 0.57–1)
DEPRECATED RDW RBC AUTO: 44.6 FL (ref 37–54)
EGFRCR SERPLBLD CKD-EPI 2021: 74.9 ML/MIN/1.73
EOSINOPHIL # BLD AUTO: 0.06 10*3/MM3 (ref 0–0.4)
EOSINOPHIL NFR BLD AUTO: 0.7 % (ref 0.3–6.2)
ERYTHROCYTE [DISTWIDTH] IN BLOOD BY AUTOMATED COUNT: 14 % (ref 12.3–15.4)
GLUCOSE SERPL-MCNC: 155 MG/DL (ref 65–99)
GLUCOSE UR STRIP-MCNC: NEGATIVE MG/DL
HCT VFR BLD AUTO: 44.3 % (ref 34–46.6)
HGB BLD-MCNC: 14.1 G/DL (ref 12–15.9)
HGB UR QL STRIP.AUTO: NEGATIVE
HYALINE CASTS UR QL AUTO: ABNORMAL /LPF
IMM GRANULOCYTES # BLD AUTO: 0.02 10*3/MM3 (ref 0–0.05)
IMM GRANULOCYTES NFR BLD AUTO: 0.2 % (ref 0–0.5)
KETONES UR QL STRIP: ABNORMAL
LEUKOCYTE ESTERASE UR QL STRIP.AUTO: NEGATIVE
LYMPHOCYTES # BLD AUTO: 0.78 10*3/MM3 (ref 0.7–3.1)
LYMPHOCYTES NFR BLD AUTO: 9.7 % (ref 19.6–45.3)
MCH RBC QN AUTO: 27.8 PG (ref 26.6–33)
MCHC RBC AUTO-ENTMCNC: 31.8 G/DL (ref 31.5–35.7)
MCV RBC AUTO: 87.2 FL (ref 79–97)
MONOCYTES # BLD AUTO: 0.56 10*3/MM3 (ref 0.1–0.9)
MONOCYTES NFR BLD AUTO: 6.9 % (ref 5–12)
NEUTROPHILS NFR BLD AUTO: 6.6 10*3/MM3 (ref 1.7–7)
NEUTROPHILS NFR BLD AUTO: 81.8 % (ref 42.7–76)
NITRITE UR QL STRIP: NEGATIVE
NRBC BLD AUTO-RTO: 0 /100 WBC (ref 0–0.2)
PH UR STRIP.AUTO: 5.5 [PH] (ref 5–8)
PLATELET # BLD AUTO: 222 10*3/MM3 (ref 140–450)
PMV BLD AUTO: 11.2 FL (ref 6–12)
POTASSIUM SERPL-SCNC: 3.8 MMOL/L (ref 3.5–5.2)
PROT UR QL STRIP: ABNORMAL
RBC # BLD AUTO: 5.08 10*6/MM3 (ref 3.77–5.28)
RBC # UR STRIP: ABNORMAL /HPF
REF LAB TEST METHOD: ABNORMAL
SODIUM SERPL-SCNC: 136 MMOL/L (ref 136–145)
SP GR UR STRIP: 1.03 (ref 1–1.03)
SQUAMOUS #/AREA URNS HPF: ABNORMAL /HPF
UROBILINOGEN UR QL STRIP: ABNORMAL
WBC # UR STRIP: ABNORMAL /HPF
WBC NRBC COR # BLD: 8.08 10*3/MM3 (ref 3.4–10.8)

## 2023-01-30 PROCEDURE — G0378 HOSPITAL OBSERVATION PER HR: HCPCS

## 2023-01-30 PROCEDURE — 99284 EMERGENCY DEPT VISIT MOD MDM: CPT

## 2023-01-30 PROCEDURE — 80048 BASIC METABOLIC PNL TOTAL CA: CPT | Performed by: EMERGENCY MEDICINE

## 2023-01-30 PROCEDURE — 85025 COMPLETE CBC W/AUTO DIFF WBC: CPT | Performed by: EMERGENCY MEDICINE

## 2023-01-30 PROCEDURE — 72110 X-RAY EXAM L-2 SPINE 4/>VWS: CPT

## 2023-01-30 PROCEDURE — 25010000002 ONDANSETRON PER 1 MG: Performed by: EMERGENCY MEDICINE

## 2023-01-30 PROCEDURE — 25010000002 HYDROMORPHONE PER 4 MG: Performed by: EMERGENCY MEDICINE

## 2023-01-30 PROCEDURE — 99285 EMERGENCY DEPT VISIT HI MDM: CPT

## 2023-01-30 PROCEDURE — 25010000002 HYDROMORPHONE 1 MG/ML SOLUTION: Performed by: EMERGENCY MEDICINE

## 2023-01-30 PROCEDURE — 81001 URINALYSIS AUTO W/SCOPE: CPT | Performed by: EMERGENCY MEDICINE

## 2023-01-30 RX ORDER — CHOLECALCIFEROL (VITAMIN D3) 125 MCG
5 CAPSULE ORAL NIGHTLY PRN
Status: DISCONTINUED | OUTPATIENT
Start: 2023-01-30 | End: 2023-02-07 | Stop reason: HOSPADM

## 2023-01-30 RX ORDER — SODIUM CHLORIDE 9 MG/ML
40 INJECTION, SOLUTION INTRAVENOUS AS NEEDED
Status: DISCONTINUED | OUTPATIENT
Start: 2023-01-30 | End: 2023-02-07 | Stop reason: HOSPADM

## 2023-01-30 RX ORDER — HYDROMORPHONE HYDROCHLORIDE 1 MG/ML
0.5 INJECTION, SOLUTION INTRAMUSCULAR; INTRAVENOUS; SUBCUTANEOUS
Status: COMPLETED | OUTPATIENT
Start: 2023-01-30 | End: 2023-01-31

## 2023-01-30 RX ORDER — HYDROMORPHONE HYDROCHLORIDE 1 MG/ML
0.5 INJECTION, SOLUTION INTRAMUSCULAR; INTRAVENOUS; SUBCUTANEOUS ONCE
Status: COMPLETED | OUTPATIENT
Start: 2023-01-30 | End: 2023-01-30

## 2023-01-30 RX ORDER — OXYCODONE AND ACETAMINOPHEN 7.5; 325 MG/1; MG/1
1 TABLET ORAL EVERY 4 HOURS PRN
Status: DISCONTINUED | OUTPATIENT
Start: 2023-01-30 | End: 2023-02-07 | Stop reason: HOSPADM

## 2023-01-30 RX ORDER — ACETAMINOPHEN 325 MG/1
650 TABLET ORAL EVERY 4 HOURS PRN
Status: DISCONTINUED | OUTPATIENT
Start: 2023-01-30 | End: 2023-02-07 | Stop reason: HOSPADM

## 2023-01-30 RX ORDER — SODIUM CHLORIDE 0.9 % (FLUSH) 0.9 %
10 SYRINGE (ML) INJECTION AS NEEDED
Status: DISCONTINUED | OUTPATIENT
Start: 2023-01-30 | End: 2023-02-07 | Stop reason: HOSPADM

## 2023-01-30 RX ORDER — DEXTROSE MONOHYDRATE 25 G/50ML
25 INJECTION, SOLUTION INTRAVENOUS
Status: DISCONTINUED | OUTPATIENT
Start: 2023-01-30 | End: 2023-02-07 | Stop reason: HOSPADM

## 2023-01-30 RX ORDER — SODIUM CHLORIDE 0.9 % (FLUSH) 0.9 %
10 SYRINGE (ML) INJECTION EVERY 12 HOURS SCHEDULED
Status: DISCONTINUED | OUTPATIENT
Start: 2023-01-30 | End: 2023-02-07 | Stop reason: HOSPADM

## 2023-01-30 RX ORDER — INSULIN LISPRO 100 [IU]/ML
0-9 INJECTION, SOLUTION INTRAVENOUS; SUBCUTANEOUS
Status: DISCONTINUED | OUTPATIENT
Start: 2023-01-31 | End: 2023-02-07 | Stop reason: HOSPADM

## 2023-01-30 RX ORDER — ONDANSETRON 2 MG/ML
4 INJECTION INTRAMUSCULAR; INTRAVENOUS EVERY 6 HOURS PRN
Status: DISCONTINUED | OUTPATIENT
Start: 2023-01-30 | End: 2023-02-07 | Stop reason: HOSPADM

## 2023-01-30 RX ORDER — NICOTINE POLACRILEX 4 MG
15 LOZENGE BUCCAL
Status: DISCONTINUED | OUTPATIENT
Start: 2023-01-30 | End: 2023-02-07 | Stop reason: HOSPADM

## 2023-01-30 RX ORDER — ONDANSETRON 2 MG/ML
4 INJECTION INTRAMUSCULAR; INTRAVENOUS ONCE
Status: COMPLETED | OUTPATIENT
Start: 2023-01-30 | End: 2023-01-30

## 2023-01-30 RX ADMIN — HYDROMORPHONE HYDROCHLORIDE 0.5 MG: 1 INJECTION, SOLUTION INTRAMUSCULAR; INTRAVENOUS; SUBCUTANEOUS at 17:26

## 2023-01-30 RX ADMIN — HYDROMORPHONE HYDROCHLORIDE 0.5 MG: 1 INJECTION, SOLUTION INTRAMUSCULAR; INTRAVENOUS; SUBCUTANEOUS at 18:16

## 2023-01-30 RX ADMIN — ONDANSETRON 4 MG: 2 INJECTION INTRAMUSCULAR; INTRAVENOUS at 17:14

## 2023-01-30 RX ADMIN — HYDROMORPHONE HYDROCHLORIDE 1 MG: 1 INJECTION, SOLUTION INTRAMUSCULAR; INTRAVENOUS; SUBCUTANEOUS at 20:08

## 2023-01-31 PROBLEM — M54.50 LOW BACK PAIN: Status: ACTIVE | Noted: 2023-01-31

## 2023-01-31 PROBLEM — M54.9 BACK PAIN: Status: ACTIVE | Noted: 2023-01-31

## 2023-01-31 LAB
ANION GAP SERPL CALCULATED.3IONS-SCNC: 13.6 MMOL/L (ref 5–15)
BASOPHILS # BLD AUTO: 0.05 10*3/MM3 (ref 0–0.2)
BASOPHILS NFR BLD AUTO: 0.9 % (ref 0–1.5)
BUN SERPL-MCNC: 22 MG/DL (ref 8–23)
BUN/CREAT SERPL: 23.9 (ref 7–25)
CALCIUM SPEC-SCNC: 9.4 MG/DL (ref 8.6–10.5)
CHLORIDE SERPL-SCNC: 101 MMOL/L (ref 98–107)
CO2 SERPL-SCNC: 20.4 MMOL/L (ref 22–29)
CREAT SERPL-MCNC: 0.92 MG/DL (ref 0.57–1)
DEPRECATED RDW RBC AUTO: 43.6 FL (ref 37–54)
EGFRCR SERPLBLD CKD-EPI 2021: 67.1 ML/MIN/1.73
EOSINOPHIL # BLD AUTO: 0.08 10*3/MM3 (ref 0–0.4)
EOSINOPHIL NFR BLD AUTO: 1.5 % (ref 0.3–6.2)
ERYTHROCYTE [DISTWIDTH] IN BLOOD BY AUTOMATED COUNT: 13.7 % (ref 12.3–15.4)
GLUCOSE BLDC GLUCOMTR-MCNC: 124 MG/DL (ref 70–130)
GLUCOSE BLDC GLUCOMTR-MCNC: 125 MG/DL (ref 70–130)
GLUCOSE BLDC GLUCOMTR-MCNC: 160 MG/DL (ref 70–130)
GLUCOSE SERPL-MCNC: 165 MG/DL (ref 65–99)
HCT VFR BLD AUTO: 41.9 % (ref 34–46.6)
HGB BLD-MCNC: 13.3 G/DL (ref 12–15.9)
IMM GRANULOCYTES # BLD AUTO: 0.02 10*3/MM3 (ref 0–0.05)
IMM GRANULOCYTES NFR BLD AUTO: 0.4 % (ref 0–0.5)
LYMPHOCYTES # BLD AUTO: 0.97 10*3/MM3 (ref 0.7–3.1)
LYMPHOCYTES NFR BLD AUTO: 18 % (ref 19.6–45.3)
MCH RBC QN AUTO: 27.6 PG (ref 26.6–33)
MCHC RBC AUTO-ENTMCNC: 31.7 G/DL (ref 31.5–35.7)
MCV RBC AUTO: 86.9 FL (ref 79–97)
MONOCYTES # BLD AUTO: 0.63 10*3/MM3 (ref 0.1–0.9)
MONOCYTES NFR BLD AUTO: 11.7 % (ref 5–12)
NEUTROPHILS NFR BLD AUTO: 3.64 10*3/MM3 (ref 1.7–7)
NEUTROPHILS NFR BLD AUTO: 67.5 % (ref 42.7–76)
NRBC BLD AUTO-RTO: 0 /100 WBC (ref 0–0.2)
PLATELET # BLD AUTO: 189 10*3/MM3 (ref 140–450)
PMV BLD AUTO: 11.2 FL (ref 6–12)
POTASSIUM SERPL-SCNC: 3.8 MMOL/L (ref 3.5–5.2)
RBC # BLD AUTO: 4.82 10*6/MM3 (ref 3.77–5.28)
SODIUM SERPL-SCNC: 135 MMOL/L (ref 136–145)
WBC NRBC COR # BLD: 5.39 10*3/MM3 (ref 3.4–10.8)

## 2023-01-31 PROCEDURE — 82962 GLUCOSE BLOOD TEST: CPT

## 2023-01-31 PROCEDURE — 80048 BASIC METABOLIC PNL TOTAL CA: CPT | Performed by: HOSPITALIST

## 2023-01-31 PROCEDURE — 25010000002 ONDANSETRON PER 1 MG: Performed by: HOSPITALIST

## 2023-01-31 PROCEDURE — 25010000002 HYDROMORPHONE PER 4 MG: Performed by: HOSPITALIST

## 2023-01-31 PROCEDURE — 36415 COLL VENOUS BLD VENIPUNCTURE: CPT | Performed by: HOSPITALIST

## 2023-01-31 PROCEDURE — 85025 COMPLETE CBC W/AUTO DIFF WBC: CPT | Performed by: HOSPITALIST

## 2023-01-31 PROCEDURE — 99222 1ST HOSP IP/OBS MODERATE 55: CPT | Performed by: NURSE PRACTITIONER

## 2023-01-31 PROCEDURE — 25010000002 HYDROMORPHONE PER 4 MG: Performed by: NURSE PRACTITIONER

## 2023-01-31 RX ORDER — PRAVASTATIN SODIUM 10 MG
10 TABLET ORAL NIGHTLY
Status: DISCONTINUED | OUTPATIENT
Start: 2023-01-31 | End: 2023-02-07 | Stop reason: HOSPADM

## 2023-01-31 RX ORDER — PANTOPRAZOLE SODIUM 40 MG/1
40 TABLET, DELAYED RELEASE ORAL
Status: DISCONTINUED | OUTPATIENT
Start: 2023-01-31 | End: 2023-02-07 | Stop reason: HOSPADM

## 2023-01-31 RX ORDER — DIAZEPAM 5 MG/1
5 TABLET ORAL ONCE
Status: DISCONTINUED | OUTPATIENT
Start: 2023-01-31 | End: 2023-01-31

## 2023-01-31 RX ORDER — CYCLOBENZAPRINE HCL 10 MG
10 TABLET ORAL 3 TIMES DAILY PRN
Status: DISCONTINUED | OUTPATIENT
Start: 2023-01-31 | End: 2023-02-07 | Stop reason: HOSPADM

## 2023-01-31 RX ORDER — HYDROMORPHONE HYDROCHLORIDE 1 MG/ML
0.5 INJECTION, SOLUTION INTRAMUSCULAR; INTRAVENOUS; SUBCUTANEOUS EVERY 4 HOURS PRN
Status: DISCONTINUED | OUTPATIENT
Start: 2023-01-31 | End: 2023-01-31

## 2023-01-31 RX ORDER — DIAZEPAM 5 MG/1
5 TABLET ORAL ONCE
Status: COMPLETED | OUTPATIENT
Start: 2023-01-31 | End: 2023-01-31

## 2023-01-31 RX ORDER — HYDROMORPHONE HYDROCHLORIDE 1 MG/ML
0.5 INJECTION, SOLUTION INTRAMUSCULAR; INTRAVENOUS; SUBCUTANEOUS
Status: DISCONTINUED | OUTPATIENT
Start: 2023-01-31 | End: 2023-02-07 | Stop reason: HOSPADM

## 2023-01-31 RX ORDER — LISINOPRIL 5 MG/1
5 TABLET ORAL EVERY MORNING
Status: DISCONTINUED | OUTPATIENT
Start: 2023-01-31 | End: 2023-02-05

## 2023-01-31 RX ORDER — ROPINIROLE 2 MG/1
4 TABLET, FILM COATED ORAL NIGHTLY
Status: DISCONTINUED | OUTPATIENT
Start: 2023-01-31 | End: 2023-02-07 | Stop reason: HOSPADM

## 2023-01-31 RX ORDER — DIAZEPAM 5 MG/1
10 TABLET ORAL ONCE
Status: COMPLETED | OUTPATIENT
Start: 2023-01-31 | End: 2023-01-31

## 2023-01-31 RX ORDER — OXYCODONE HYDROCHLORIDE AND ACETAMINOPHEN 5; 325 MG/1; MG/1
1 TABLET ORAL EVERY 8 HOURS PRN
Status: DISCONTINUED | OUTPATIENT
Start: 2023-01-31 | End: 2023-02-07 | Stop reason: HOSPADM

## 2023-01-31 RX ORDER — GABAPENTIN 300 MG/1
300 CAPSULE ORAL 3 TIMES DAILY
Status: DISCONTINUED | OUTPATIENT
Start: 2023-01-31 | End: 2023-02-03

## 2023-01-31 RX ADMIN — Medication 10 ML: at 00:09

## 2023-01-31 RX ADMIN — DIAZEPAM 10 MG: 5 TABLET ORAL at 23:38

## 2023-01-31 RX ADMIN — GABAPENTIN 300 MG: 300 CAPSULE ORAL at 20:23

## 2023-01-31 RX ADMIN — HYDROMORPHONE HYDROCHLORIDE 0.5 MG: 1 INJECTION, SOLUTION INTRAMUSCULAR; INTRAVENOUS; SUBCUTANEOUS at 00:08

## 2023-01-31 RX ADMIN — HYDROMORPHONE HYDROCHLORIDE 0.5 MG: 1 INJECTION, SOLUTION INTRAMUSCULAR; INTRAVENOUS; SUBCUTANEOUS at 16:17

## 2023-01-31 RX ADMIN — METOPROLOL SUCCINATE 75 MG: 25 TABLET, EXTENDED RELEASE ORAL at 20:23

## 2023-01-31 RX ADMIN — GABAPENTIN 300 MG: 300 CAPSULE ORAL at 17:51

## 2023-01-31 RX ADMIN — ONDANSETRON 4 MG: 2 INJECTION INTRAMUSCULAR; INTRAVENOUS at 11:34

## 2023-01-31 RX ADMIN — HYDROMORPHONE HYDROCHLORIDE 0.5 MG: 1 INJECTION, SOLUTION INTRAMUSCULAR; INTRAVENOUS; SUBCUTANEOUS at 04:40

## 2023-01-31 RX ADMIN — ONDANSETRON 4 MG: 2 INJECTION INTRAMUSCULAR; INTRAVENOUS at 00:20

## 2023-01-31 RX ADMIN — OXYCODONE HYDROCHLORIDE AND ACETAMINOPHEN 1 TABLET: 7.5; 325 TABLET ORAL at 17:51

## 2023-01-31 RX ADMIN — ROPINIROLE 4 MG: 2 TABLET, FILM COATED ORAL at 20:23

## 2023-01-31 RX ADMIN — HYDROMORPHONE HYDROCHLORIDE 0.5 MG: 1 INJECTION, SOLUTION INTRAMUSCULAR; INTRAVENOUS; SUBCUTANEOUS at 18:58

## 2023-01-31 RX ADMIN — GABAPENTIN 300 MG: 300 CAPSULE ORAL at 10:05

## 2023-01-31 RX ADMIN — PRAVASTATIN SODIUM 10 MG: 10 TABLET ORAL at 20:23

## 2023-01-31 RX ADMIN — HYDROMORPHONE HYDROCHLORIDE 0.5 MG: 1 INJECTION, SOLUTION INTRAMUSCULAR; INTRAVENOUS; SUBCUTANEOUS at 11:25

## 2023-01-31 RX ADMIN — Medication 10 ML: at 10:18

## 2023-01-31 RX ADMIN — HYDROMORPHONE HYDROCHLORIDE 0.5 MG: 1 INJECTION, SOLUTION INTRAMUSCULAR; INTRAVENOUS; SUBCUTANEOUS at 13:05

## 2023-01-31 RX ADMIN — OXYCODONE HYDROCHLORIDE AND ACETAMINOPHEN 1 TABLET: 7.5; 325 TABLET ORAL at 10:12

## 2023-01-31 RX ADMIN — HYDROMORPHONE HYDROCHLORIDE 0.5 MG: 1 INJECTION, SOLUTION INTRAMUSCULAR; INTRAVENOUS; SUBCUTANEOUS at 02:34

## 2023-02-01 ENCOUNTER — APPOINTMENT (OUTPATIENT)
Dept: MRI IMAGING | Facility: HOSPITAL | Age: 68
DRG: 552 | End: 2023-02-01
Payer: MEDICARE

## 2023-02-01 PROBLEM — M48.062 SPINAL STENOSIS, LUMBAR REGION, WITH NEUROGENIC CLAUDICATION: Status: ACTIVE | Noted: 2017-05-09

## 2023-02-01 LAB
GLUCOSE BLDC GLUCOMTR-MCNC: 121 MG/DL (ref 70–130)
GLUCOSE BLDC GLUCOMTR-MCNC: 163 MG/DL (ref 70–130)
GLUCOSE BLDC GLUCOMTR-MCNC: 169 MG/DL (ref 70–130)

## 2023-02-01 PROCEDURE — 72148 MRI LUMBAR SPINE W/O DYE: CPT

## 2023-02-01 PROCEDURE — 25010000002 HYDROMORPHONE PER 4 MG: Performed by: HOSPITALIST

## 2023-02-01 PROCEDURE — 82962 GLUCOSE BLOOD TEST: CPT

## 2023-02-01 PROCEDURE — 0 HYDROCORTISONE SOD SUC (PF) 250 MG RECONSTITUTED SOLUTION: Performed by: NURSE PRACTITIONER

## 2023-02-01 PROCEDURE — 25010000002 ONDANSETRON PER 1 MG: Performed by: HOSPITALIST

## 2023-02-01 PROCEDURE — 99232 SBSQ HOSP IP/OBS MODERATE 35: CPT | Performed by: NURSE PRACTITIONER

## 2023-02-01 RX ADMIN — OXYCODONE HYDROCHLORIDE AND ACETAMINOPHEN 1 TABLET: 7.5; 325 TABLET ORAL at 04:44

## 2023-02-01 RX ADMIN — ONDANSETRON 4 MG: 2 INJECTION INTRAMUSCULAR; INTRAVENOUS at 21:40

## 2023-02-01 RX ADMIN — Medication 10 ML: at 21:39

## 2023-02-01 RX ADMIN — GABAPENTIN 300 MG: 300 CAPSULE ORAL at 16:17

## 2023-02-01 RX ADMIN — HYDROMORPHONE HYDROCHLORIDE 0.5 MG: 1 INJECTION, SOLUTION INTRAMUSCULAR; INTRAVENOUS; SUBCUTANEOUS at 14:04

## 2023-02-01 RX ADMIN — HYDROMORPHONE HYDROCHLORIDE 0.5 MG: 1 INJECTION, SOLUTION INTRAMUSCULAR; INTRAVENOUS; SUBCUTANEOUS at 11:15

## 2023-02-01 RX ADMIN — ROPINIROLE 4 MG: 2 TABLET, FILM COATED ORAL at 21:32

## 2023-02-01 RX ADMIN — HYDROMORPHONE HYDROCHLORIDE 0.5 MG: 1 INJECTION, SOLUTION INTRAMUSCULAR; INTRAVENOUS; SUBCUTANEOUS at 16:17

## 2023-02-01 RX ADMIN — HYDROCORTISONE SODIUM SUCCINATE 250 MG: 250 INJECTION, POWDER, FOR SOLUTION INTRAMUSCULAR; INTRAVENOUS at 19:51

## 2023-02-01 RX ADMIN — PRAVASTATIN SODIUM 10 MG: 10 TABLET ORAL at 21:32

## 2023-02-01 RX ADMIN — OXYCODONE HYDROCHLORIDE AND ACETAMINOPHEN 1 TABLET: 7.5; 325 TABLET ORAL at 21:33

## 2023-02-01 RX ADMIN — PANTOPRAZOLE SODIUM 40 MG: 40 TABLET, DELAYED RELEASE ORAL at 04:37

## 2023-02-01 RX ADMIN — GABAPENTIN 300 MG: 300 CAPSULE ORAL at 21:31

## 2023-02-01 RX ADMIN — Medication 10 ML: at 09:15

## 2023-02-01 RX ADMIN — HYDROMORPHONE HYDROCHLORIDE 0.5 MG: 1 INJECTION, SOLUTION INTRAMUSCULAR; INTRAVENOUS; SUBCUTANEOUS at 09:08

## 2023-02-01 RX ADMIN — OXYCODONE HYDROCHLORIDE AND ACETAMINOPHEN 1 TABLET: 7.5; 325 TABLET ORAL at 17:38

## 2023-02-01 RX ADMIN — METOPROLOL SUCCINATE 75 MG: 25 TABLET, EXTENDED RELEASE ORAL at 21:33

## 2023-02-01 NOTE — CASE MANAGEMENT/SOCIAL WORK
Discharge Planning Assessment  [facility]     Patient Name: Kathy Ramirez  MRN: [id number]  Today's Date: 2/1/2023    Admit Date: 1/30/2023    Plan: pending clinical course   Discharge Needs Assessment     Row Name 02/01/23 1143       Living Environment    People in Home spouse    Current Living Arrangements home    Primary Care Provided by self    Provides Primary Care For no one    Family Caregiver if Needed spouse    Quality of Family Relationships helpful;involved;supportive       Resource/Environmental Concerns    Resource/Environmental Concerns none       Transition Planning    Patient/Family Anticipates Transition to home with help/services;inpatient rehabilitation facility    Patient/Family Anticipated Services at Transition rehabilitation services;home health care    Transportation Anticipated family or friend will provide;health plan transportation       Discharge Needs Assessment    Readmission Within the Last 30 Days no previous admission in last 30 days    Equipment Currently Used at Home bath bench;walker, lesley;commode;wheelchair;grab bar    Concerns to be Addressed discharge planning               Discharge Plan     Row Name 02/01/23 1144       Plan    Plan pending clinical course    Patient/Family in Agreement with Plan yes    Plan Comments CCP met with patient and her spouse at the bedside. CCP role explained and facesheet verified. The patient lives with her spouse. She uses a lesley walker, W/C, grab bars, bath bench and BSC at home. She has a ramp that can be installed if needed, otherwise there are 2 BERRY. She has used [facility] and has been to [facility] acute rehab in the past. She would use both again if needed. Referrals placed to follow [facility] clinical course and PT eval. Patient may also be interested in [facility] if acute rehab is indicated. CCP noted patient awaiting [facility] plans and will continue to follow to assist in discharge planning. Natasha RESENDIZ RN CCP              Continued Care  and Services - Admitted Since 1/30/2023     Destination     Service Provider Request Status Selected Services Address Phone Fax Patient Preferred    [facility] Pending - Request Sent N/A [address] [facility] [address] [phone] --          Home Medical Care     Service Provider Request Status Selected Services Address Phone Fax Patient Preferred    [facility] N[facility] [address] [facility] [id number] [phone] --    [facility] [address] [address] [phone] --              Expected Discharge Date and Time     Expected Discharge Date Expected Discharge Time    Feb 3, 2023          Demographic Summary     Row Name 02/01/23 1142       General Information    Arrived From home    Preferred Language English               Functional Status     Row Name 02/01/23 1143       Functional Status    Usual Activity Tolerance good       Functional Status, IADL    Medications independent    Meal Preparation independent    Housekeeping assistive equipment    Laundry assistive equipment    Shopping assistive equipment               Psychosocial    No documentation.                Abuse/Neglect    No documentation.                Legal    No documentation.                Substance Abuse    No documentation.                Patient Forms    No documentation.                   Natasha Agee RN

## 2023-02-01 NOTE — PROGRESS NOTES
Name: Kathy Ramirez ADMIT: 2023   : 1955  PCP: Lucy Turcios APRN    MRN: [id number] LOS: 1 days   AGE/SEX: 68 y.o. female  ROOM: [facility]     Subjective   Subjective   Pain is improved on IV medication. No chest pain or shortness of breath or abdominal pain. Had MRI yesterday evening.     Objective   Objective   Vital Signs  Temp:  [97.7 °F (36.5 °C)-98.1 °F (36.7 °C)] 98.1 °F (36.7 °C)  Heart Rate:  [] 70  Resp:  [16-18] 16  BP: (121-158)/(74-91) 121/74  SpO2:  [96 %-100 %] 100 %  on   ;   Device (Oxygen Therapy): room air  Body mass index is 28.67 kg/m².    Physical Exam  Constitutional:       General: She is not in acute distress.     Appearance: Normal appearance. She is not toxic-appearing.   HENT:      Head: Normocephalic and atraumatic.   Cardiovascular:      Rate and Rhythm: Normal rate and regular rhythm.   Pulmonary:      Effort: Pulmonary effort is normal. No respiratory distress.      Breath sounds: Normal breath sounds.   Abdominal:      General: Bowel sounds are normal.      Palpations: Abdomen is soft.      Tenderness: There is no abdominal tenderness.   Musculoskeletal:         General: No swelling.      Cervical back: Normal range of motion.      Right lower leg: No edema.      Left lower leg: No edema.   Skin:     General: Skin is warm and dry.   Neurological:      General: No focal deficit present.      Mental Status: She is alert and oriented to person, place, and time.   Psychiatric:         Mood and Affect: Mood normal.         Behavior: Behavior normal.         Thought Content: Thought content normal.     Results Review  I reviewed the patient's new clinical results.  Results from last 7 days   Lab Units 23  1023 23  1654   WBC 10*3/mm3 5.39 8.08   HEMOGLOBIN g/dL 13.3 14.1   PLATELETS 10*3/mm3 189 222     Results from last 7 days   Lab Units 23  1023 23  1654   SODIUM mmol/L 135* 136   POTASSIUM mmol/L 3.8 3.8   CHLORIDE mmol/L 101 99   CO2 mmol/L  20.4* 25.0   BUN mg/dL 22 18   CREATININE mg/dL 0.92 0.84   GLUCOSE mg/dL 165* 155*     Lab Results   Component Value Date    ANIONGAP 13.6 01/31/2023     Estimated Creatinine Clearance: 52.5 mL/min (by C-G formula based on SCr of 0.92 mg/dL).        Results from last 7 days   Lab Units 01/31/23  1023 01/30/23  1654   CALCIUM mg/dL 9.4 9.7       Glucose   Date/Time Value Ref Range Status   02/01/2023 0547 169 (H) 70 - 130 mg/dL Final     Comment:     Meter: [id number] : [id number] Peter Lino NA   01/31/2023 1700 124 70 - 130 mg/dL Final     Comment:     Meter: [id number] : [id number] Babak Kothari NA   01/31/2023 1115 160 (H) 70 - 130 mg/dL Final     Comment:     Meter: [id number] : [id number] Babak Kothari NA   01/31/2023 0636 125 70 - 130 mg/dL Final     Comment:     Meter: [id number] : [id number] Kpmignon Avila NA       MRI Lumbar Spine Without Contrast    Result Date: 2/1/2023  Electronically signed by Ian Colon MD on 02-01-23 at 0252      Scheduled Meds  gabapentin, 300 mg, Oral, TID  insulin lispro, 0-9 Units, Subcutaneous, TID AC  lisinopril, 5 mg, Oral, QAM  metoprolol succinate XL, 75 mg, Oral, Nightly  pantoprazole, 40 mg, Oral, Q AM  pravastatin, 10 mg, Oral, Nightly  rOPINIRole, 4 mg, Oral, Nightly  sodium chloride, 10 mL, Intravenous, Q12H    Continuous Infusions   PRN Meds  •  acetaminophen  •  cyclobenzaprine  •  dextrose  •  dextrose  •  glucagon (human recombinant)  •  HYDROmorphone  •  melatonin  •  ondansetron  •  oxyCODONE-acetaminophen  •  oxyCODONE-acetaminophen  •  sodium chloride  •  sodium chloride     Diet  NPO Diet NPO Type: Strict NPO    I have personally reviewed:  [x]  Medications  [x]  Laboratory   []  Microbiology   [x]  Radiology severe disc disease with severe canal stenosis L4-5 and severe right foraminal stenosis L1-2  []  EKG/Telemetry   []  Cardiology/Vascular   []  Pathology   []  Records     Assessment/Plan     Active Hospital Problems     Diagnosis  POA   • **Back pain [M54.9]  Unknown   • Low back pain [M54.50]  Yes   • Diabetes mellitus (HCC) [E11.9]  Yes   • Hypertension [I10]  Yes   • Rheumatoid arthritis (HCC) [M06.9]  Yes   • Lumbar canal stenosis [M48.061]  Yes      Resolved Hospital Problems   No resolved problems to display.     68 y.o. female with worsening chronic low back pain.      Low back pain, history of spinal stenosis of lumbar region with neurogenic claudication  -Surgery was planned July 2022 however she suffered a periprosthetic femur fracture shortly afterwards  -Analgesia  -MRI noted. Await further neurosurgical recommendations     Hypertension  -control acceptable continue home medications     DM2  -Acceptable control on correctional insulin  -a1c 7.70% 4 years ago, recheck     LAST hu at home    SCDs for DVT prophylaxis    Discussed with patient, family and nursing staff    Discharge: FELICIANO Roa MD  [facility]  02/01/23

## 2023-02-01 NOTE — PLAN OF CARE
Problem: Adult Inpatient Plan of Care  Goal: Plan of Care Review  Outcome: Ongoing, Progressing  Flowsheets (Taken 2/1/2023 0357)  Progress: no change  Plan of Care Reviewed With: patient  Goal: Patient-Specific Goal (Individualized)  Outcome: Ongoing, Progressing  Goal: Absence of Hospital-Acquired Illness or Injury  Outcome: Ongoing, Progressing  Intervention: Identify and Manage Fall Risk  Recent Flowsheet Documentation  Taken 2/1/2023 0356 by Thelma Francois RN  Safety Promotion/Fall Prevention:   safety round/check completed   room organization consistent  Taken 2/1/2023 0200 by Thelma Francois RN  Safety Promotion/Fall Prevention:   room organization consistent   safety round/check completed  Taken 2/1/2023 0011 by Thelma Francois RN  Safety Promotion/Fall Prevention:   room organization consistent   safety round/check completed   nonskid shoes/slippers when out of bed  Taken 1/31/2023 2212 by Thelma Francois RN  Safety Promotion/Fall Prevention:   safety round/check completed   room organization consistent   nonskid shoes/slippers when out of bed  Taken 1/31/2023 2027 by Thelma Francois RN  Safety Promotion/Fall Prevention:   safety round/check completed   room organization consistent   nonskid shoes/slippers when out of bed  Taken 1/31/2023 1948 by Thelma Francois RN  Safety Promotion/Fall Prevention:   safety round/check completed   room organization consistent   nonskid shoes/slippers when out of bed  Intervention: Prevent Skin Injury  Recent Flowsheet Documentation  Taken 2/1/2023 0356 by Thelma Francois RN  Body Position:   position changed independently   supine  Taken 2/1/2023 0200 by Thelma Francois RN  Body Position: position changed independently  Taken 2/1/2023 0011 by Thelma Francois RN  Body Position:   position changed independently   supine  Taken 1/31/2023 2212 by Thelma Francois RN  Body Position:   position changed independently    supine  Taken 1/31/2023 2027 by Thelma Francois RN  Body Position:   position changed independently   supine  Taken 1/31/2023 1948 by Thelma Francois RN  Body Position:   position changed independently   supine  Intervention: Prevent and Manage VTE (Venous Thromboembolism) Risk  Recent Flowsheet Documentation  Taken 1/31/2023 2027 by Thelma Francois RN  VTE Prevention/Management:   bilateral   sequential compression devices on  Taken 1/31/2023 1948 by Thelma Francois RN  Activity Management: activity adjusted per tolerance  Intervention: Prevent Infection  Recent Flowsheet Documentation  Taken 1/31/2023 2027 by Thelma Fracnois RN  Infection Prevention: rest/sleep promoted  Taken 1/31/2023 1948 by Thelma Francois RN  Infection Prevention: rest/sleep promoted  Goal: Optimal Comfort and Wellbeing  Outcome: Ongoing, Progressing  Intervention: Provide Person-Centered Care  Recent Flowsheet Documentation  Taken 1/31/2023 2027 by Thelma Francois RN  Trust Relationship/Rapport: care explained  Goal: Readiness for Transition of Care  Outcome: Ongoing, Progressing   Goal Outcome Evaluation:  Plan of Care Reviewed With: patient        Progress: no change

## 2023-02-01 NOTE — SIGNIFICANT NOTE
02/01/23 0819   OTHER   Discipline physical therapist   Rehab Time/Intention   Session Not Performed other (see comments)  (Per ARABELLA Art hold PT until post surgery. MRI reveals cauda equina. Will follow up tomorrow 2/2/23 to check if pt has underwent surgery.)   Recommendation   PT - Next Appointment 02/02/23

## 2023-02-01 NOTE — PROGRESS NOTES
NEUROSURGERY PROGRESS NOTE      LOS: 1 day   Patient Care Team:  Lucy Turcios APRN as PCP - General (Nurse Practitioner)  Russel Spear MD as Consulting Physician (Cardiology)  Robbie Clark MD as Consulting Physician (Rheumatology)  Bee Nick MD as Consulting Physician (Endocrinology)    Chief Complaint:  Low back pain; L>R leg pain.     Subjective       Interval History: Denies any bowel or bladder incontinence. MRI lumbar spine has been completed. States pain is most prominent in left anterior thigh and in low back. Has undergone two previous surgeries in the last year for L femur fracture with [facility] orthopedics, Dr. Yarbrough. R leg pain still there but not like the left leg.      History taken from: patient chart    Objective        Vital Signs  Temp:  [97.3 °F (36.3 °C)-98.6 °F (37 °C)] 97.3 °F (36.3 °C)  Heart Rate:  [] 83  Resp:  [16-18] 16  BP: (121-158)/(74-91) 127/85    Physical Exam:     AA&O x 3. Non-toxic appearing.   Able to slightly raise legs off of bed no motor deficits. Sensitivity to light touch in the left anterior thigh.        Results Review:     I reviewed the patient's new clinical results.  I reviewed the patient's new imaging results and agree with the interpretation.  Reviewed MRI lumbar spine results with Dr. Rahman over the phone.  Results were also discussed with the patient and her  at bedside.    MRI LUMBAR SPINE WO CONTRAST 1/31/23    Severe degenerative disc disease of the lumbar spine with right paracentral disc protrusion at L1 to with effacement of the right lateral recess and severe right greater than left foraminal stenosis.  Mild/moderate canal stenosis L2-3.  Left paracentral 1.2 cm disc extrusion with some impingement of the left L3 nerve root causing severe foraminal stenosis on the left.  Severe canal stenosis L4-5 with severe right, moderate left foraminal stenosis.      Assessment & Plan       Back pain    Spinal stenosis, lumbar  region, with neurogenic claudication    Lumbar radiculitis    Diabetes mellitus (HCC)    Hypertension    Rheumatoid arthritis (HCC)    Low back pain      Add IV Solu-Cortef for pain control.  Dr. Rahman would like to hold off on surgery if at all possible.  We will try a lumbar epidural steroid injection tomorrow.   PT to evaluate 2/2/23 - no surgery planned right now; would like to see if she is able to mobilize   Further recommendations to follow.    Kasandra Wilson, CECILIA  02/01/23  18:37 EST

## 2023-02-01 NOTE — NURSING NOTE
Per on call MD for pain clinic [phone]) please call [phone] at 7am and they will schedule lumbar FELICIANO with sedation. Office is currently closed.

## 2023-02-01 NOTE — PLAN OF CARE
Goal Outcome Evaluation: Pt resting in bed with no signs of distress noted at this time. Please see previous note for tomorrows procedure. VS stable. Bed in lowest position with siderails up X2. Call light within reach. RN will continue to monitor                 Problem: Fall Injury Risk  Goal: Absence of Fall and Fall-Related Injury  Outcome: Met  Intervention: Identify and Manage Contributors  Recent Flowsheet Documentation  Taken 2/1/2023 1616 by Apple Duggan RN  Medication Review/Management: medications reviewed  Taken 2/1/2023 1403 by Apple Duggan RN  Medication Review/Management: medications reviewed  Taken 2/1/2023 1200 by Apple Duggan RN  Medication Review/Management: medications reviewed  Taken 2/1/2023 1004 by Apple Duggan RN  Medication Review/Management: medications reviewed  Taken 2/1/2023 0905 by Apple Duggan RN  Medication Review/Management: medications reviewed  Intervention: Promote Injury-Free Environment  Recent Flowsheet Documentation  Taken 2/1/2023 1616 by Apple Duggan RN  Safety Promotion/Fall Prevention:   activity supervised   assistive device/personal items within reach   clutter free environment maintained   fall prevention program maintained   nonskid shoes/slippers when out of bed   room organization consistent   safety round/check completed  Taken 2/1/2023 1403 by Apple Duggan RN  Safety Promotion/Fall Prevention:   activity supervised   assistive device/personal items within reach   clutter free environment maintained   fall prevention program maintained   nonskid shoes/slippers when out of bed   room organization consistent   safety round/check completed  Taken 2/1/2023 1200 by Apple Duggan RN  Safety Promotion/Fall Prevention:   activity supervised   assistive device/personal items within reach   clutter free environment maintained   fall prevention program maintained   nonskid shoes/slippers when out of bed    room organization consistent   safety round/check completed  Taken 2/1/2023 1004 by Apple Duggan, RN  Safety Promotion/Fall Prevention:   activity supervised   assistive device/personal items within reach   clutter free environment maintained   fall prevention program maintained   nonskid shoes/slippers when out of bed   room organization consistent   safety round/check completed  Taken 2/1/2023 0905 by Apple Duggan, RN  Safety Promotion/Fall Prevention:   activity supervised   assistive device/personal items within reach   clutter free environment maintained   fall prevention program maintained   nonskid shoes/slippers when out of bed   room organization consistent   safety round/check completed

## 2023-02-01 NOTE — DISCHARGE PLACEMENT REQUEST
"Kathy Ramirez (68 y.o. Female)     Date of Birth   1955    Social Security Number       Address   [address] [place] [address]    Home Phone   [phone]    MRN   [id number]       Yarsanism   None    Marital Status                               Admission Date   1/30/23    Admission Type   Emergency    Admitting Provider   Kem Roa MD    Attending Provider   Kem Roa MD    Department, Room/Bed   [address], 82/[address]       Discharge Date       Discharge Disposition       Discharge Destination                               Attending Provider: Kem Roa MD    Allergies: No Known Allergies    Isolation: None   Infection: None   Code Status: CPR    Ht: 157.5 cm (62\")   Wt: 71.1 kg (156 lb 12 oz)    Admission Cmt: None   Principal Problem: Back pain [M54.9]                 Active Insurance as of 1/30/2023     Primary Coverage     Payor Plan Insurance Group Employer/Plan Group    MEDICARE MEDICARE A & B      Payor Plan Address Payor Plan Phone Number Payor Plan Fax Number Effective Dates    [address] [phone]  1/1/2018 - None Entered    [facility] [address]       Subscriber Name Subscriber Birth Date Member ID       KATHY RAMIREZ 1955 [id number]           Secondary Coverage     Payor Plan Insurance Group Employer/Plan Group    AARP MC SUP AARP HEALTH CARE OPTIONS [id number]     Payor Plan Address Payor Plan Phone Number Payor Plan Fax Number Effective Dates    [facility] [phone]  4/1/2021 - None Entered    [address]       [facility] [address]       Subscriber Name Subscriber Birth Date Member ID       KATHY RAMIREZ 1955 [id number]                 Emergency Contacts      (Rel.) Home Phone Work Phone Mobile Phone    Guilherme Ramirez (Spouse) -- -- [phone]            {Outbreak/Travel/Exposure Documentation......;  Question Available Choices Patient Response   COVID-19 Outbreak Screen:  Do you currently have a new onset " of the following symptoms?        Fever/Chills, Cough, Shortness of air, Loss of taste or smell, No, Unknown  No (01/30/23 1309)   COVID-19 Outbreak Screen: In the last 14 days, have you had contact with anyone who is ill, has show any of the symptoms listed above and/or has been diagnosis with the 2019 Novel Coronavirus? This includes any immediate household members but excludes any patients with whom you have been in contact within your normal work duties wearing proper PPE, if you are a healthcare worker.  Yes, No, Unknown              Unknown (01/30/23 1309)   COVID-19 Outbreak Screen: Who was notified? Free text (not recorded)   Ebola Screening Outbreak Screen: Have you traveled to the [place] or [place] within the past 21 days?  Yes, No, Unknown No (01/30/23 1309)   Ebola Screening Outbreak Screen: Do you have ANY of the following symptoms: Fever/Chills, Vomiting, Diarrhea, Fatigue, Headache, Muscle pain, Unexplained bleeding, Abdominal (stomach) pain, No, Unknown (not recorded)   Ebola Screening Outbreak Screen: Name of Person notified Free text (not recorded)   Travel Screen: Have you traveled in the last month? If so, to what country have you traveled? If [place] what state? Yes, No, Unknown  List of all countries  List of all States No (01/30/23 1309)  (not recorded)  (not recorded)   Infection Risk: Do you currently have the following symptoms?  (If cough is selected, the Tuberculosis Screen is performed.) Cough, Fever, Rash, No No (01/30/23 1309)   Tuberculosis Screen: Do you have any of the following Tuberculosis Risks?  · Have you lived or spent time with anyone who had or may have TB?  · Have you lived in or visited any of the following areas for more than one month: [place], [place], [place], [place] or [place], the [place] or [place]?  · Do you have HIV/AIDS?  · Have you lived in or worked in a nursing home, homeless shelter, correctional facility, or substance abuse  treatment facility?   · No    If Yes do you have any of the following symptoms? Yes responses display to the right    If Yes, symptoms listed are:  Cough greater than or equal to 3 weeks, Loss of appetite, Unexplained weight loss, Night sweats, Bloody sputum or hemoptysis, Hoarseness, Fever, Fatigue, Chest pain, No (not recorded)  (not recorded)   Exposure Screen: Have you been exposed to any of these contagious diseases in the last month? Measles, Chickenpox, Meningitis, Pertussis, Whooping Cough, No No (01/30/23 9012)

## 2023-02-02 ENCOUNTER — APPOINTMENT (OUTPATIENT)
Dept: GENERAL RADIOLOGY | Facility: HOSPITAL | Age: 68
DRG: 552 | End: 2023-02-02
Payer: MEDICARE

## 2023-02-02 ENCOUNTER — ANESTHESIA EVENT (OUTPATIENT)
Dept: PAIN MEDICINE | Facility: HOSPITAL | Age: 68
DRG: 552 | End: 2023-02-02
Payer: MEDICARE

## 2023-02-02 ENCOUNTER — APPOINTMENT (OUTPATIENT)
Dept: PAIN MEDICINE | Facility: HOSPITAL | Age: 68
DRG: 552 | End: 2023-02-02
Payer: MEDICARE

## 2023-02-02 ENCOUNTER — ANESTHESIA (OUTPATIENT)
Dept: PAIN MEDICINE | Facility: HOSPITAL | Age: 68
DRG: 552 | End: 2023-02-02
Payer: MEDICARE

## 2023-02-02 LAB
ANION GAP SERPL CALCULATED.3IONS-SCNC: 12.8 MMOL/L (ref 5–15)
BUN SERPL-MCNC: 17 MG/DL (ref 8–23)
BUN/CREAT SERPL: 17.7 (ref 7–25)
CALCIUM SPEC-SCNC: 8.9 MG/DL (ref 8.6–10.5)
CHLORIDE SERPL-SCNC: 100 MMOL/L (ref 98–107)
CO2 SERPL-SCNC: 24.2 MMOL/L (ref 22–29)
CREAT SERPL-MCNC: 0.96 MG/DL (ref 0.57–1)
DEPRECATED RDW RBC AUTO: 44 FL (ref 37–54)
EGFRCR SERPLBLD CKD-EPI 2021: 63.8 ML/MIN/1.73
ERYTHROCYTE [DISTWIDTH] IN BLOOD BY AUTOMATED COUNT: 13.9 % (ref 12.3–15.4)
GLUCOSE BLDC GLUCOMTR-MCNC: 226 MG/DL (ref 70–130)
GLUCOSE BLDC GLUCOMTR-MCNC: 252 MG/DL (ref 70–130)
GLUCOSE BLDC GLUCOMTR-MCNC: 284 MG/DL (ref 70–130)
GLUCOSE BLDC GLUCOMTR-MCNC: 296 MG/DL (ref 70–130)
GLUCOSE BLDC GLUCOMTR-MCNC: 306 MG/DL (ref 70–130)
GLUCOSE SERPL-MCNC: 315 MG/DL (ref 65–99)
HBA1C MFR BLD: 7.4 % (ref 4.8–5.6)
HCT VFR BLD AUTO: 37.2 % (ref 34–46.6)
HGB BLD-MCNC: 11.7 G/DL (ref 12–15.9)
MCH RBC QN AUTO: 27.2 PG (ref 26.6–33)
MCHC RBC AUTO-ENTMCNC: 31.5 G/DL (ref 31.5–35.7)
MCV RBC AUTO: 86.5 FL (ref 79–97)
PLATELET # BLD AUTO: 217 10*3/MM3 (ref 140–450)
PMV BLD AUTO: 11.2 FL (ref 6–12)
POTASSIUM SERPL-SCNC: 4.2 MMOL/L (ref 3.5–5.2)
RBC # BLD AUTO: 4.3 10*6/MM3 (ref 3.77–5.28)
SODIUM SERPL-SCNC: 137 MMOL/L (ref 136–145)
WBC NRBC COR # BLD: 6.17 10*3/MM3 (ref 3.4–10.8)

## 2023-02-02 PROCEDURE — 77003 FLUOROGUIDE FOR SPINE INJECT: CPT

## 2023-02-02 PROCEDURE — 25010000002 METHYLPREDNISOLONE PER 80 MG: Performed by: ANESTHESIOLOGY

## 2023-02-02 PROCEDURE — 0 IOPAMIDOL 41 % SOLUTION: Performed by: ANESTHESIOLOGY

## 2023-02-02 PROCEDURE — 99232 SBSQ HOSP IP/OBS MODERATE 35: CPT | Performed by: NURSE PRACTITIONER

## 2023-02-02 PROCEDURE — 63710000001 INSULIN LISPRO (HUMAN) PER 5 UNITS: Performed by: HOSPITALIST

## 2023-02-02 PROCEDURE — 97530 THERAPEUTIC ACTIVITIES: CPT

## 2023-02-02 PROCEDURE — 3E0R3BZ INTRODUCTION OF ANESTHETIC AGENT INTO SPINAL CANAL, PERCUTANEOUS APPROACH: ICD-10-PCS | Performed by: ANESTHESIOLOGY

## 2023-02-02 PROCEDURE — 0 HYDROCORTISONE SOD SUC (PF) 250 MG RECONSTITUTED SOLUTION: Performed by: NURSE PRACTITIONER

## 2023-02-02 PROCEDURE — 25010000002 HYDROMORPHONE PER 4 MG: Performed by: HOSPITALIST

## 2023-02-02 PROCEDURE — 97162 PT EVAL MOD COMPLEX 30 MIN: CPT

## 2023-02-02 PROCEDURE — 82962 GLUCOSE BLOOD TEST: CPT

## 2023-02-02 PROCEDURE — 25010000002 MIDAZOLAM PER 1 MG: Performed by: ANESTHESIOLOGY

## 2023-02-02 PROCEDURE — 85027 COMPLETE CBC AUTOMATED: CPT | Performed by: HOSPITALIST

## 2023-02-02 PROCEDURE — 25010000002 ONDANSETRON PER 1 MG: Performed by: HOSPITALIST

## 2023-02-02 PROCEDURE — 83036 HEMOGLOBIN GLYCOSYLATED A1C: CPT | Performed by: HOSPITALIST

## 2023-02-02 PROCEDURE — 80048 BASIC METABOLIC PNL TOTAL CA: CPT | Performed by: HOSPITALIST

## 2023-02-02 RX ORDER — SODIUM CHLORIDE 0.9 % (FLUSH) 0.9 %
10 SYRINGE (ML) INJECTION EVERY 12 HOURS SCHEDULED
Status: DISCONTINUED | OUTPATIENT
Start: 2023-02-02 | End: 2023-02-07 | Stop reason: HOSPADM

## 2023-02-02 RX ORDER — MIDAZOLAM HYDROCHLORIDE 1 MG/ML
1 INJECTION INTRAMUSCULAR; INTRAVENOUS AS NEEDED
Status: DISCONTINUED | OUTPATIENT
Start: 2023-02-02 | End: 2023-02-04

## 2023-02-02 RX ORDER — SODIUM CHLORIDE 0.9 % (FLUSH) 0.9 %
1-10 SYRINGE (ML) INJECTION AS NEEDED
Status: DISCONTINUED | OUTPATIENT
Start: 2023-02-02 | End: 2023-02-07 | Stop reason: HOSPADM

## 2023-02-02 RX ORDER — FENTANYL CITRATE 50 UG/ML
50 INJECTION, SOLUTION INTRAMUSCULAR; INTRAVENOUS AS NEEDED
Status: DISCONTINUED | OUTPATIENT
Start: 2023-02-02 | End: 2023-02-04

## 2023-02-02 RX ORDER — METHYLPREDNISOLONE ACETATE 80 MG/ML
80 INJECTION, SUSPENSION INTRA-ARTICULAR; INTRALESIONAL; INTRAMUSCULAR; SOFT TISSUE ONCE
Status: COMPLETED | OUTPATIENT
Start: 2023-02-02 | End: 2023-02-02

## 2023-02-02 RX ORDER — LIDOCAINE HYDROCHLORIDE 10 MG/ML
1 INJECTION, SOLUTION INFILTRATION; PERINEURAL ONCE AS NEEDED
Status: DISCONTINUED | OUTPATIENT
Start: 2023-02-02 | End: 2023-02-04

## 2023-02-02 RX ORDER — SODIUM CHLORIDE 9 MG/ML
40 INJECTION, SOLUTION INTRAVENOUS AS NEEDED
Status: DISCONTINUED | OUTPATIENT
Start: 2023-02-02 | End: 2023-02-07 | Stop reason: HOSPADM

## 2023-02-02 RX ORDER — SODIUM CHLORIDE 0.9 % (FLUSH) 0.9 %
10 SYRINGE (ML) INJECTION AS NEEDED
Status: DISCONTINUED | OUTPATIENT
Start: 2023-02-02 | End: 2023-02-07 | Stop reason: HOSPADM

## 2023-02-02 RX ADMIN — OXYCODONE HYDROCHLORIDE AND ACETAMINOPHEN 1 TABLET: 7.5; 325 TABLET ORAL at 12:50

## 2023-02-02 RX ADMIN — HYDROMORPHONE HYDROCHLORIDE 0.5 MG: 1 INJECTION, SOLUTION INTRAMUSCULAR; INTRAVENOUS; SUBCUTANEOUS at 20:54

## 2023-02-02 RX ADMIN — Medication 10 ML: at 20:54

## 2023-02-02 RX ADMIN — IOPAMIDOL 10 ML: 408 INJECTION, SOLUTION INTRATHECAL at 09:18

## 2023-02-02 RX ADMIN — MIDAZOLAM 1 MG: 1 INJECTION INTRAMUSCULAR; INTRAVENOUS at 09:19

## 2023-02-02 RX ADMIN — PRAVASTATIN SODIUM 10 MG: 10 TABLET ORAL at 20:54

## 2023-02-02 RX ADMIN — METOPROLOL SUCCINATE 75 MG: 25 TABLET, EXTENDED RELEASE ORAL at 20:53

## 2023-02-02 RX ADMIN — GABAPENTIN 300 MG: 300 CAPSULE ORAL at 08:37

## 2023-02-02 RX ADMIN — HYDROCORTISONE SODIUM SUCCINATE 250 MG: 250 INJECTION, POWDER, FOR SOLUTION INTRAMUSCULAR; INTRAVENOUS at 18:37

## 2023-02-02 RX ADMIN — HYDROCORTISONE SODIUM SUCCINATE 250 MG: 250 INJECTION, POWDER, FOR SOLUTION INTRAMUSCULAR; INTRAVENOUS at 01:03

## 2023-02-02 RX ADMIN — Medication 10 ML: at 23:45

## 2023-02-02 RX ADMIN — GABAPENTIN 300 MG: 300 CAPSULE ORAL at 20:53

## 2023-02-02 RX ADMIN — HYDROMORPHONE HYDROCHLORIDE 0.5 MG: 1 INJECTION, SOLUTION INTRAMUSCULAR; INTRAVENOUS; SUBCUTANEOUS at 15:53

## 2023-02-02 RX ADMIN — METHYLPREDNISOLONE ACETATE 80 MG: 80 INJECTION, SUSPENSION INTRA-ARTICULAR; INTRALESIONAL; INTRAMUSCULAR; SOFT TISSUE at 09:19

## 2023-02-02 RX ADMIN — HYDROCORTISONE SODIUM SUCCINATE 250 MG: 250 INJECTION, POWDER, FOR SOLUTION INTRAMUSCULAR; INTRAVENOUS at 23:45

## 2023-02-02 RX ADMIN — ONDANSETRON 4 MG: 2 INJECTION INTRAMUSCULAR; INTRAVENOUS at 08:41

## 2023-02-02 RX ADMIN — OXYCODONE HYDROCHLORIDE AND ACETAMINOPHEN 1 TABLET: 7.5; 325 TABLET ORAL at 08:41

## 2023-02-02 RX ADMIN — ROPINIROLE 4 MG: 2 TABLET, FILM COATED ORAL at 20:54

## 2023-02-02 RX ADMIN — HYDROCORTISONE SODIUM SUCCINATE 250 MG: 250 INJECTION, POWDER, FOR SOLUTION INTRAMUSCULAR; INTRAVENOUS at 12:50

## 2023-02-02 RX ADMIN — HYDROCORTISONE SODIUM SUCCINATE 250 MG: 250 INJECTION, POWDER, FOR SOLUTION INTRAMUSCULAR; INTRAVENOUS at 06:23

## 2023-02-02 RX ADMIN — GABAPENTIN 300 MG: 300 CAPSULE ORAL at 16:04

## 2023-02-02 RX ADMIN — OXYCODONE HYDROCHLORIDE AND ACETAMINOPHEN 1 TABLET: 7.5; 325 TABLET ORAL at 02:14

## 2023-02-02 RX ADMIN — INSULIN LISPRO 6 UNITS: 100 INJECTION, SOLUTION INTRAVENOUS; SUBCUTANEOUS at 08:37

## 2023-02-02 RX ADMIN — INSULIN LISPRO 7 UNITS: 100 INJECTION, SOLUTION INTRAVENOUS; SUBCUTANEOUS at 18:37

## 2023-02-02 RX ADMIN — OXYCODONE HYDROCHLORIDE AND ACETAMINOPHEN 1 TABLET: 7.5; 325 TABLET ORAL at 23:45

## 2023-02-02 RX ADMIN — Medication 10 ML: at 08:41

## 2023-02-02 RX ADMIN — OXYCODONE HYDROCHLORIDE AND ACETAMINOPHEN 1 TABLET: 7.5; 325 TABLET ORAL at 18:54

## 2023-02-02 RX ADMIN — LISINOPRIL 5 MG: 5 TABLET ORAL at 08:37

## 2023-02-02 RX ADMIN — HYDROMORPHONE HYDROCHLORIDE 0.5 MG: 1 INJECTION, SOLUTION INTRAMUSCULAR; INTRAVENOUS; SUBCUTANEOUS at 06:40

## 2023-02-02 RX ADMIN — INSULIN LISPRO 4 UNITS: 100 INJECTION, SOLUTION INTRAVENOUS; SUBCUTANEOUS at 12:45

## 2023-02-02 NOTE — DISCHARGE INSTRUCTIONS

## 2023-02-02 NOTE — PROGRESS NOTES
NEUROSURGERY PROGRESS NOTE     LOS: 2 days   Patient Care Team:  Lucy Turcios APRN as PCP - General (Nurse Practitioner)  Russel Spear MD as Consulting Physician (Cardiology)  Robbie Clark MD as Consulting Physician (Rheumatology)  Bee Nick MD as Consulting Physician (Endocrinology)    Chief Complaint:  Low back and L>R leg pain    Subjective       Interval History: Got lumbar FELICIANO this morning.  Had had some relief but is now feeling the pain come back again.  Also mentioned intermittent history of bladder/bowel incontinence and associated diarrhea over the last few years. Denied perineal or perirectal numbness/tingling. Most bothersome symptom is the left anterior thigh pain. Also reports intermittent episodes severe tingling, shooting pain down the right lower leg down to the right foot.     History taken from: patient chart    Objective        Vital Signs  Temp:  [97 °F (36.1 °C)-98.6 °F (37 °C)] 97.5 °F (36.4 °C)  Heart Rate:  [63-85] 63  Resp:  [16-18] 16  BP: (126-165)/(68-87) 129/79    Physical Exam:     AA&O x 3.   No motor or sensory deficits in legs.   + sensation in perineal, perirectal area. Rectal tone score was 3 (normal). No concern for cauda equina syndrome.   Above exam was performed with patient's nurse, Venus in the room.       Assessment & Plan       Back pain    Spinal stenosis, lumbar region, with neurogenic claudication    Lumbar radiculitis    Diabetes mellitus (HCC)    Hypertension    Rheumatoid arthritis (HCC)    Low back pain    Above exam findings and patient's response to lumbar FELICIANO reported to Dr. Rahman. He will see the patient later today.     This part is from Dr. Rahman.  This patient complains of pain in her left anterior thigh as well as pain in her right foot.  I again reviewed her MRI.  This does show a herniated disc on the right side at L1 to with bilateral central stenosis at L2-3 and fairly severe lateral recess stenosis on the left at L3-4.  She  has severe central stenosis at L4-5 and some mild foraminal stenosis at L5-S1.  She has no symptoms of cauda equina syndrome.  I did very long discussion with the patient and her  about the situation.  I told her that if we did surgery on her she would likely have to be decompressed from at least L1-L5 if not S1.  That is a really big surgery to be doing on someone at any point.  She did get some relief from the epidural block this morning and so I suggested that we will reassess her tomorrow.  If the pain does not come back full-blown then we will let her go home and schedule her for another block as an outpatient.  She is in agreement with that plan.      Kasandra Wilson, CECILIA  02/02/23  13:17 EST

## 2023-02-02 NOTE — THERAPY EVALUATION
Patient Name: Kathy Ramirez  : 1955    MRN: [id number]                              Today's Date: 2023       Admit Date: 2023    Visit Dx:     ICD-10-CM ICD-9-CM   1. Uncontrolled pain  R52 780.96   2. Chronic midline low back pain with left-sided sciatica  M54.42 724.2    G89.29 724.3     338.29   3. 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   • Chronic pain disorder   • Encounter for long-term (current) use of insulin ([facility])   • Lumbar trigger point syndrome   • Sciatica   • Osteoarthritis   • Degeneration of lumbar intervertebral disc   • Uncontrolled pain   • Back pain   • Low back pain     Past Medical History:   Diagnosis Date   • Arm pain     RIGHT   • Chronic low back pain    • DDD (degenerative disc disease), lumbar    • Diabetes mellitus (HCC)     TYPE 2   • Dysrhythmia    • GERD (gastroesophageal reflux disease)    • Herpes     GETS FEVER BLISTERS AT TIMES   • History of echocardiogram 2019    EF 68% Grade 1 diastolic dysfucntion. Mild AR. Trace TR.    • History of hepatitis A     1970   • History of transfusion    • Humerus shaft fracture     RIGHT   • Hyperlipidemia    • Hypertension    • IBS (irritable bowel syndrome)    • Lumbar canal stenosis    • Lumbar radiculopathy    • PONV (postoperative nausea and vomiting)    • Psoriasis    • Psoriatic arthritis (HCC)    • Rheumatoid arthritis (HCC)    • RLS (restless legs syndrome)    • Sinus tachycardia      Past Surgical  History:   Procedure Laterality Date   • ANTERIOR CERVICAL DISCECTOMY W/ FUSION N/A 2018    Procedure: C5 6 anterior cervical discectomy, fusion and instrumentation;  Surgeon: Robbie Rahman MD;  Location: [facility];  Service: Neurosurgery   • APPENDECTOMY  1972   •  SECTION      5 csection   • CHOLECYSTECTOMY     • COLONOSCOPY     • HYSTERECTOMY     • KNEE ARTHROPLASTY Left    • KNEE ARTHROPLASTY Right    • ORIF HUMERUS FRACTURE Right 2020    Procedure: REPAIR NONUNION RIGHT HUMERUS SHAFT FRACTURE, EXPLORATION RADIAL NERVE;  Surgeon: Betito Delaney MD;  Location: [facility];  Service: Orthopedics;  Laterality: Right;   • TOTAL SHOULDER ARTHROPLASTY W/ DISTAL CLAVICLE EXCISION Right 3/10/2020    Procedure: RIGHT TOTAL SHOULDER REVERSE ARTHROPLASTY, ORIF OF PERIPROSTHETIC FRACTURE;  Surgeon: Festus Siu MD;  Location: [facility];  Service: Orthopedics;  Laterality: Right;   • WISDOM TOOTH EXTRACTION        General Information     Row Name 23 1526          Physical Therapy Time and Intention    Document Type evaluation (P)   -MB     Mode of Treatment physical therapy (P)   -MB     Row Name 23 1526          General Information    Patient Profile Reviewed yes (P)   -MB     Prior Level of Function independent: (P)   -MB     Existing Precautions/Restrictions spinal (P)   -MB     Barriers to Rehab none identified (P)   -MB     Row Name 23 1526          Living Environment    People in Home spouse (P)   -MB     Row Name 23 1526          Home Main Entrance    Number of Stairs, Main Entrance one (P)   -MB     Row Name 23 1526          Cognition    Orientation Status (Cognition) oriented x 4 (P)   -MB     Row Name 23 1544 23 1526       Safety Issues, Functional Mobility    Safety Issues Affecting Function (Mobility) -- sequencing abilities (P)   -MB    Impairments Affecting Function (Mobility) pain (P)   -MB --  (P)   -MB    Comment, Safety Issues/Impairments (Mobility) -- gait belt non skid socks donned (P)   -MB          User Key  (r) = Recorded By, (t) = Taken By, (c) = Cosigned By    Initials Name Provider Type    Aram Farrell, PT Student PT Student               Mobility     Row Name 02/02/23 1527          Bed Mobility    Bed Mobility supine-sit;sit-supine (P)   -MB     Supine-Sit [place] (Bed Mobility) standby assist (P)   -MB     Sit-Supine [place] (Bed Mobility) standby assist (P)   -MB     Assistive Device (Bed Mobility) bed rails (P)   -MB     Row Name 02/02/23 1527          Bed-Chair Transfer    Bed-Chair [place] (Transfers) standby assist (P)   -MB     Assistive Device (Bed-Chair Transfers) walker, front-wheeled (P)   -MB     Row Name 02/02/23 1527          Sit-Stand Transfer    Sit-Stand [place] (Transfers) standby assist (P)   -MB     Assistive Device (Sit-Stand Transfers) walker, front-wheeled (P)   -MB     Row Name 02/02/23 1527          Gait/Stairs (Locomotion)    Florence Level (Gait) standby assist (P)   -MB     Assistive Device (Gait) walker, front-wheeled (P)   -MB     Distance in Feet (Gait) 80' (P)   -MB     Deviations/Abnormal Patterns (Gait) gait speed decreased;edil decreased (P)   -MB     Bilateral Gait Deviations forward flexed posture (P)   -MB     Florence Level (Stairs) not tested (P)   -MB     Comment, (Gait/Stairs) no unsteadiness or LOB (P)   -MB           User Key  (r) = Recorded By, (t) = Taken By, (c) = Cosigned By    Initials Name Provider Type    Aram Farrell, PT Student PT Student               Obj/Interventions     Row Name 02/02/23 1528          Range of Motion Comprehensive    General Range of Motion no range of motion deficits identified (P)   -MB     Row Name 02/02/23 1528          Strength Comprehensive (MMT)    General Manual Muscle Testing (MMT) Assessment no strength deficits identified (P)   -MB     Row Name 02/02/23 1528           Balance    Balance Assessment sitting static balance;sit to stand dynamic balance;standing static balance (P)   -MB     Static Sitting Balance standby assist (P)   -MB     Position, Sitting Balance sitting edge of bed;unsupported (P)   -MB     Sit to Stand Dynamic Balance standby assist (P)   -MB     Static Standing Balance standby assist (P)   -MB     Position/Device Used, Standing Balance walker, front-wheeled (P)   -MB           User Key  (r) = Recorded By, (t) = Taken By, (c) = Cosigned By    Initials Name Provider Type    Aram Farrell, PT Student PT Student               Goals/Plan    No documentation.                Clinical Impression     Row Name 02/02/23 1529          Pain    Pre/Posttreatment Pain Comment does not report pain with mobility tasks today (P)   -MB     Row Name 02/02/23 1529          Plan of Care Review    Plan of Care Reviewed With patient (P)   -MB     Progress improving (P)   -MB     Outcome Evaluation Pt admitted for low back pain s/p lumbar FELICIANO procedure 2/2, hx spinal stenosis . Pt seen for PT eval today. Spouse present upon arrival and for entirety of therapy session. PLOF IND home with spouse. Ambulates w RW with forearm plateform at baseline. Pt states she previously received HH PT in late 2022 for femur fx and has been to [facility] for back in the past. SBAx1 for all mobility tasks today. Amb 80' w RW, no reports of dizziness or pain while walking. No observed unsteadiness or LOB. Pt states she feels close to [place] with her mobility. PT discussed d/c recommendations home with HH or outpatient PT. Need for skilled PT intervention not appropriate at this time. Will sign off unless further needed. (P)   -MB     Row Name 02/02/23 1529          Therapy Assessment/Plan (PT)    Criteria for Skilled Interventions Met (PT) no (P)   -MB     Therapy Frequency (PT) evaluation only (P)   -MB     Row Name 02/02/23 1529          Positioning and Restraints    Pre-Treatment Position in bed  (P)   -MB     Post Treatment Position bed (P)   -MB     In Bed supine;call light within reach;encouraged to call for assist;with family/caregiver;side rails up x3 (P)   -MB           User Key  (r) = Recorded By, (t) = Taken By, (c) = Cosigned By    Initials Name Provider Type    Aram Farrell, PT Student PT Student               Outcome Measures     Row Name 02/02/23 1542          How much help from another person do you currently need...    Turning from your back to your side while in flat bed without using bedrails? 4 (P)   -MB     Moving from lying on back to sitting on the side of a flat bed without bedrails? 4 (P)   -MB     Moving to and from a bed to a chair (including a wheelchair)? 4 (P)   -MB     Standing up from a chair using your arms (e.g., wheelchair, bedside chair)? 4 (P)   -MB     Climbing 3-5 steps with a railing? 3 (P)   -MB     To walk in hospital room? 4 (P)   -MB     AM-PAC 6 Clicks Score (PT) 23 (P)   -MB     Highest level of mobility 7 --> Walked 25 feet or more (P)   -MB     Row Name 02/02/23 1542          Functional Assessment    Outcome Measure Options AM-PAC 6 Clicks Basic Mobility (PT) (P)   -MB           User Key  (r) = Recorded By, (t) = Taken By, (c) = Cosigned By    Initials Name Provider Type    Aram Farrell, PT Student PT Student                             Physical Therapy Education     Title: PT OT SLP Therapies (In Progress)     Topic: Physical Therapy (In Progress)     Point: Mobility training (Done)     Learning Progress Summary           Patient Acceptance, E,TB, VU by MB at 2/2/2023 1543   Significant Other Acceptance, E,TB, VU by MB at 2/2/2023 1543                   Point: Home exercise program (Not Started)     Learner Progress:  Not documented in this visit.          Point: Body mechanics (Not Started)     Learner Progress:  Not documented in this visit.          Point: Precautions (Not Started)     Learner Progress:  Not documented in this visit.                       User Key     Initials Effective Dates Name Provider Type Discipline    MB 12/30/22 -  Aram Wilkins, PT Student PT Student PT              PT Recommendation and Plan     Plan of Care Reviewed With: (P) patient  Progress: (P) improving  Outcome Evaluation: (P) Pt admitted for low back pain s/p lumbar FELICIANO procedure 2/2, hx spinal stenosis . Pt seen for PT eval today. Spouse present upon arrival and for entirety of therapy session. PLOF IND home with spouse. Ambulates w RW with forearm plateform at baseline. Pt states she previously received HH PT in late 2022 for femur fx and has been to [facility] for back in the past. SBAx1 for all mobility tasks today. Amb 80' w RW, no reports of dizziness or pain while walking. No observed unsteadiness or LOB. Pt states she feels close to [facility] with her mobility. PT discussed d/c recommendations home with HH or outpatient PT. Need for skilled PT intervention not appropriate at this time. Will sign off unless further needed.     Time Calculation:    PT Charges     Row Name 02/02/23 1526             Time Calculation    Start Time 1329 (P)   -MB      Stop Time 1346 (P)   -MB      Time Calculation (min) 17 min (P)   -MB      PT Received On 02/02/23 (P)   -MB         Time Calculation- PT    Total Timed Code Minutes- PT 17 minute(s) (P)   -MB            User Key  (r) = Recorded By, (t) = Taken By, (c) = Cosigned By    Initials Name Provider Type    Aram Farrell, PT Student PT Student              Therapy Charges for Today     Code Description Service Date Service Provider Modifiers Qty    [id number]  PT EVAL MOD COMPLEXITY 1 2/2/2023 Aram Wilkins, PT Student GP 1    [id number]  PT THERAPEUTIC ACT EA 15 MIN 2/2/2023 Aram Wilkins, PT Student GP 1          PT G-Codes  Outcome Measure Options: (P) AM-PAC 6 Clicks Basic Mobility (PT)  AM-PAC 6 Clicks Score (PT): (P) 23  PT Discharge Summary  Anticipated Discharge Disposition (PT): (P) home with home health, home with  outpatient therapy services    Aram Wilkins, PT Student  2/2/2023

## 2023-02-02 NOTE — PROGRESS NOTES
Name: Kathy Ramirez ADMIT: 2023   : 1955  PCP: Lucy Turcios APRN    MRN: [id number] LOS: 2 days   AGE/SEX: 68 y.o. female  ROOM: [facility]     Subjective   Subjective    Pain is improved today. Had epidural steroid injection this morning. She states that has worked in the past for a few days and she thinks this will be the same     Objective   Objective   Vital Signs  Temp:  [97 °F (36.1 °C)-98.6 °F (37 °C)] 97.5 °F (36.4 °C)  Heart Rate:  [63-85] 63  Resp:  [16-18] 16  BP: (126-165)/(68-87) 129/79  SpO2:  [95 %-100 %] 96 %  on   ;   Device (Oxygen Therapy): room air  Body mass index is 28.15 kg/m².    Physical Exam  Constitutional:       General: She is not in acute distress.     Appearance: Normal appearance. She is not toxic-appearing.   HENT:      Head: Normocephalic and atraumatic.   Cardiovascular:      Rate and Rhythm: Normal rate and regular rhythm.   Pulmonary:      Effort: Pulmonary effort is normal. No respiratory distress.      Breath sounds: Normal breath sounds.   Abdominal:      General: Bowel sounds are normal.      Palpations: Abdomen is soft.      Tenderness: There is no abdominal tenderness.   Musculoskeletal:         General: No swelling.      Cervical back: Normal range of motion.      Right lower leg: No edema.      Left lower leg: No edema.   Skin:     General: Skin is warm and dry.   Neurological:      General: No focal deficit present.      Mental Status: She is alert and oriented to person, place, and time.   Psychiatric:         Mood and Affect: Mood normal.         Behavior: Behavior normal.         Thought Content: Thought content normal.     Results Review  I reviewed the patient's new clinical results.  Results from last 7 days   Lab Units 23  1023 23  1654   WBC 10*3/mm3 5.39 8.08   HEMOGLOBIN g/dL 13.3 14.1   PLATELETS 10*3/mm3 189 222     Results from last 7 days   Lab Units 23  1023 23  1654   SODIUM mmol/L 135* 136   POTASSIUM mmol/L 3.8 3.8    CHLORIDE mmol/L 101 99   CO2 mmol/L 20.4* 25.0   BUN mg/dL 22 18   CREATININE mg/dL 0.92 0.84   GLUCOSE mg/dL 165* 155*     Lab Results   Component Value Date    ANIONGAP 13.6 01/31/2023     Estimated Creatinine Clearance: 52.1 mL/min (by C-G formula based on SCr of 0.92 mg/dL).        Results from last 7 days   Lab Units 01/31/23  1023 01/30/23  1654   CALCIUM mg/dL 9.4 9.7       Glucose   Date/Time Value Ref Range Status   02/02/2023 1119 226 (H) 70 - 130 mg/dL Final     Comment:     Meter: [id number] : [id number] Quoc Norma NA   02/02/2023 0911 252 (H) 70 - 130 mg/dL Final     Comment:     Meter: [id number] : [id number] Mohsen CHARLES RN   02/02/2023 0600 296 (H) 70 - 130 mg/dL Final     Comment:     Meter: [id number] : [id number] Peter Lino NA   02/01/2023 1602 121 70 - 130 mg/dL Final     Comment:     Meter: [id number] : [id number] Babak Jessicaemilia NA   02/01/2023 1107 163 (H) 70 - 130 mg/dL Final     Comment:     Meter: [id number] : [id number] Babak Kothari NA   02/01/2023 0547 169 (H) 70 - 130 mg/dL Final     Comment:     Meter: [id number] : [id number] Peter Morfinertad NA   01/31/2023 1700 124 70 - 130 mg/dL Final     Comment:     Meter: [id number] : [id number] Babak Chanaranzaemilia TINOCO       MRI Lumbar Spine Without Contrast    Result Date: 2/1/2023  Electronically signed by Ian Colon MD on 02-01-23 at 0252      Scheduled Meds  gabapentin, 300 mg, Oral, TID  hydrocortisone sodium succinate, 250 mg, Intravenous, Q6H  insulin lispro, 0-9 Units, Subcutaneous, TID AC  lisinopril, 5 mg, Oral, QAM  metoprolol succinate XL, 75 mg, Oral, Nightly  pantoprazole, 40 mg, Oral, Q AM  pravastatin, 10 mg, Oral, Nightly  rOPINIRole, 4 mg, Oral, Nightly  sodium chloride, 10 mL, Intravenous, Q12H  sodium chloride, 10 mL, Intravenous, Q12H    Continuous Infusions   PRN Meds  •  acetaminophen  •  cyclobenzaprine  •  dextrose  •  dextrose  •  fentanyl  •  glucagon (human  recombinant)  •  HYDROmorphone  •  lidocaine  •  melatonin  •  midazolam  •  ondansetron  •  oxyCODONE-acetaminophen  •  oxyCODONE-acetaminophen  •  sodium chloride  •  sodium chloride  •  sodium chloride  •  sodium chloride  •  sodium chloride     Diet  Diet: Diabetic Diets; Consistent Carbohydrate; Texture: Regular Texture (IDDSI 7); Fluid Consistency: Thin (IDDSI 0)    I have personally reviewed:  [x]  Medications  [x]  Laboratory   []  Microbiology   []  Radiology  []  EKG/Telemetry   []  Cardiology/Vascular   []  Pathology   []  Records     Assessment/Plan     Active Hospital Problems    Diagnosis  POA   • **Back pain [M54.9]  Yes   • Low back pain [M54.50]  Yes   • Diabetes mellitus (HCC) [E11.9]  Yes   • Hypertension [I10]  Yes   • Rheumatoid arthritis (HCC) [M06.9]  Yes   • Spinal stenosis, lumbar region, with neurogenic claudication [M48.062]  Yes   • Lumbar radiculitis [M54.16]  Unknown      Resolved Hospital Problems   No resolved problems to display.     68 y.o. female with worsening chronic low back pain.      Low back pain, history of spinal stenosis of lumbar region with neurogenic claudication  -Surgery was planned July 2022 however she suffered a periprosthetic femur fracture shortly afterwards  -Analgesia  -Neurosurgery would like to hold off on surgery if possible  -IV steroids, epidural steroid injection today     Hypertension  -control acceptable on home medications     DM2  -Control is worse with IV steroid- only 1 dose for now we will just monitor on correctional insulin  -a1c 7.70% 4 years ago repeat pending     RA  -arava at home    SCDs for DVT prophylaxis    Discussed with patient, family and nursing staff    Discharge: FELICIANO Roa MD  [facility]  02/02/23

## 2023-02-02 NOTE — PLAN OF CARE
Goal Outcome Evaluation:  Plan of Care Reviewed With: (P) patient        Progress: (P) improving  Outcome Evaluation: (P) Pt admitted for low back pain s/p lumbar FELICIANO procedure 2/2, hx spinal stenosis . Pt seen for PT eval today. Spouse present upon arrival and for entirety of therapy session. PLOF IND home with spouse. Ambulates w RW with forearm plateform at baseline. Pt states she previously received HH PT in late 2022 for femur fx and has been to [facility] for back in the past. SBAx1 for all mobility tasks today. Amb 80' w RW, no reports of dizziness or pain while walking. No observed unsteadiness or LOB. Pt states she feels close to [facility] with her mobility. PT discussed d/c recommendations home with HH or outpatient PT. Need for skilled PT intervention not appropriate at this time. Will sign off unless further needed.

## 2023-02-02 NOTE — PLAN OF CARE
Goal Outcome Evaluation:  Plan of Care Reviewed With: patient   Patient is alert and oriented times four. Up with assistance of one. She has been NPO since midnight for procedure today. Complains of back pain. Frequent pain medication given . Will continue to monitor.

## 2023-02-02 NOTE — ANESTHESIA PROCEDURE NOTES
PAIN Epidural block    Pre-sedation assessment completed: 2/2/2023 9:07 AM    Patient reassessed immediately prior to procedure    Patient location during procedure: pain clinic  Start Time: 2/2/2023 9:07 AM  Stop Time: 2/2/2023 9:22 AM  Indication:procedure for pain  Performed By  Anesthesiologist: Selene Knapp MD  Preanesthetic Checklist  Completed: patient identified, IV checked, site marked, risks and benefits discussed, surgical consent, monitors and equipment checked, pre-op evaluation and timeout performed  Additional Notes  Fluoro used.    Dx: lumbar spinal stenosis w/ neurogenic claudication   Prep:  Pt Position:prone  Sterile Tech:cap, gloves, mask and sterile barrier  Prep:chlorhexidine gluconate and isopropyl alcohol  Monitoring:blood pressure monitoring, continuous pulse oximetry and EKG  Procedure:Sedation: no     Approach:midline  Guidance: fluoroscopy  Location:lumbar  Level:L5-S1  Needle Type:Tuohy  Needle Gauge:20  Aspiration:negative  Medications:  Preservative Free Saline:3mL  Isovue:2mL  Comments:B/l spread c/w epidurogramDepomedrol:80  Post Assessment:  Dressing:occlusive dressing applied  Pt Tolerance:patient tolerated the procedure well with no apparent complications  Complications:no

## 2023-02-02 NOTE — NURSING NOTE
Phone report to Jung. Explained pt had depo 80mg in epidural on LESI and versed 1mg sivp for sedation. Ice packs applied to leg for dicomfort. Pain is a 1 on pain scale. Explained for someone  help pat if she gets up due to sedation. Average time for pt to feel some relief is 2 days. Pt verbalized understanding.

## 2023-02-02 NOTE — H&P
[facility]    History and Physical    Patient Name: Kathy Ramirez  :  1955  MRN:  [id number]  Date of Admission: 2023    Subjective     Patient is a 68 y.o. female presents with chief complaint of acute on chronic, severe low back and leg: left pain.  Onset of symptoms was abrupt starting 2 days ago.  Symptoms are associated/aggravated by nothing in particular or activity. Symptoms improve with pain medication and nothing     Ms. Ramirez is well know to Dr. Rahman & in 2022 had a lumbar myelography due to her complaints of left thigh pain.  The study revealed a right-sided L1-2 disc herniation, severe L2-3 stenosis, large left L3-4 disc herniation going down to the L4-5 level.  Dr. Rahman had plan to proceed with decompression procedure from L1 to the sacrum and had made arrangements for the patient to see Dr. Paul for a fusion portion of the surgery.  Unfortunately the patient required reoperation on the femur @  ortho & f/u w/ GLORIA was delayed.  She'd been doing alright until a few days ago when the left thigh pain became excrutiating & she presented to the ED. Dr. Rahman would like to hold off on surgery for now & has asked that we place a LESI which Ms. Ramirez presents for this AM.       The following portions of the patients history were reviewed and updated as appropriate: current medications, allergies, past medical history, past surgical history, past family history, past social history and problem list                Objective     Past Medical History:   Past Medical History:   Diagnosis Date   • Arm pain     RIGHT   • Chronic low back pain    • DDD (degenerative disc disease), lumbar    • Diabetes mellitus (HCC)     TYPE 2   • Dysrhythmia    • GERD (gastroesophageal reflux disease)    • Herpes     GETS FEVER BLISTERS AT TIMES   • History of echocardiogram 2019    EF 68% Grade 1 diastolic dysfucntion. Mild AR. Trace TR.    • History of hepatitis A     1970   • History of transfusion    •  Humerus shaft fracture     RIGHT   • Hyperlipidemia    • Hypertension    • IBS (irritable bowel syndrome)    • Lumbar canal stenosis    • Lumbar radiculopathy    • PONV (postoperative nausea and vomiting)    • Psoriasis    • Psoriatic arthritis (HCC)    • Rheumatoid arthritis (HCC)    • RLS (restless legs syndrome)    • Sinus tachycardia      Past Surgical History:   Past Surgical History:   Procedure Laterality Date   • ANTERIOR CERVICAL DISCECTOMY W/ FUSION N/A 2018    Procedure: C5 6 anterior cervical discectomy, fusion and instrumentation;  Surgeon: Robbie Rahman MD;  Location: [facility] OR;  Service: Neurosurgery   • APPENDECTOMY     •  SECTION      5 csection   • CHOLECYSTECTOMY     • COLONOSCOPY     • HYSTERECTOMY     • KNEE ARTHROPLASTY Left    • KNEE ARTHROPLASTY Right    • ORIF HUMERUS FRACTURE Right 2020    Procedure: REPAIR NONUNION RIGHT HUMERUS SHAFT FRACTURE, EXPLORATION RADIAL NERVE;  Surgeon: Betito Delaney MD;  Location: [facility] OR;  Service: Orthopedics;  Laterality: Right;   • TOTAL SHOULDER ARTHROPLASTY W/ DISTAL CLAVICLE EXCISION Right 3/10/2020    Procedure: RIGHT TOTAL SHOULDER REVERSE ARTHROPLASTY, ORIF OF PERIPROSTHETIC FRACTURE;  Surgeon: Festus Siu MD;  Location: [facility];  Service: Orthopedics;  Laterality: Right;   • WISDOM TOOTH EXTRACTION       Family History:   Family History   Problem Relation Age of Onset   • Cerebral aneurysm Mother    • Leukemia Father         Has pacemaker   • Prostate cancer Father    • Skin cancer Father    • Breast cancer Sister    • Hyperlipidemia Sister    • Hypertension Brother    • Hyperlipidemia Brother    • Irregular heart beat Brother    • Malig Hyperthermia Neg Hx      Social History:   Social History     Socioeconomic History   • Marital status:    • Number of children: 5   • Years of education: MA   Tobacco Use   • Smoking status: Never   • Smokeless tobacco:  "Never   Vaping Use   • Vaping Use: Never used   Substance and Sexual Activity   • Alcohol use: Yes     Comment: rare   • Drug use: No   • Sexual activity: Defer       Vital Signs Range for the last 24 hours  Temperature: Temp:  [36.1 °C (97 °F)-37 °C (98.6 °F)] 36.1 °C (97 °F)   Temp Source: Temp src: Oral   BP: BP: (121-141)/(72-85) 141/76   Pulse: Heart Rate:  [70-85] 71   Respirations: Resp:  [16-18] 18   SPO2: SpO2:  [95 %-100 %] 98 %   O2 Amount (l/min):     O2 Devices Device (Oxygen Therapy): room air   Weight: Weight:  [69.8 kg (153 lb 14.1 oz)] 69.8 kg (153 lb 14.1 oz)     Flowsheet Rows    Flowsheet Row First Filed Value   Admission Height 157.5 cm (62\") Documented at 01/30/2023 1309   Admission Weight 68 kg (150 lb) Documented at 01/30/2023 1309          --------------------------------------------------------------------------------    Current Facility-Administered Medications   Medication Dose Route Frequency Provider Last Rate Last Admin   • acetaminophen (TYLENOL) tablet 650 mg  650 mg Oral Q4H PRN Kem Roa MD       • cyclobenzaprine (FLEXERIL) tablet 10 mg  10 mg Oral TID PRN Kem Roa MD       • dextrose (D50W) (25 g/50 mL) IV injection 25 g  25 g Intravenous Q15 Min PRN Kem oRa MD       • dextrose (GLUTOSE) oral gel 15 g  15 g Oral Q15 Min PRN Kem Roa MD       • gabapentin (NEURONTIN) capsule 300 mg  300 mg Oral TID Kem Roa MD   300 mg at 02/02/23 0837   • glucagon (human recombinant) (GLUCAGEN DIAGNOSTIC) injection 1 mg  1 mg Intramuscular Q15 Min PRN Kem Roa MD       • Hydrocortisone Sod Suc (PF) (Solu-CORTEF) injection 250 mg  250 mg Intravenous Q6H Kasandra Wilson APRN   250 mg at 02/02/23 0623   • HYDROmorphone (DILAUDID) injection 0.5 mg  0.5 mg Intravenous Q2H PRN Kem Roa MD   0.5 mg at 02/02/23 0640   • insulin lispro (ADMELOG) injection 0-9 Units  0-9 Units Subcutaneous TID AC Kem Roa MD   6 Units at 02/02/23 0837 "   • lisinopril (PRINIVIL,ZESTRIL) tablet 5 mg  5 mg Oral QAM Kme Roa MD   5 mg at 02/02/23 0837   • melatonin tablet 5 mg  5 mg Oral Nightly Kem Martino MD       • metoprolol succinate XL (TOPROL-XL) 24 hr tablet 75 mg  75 mg Oral Nightly Kem Roa MD   75 mg at 02/01/23 2133   • ondansetron (ZOFRAN) injection 4 mg  4 mg Intravenous Q6H PRN Kem Roa MD   4 mg at 02/02/23 0841   • oxyCODONE-acetaminophen (PERCOCET) 5-325 MG per tablet 1 tablet  1 tablet Oral Q8H Kem Martino MD       • oxyCODONE-acetaminophen (PERCOCET) 7.5-325 MG per tablet 1 tablet  1 tablet Oral Q4H Kem Martino MD   1 tablet at 02/02/23 0841   • pantoprazole (PROTONIX) EC tablet 40 mg  40 mg Oral Q AM Kem Roa MD   40 mg at 02/01/23 0437   • pravastatin (PRAVACHOL) tablet 10 mg  10 mg Oral Nightly Kem Roa MD   10 mg at 02/01/23 2132   • rOPINIRole (REQUIP) tablet 4 mg  4 mg Oral Nightly Kem Roa MD   4 mg at 02/01/23 2132   • sodium chloride 0.9 % flush 10 mL  10 mL Intravenous Q12H Kem Roa MD   10 mL at 02/02/23 0841   • sodium chloride 0.9 % flush 10 mL  10 mL Intravenous PRN Kem Roa MD       • sodium chloride 0.9 % infusion 40 mL  40 mL Intravenous Kem Martino MD           --------------------------------------------------------------------------------  Assessment & Plan      Anesthesia Evaluation     Patient summary reviewed and Nursing notes reviewed   history of anesthetic complications: PONV               Airway   Dental      Pulmonary - negative pulmonary ROS   Cardiovascular   Exercise tolerance: poor (<4 METS)    (+) hypertension, dysrhythmias, hyperlipidemia,       Neuro/Psych  (+) numbness,    GI/Hepatic/Renal/Endo    (+)  GERD,  diabetes mellitus,     ROS Comment: ibs    Musculoskeletal     (+) back pain, chronic pain,   Abdominal    Substance History - negative use     OB/GYN negative ob/gyn ROS         Other   arthritis,         Other Comment: ra             Diagnosis and Plan    Treatment Plan  Diagnosis     * Spinal stenosis of lumbar region with neurogenic claudication [M48.062]

## 2023-02-02 NOTE — CASE MANAGEMENT/SOCIAL WORK
Continued Stay Note  [facility]     Patient Name: Kathy Ramirez  MRN: [id number]  Today's Date: 2/2/2023    Admit Date: 1/30/2023    Plan: Home with home health vs. rehab pending PT/OT evals   Discharge Plan     Row Name 02/02/23 1024       Plan    Plan Home with home health vs. rehab pending PT/OT evals    Plan Comments CCP placed outbound call to Romy/Rubi to follow up on referral-LVM. Patient to have procedure today. CCP will follow for PT/OT evals to help determine disposition. Consuelo ANDREW               Discharge Codes    No documentation.               Expected Discharge Date and Time     Expected Discharge Date Expected Discharge Time    Feb 3, 2023             KAMRAN Gilmore

## 2023-02-03 ENCOUNTER — TELEPHONE (OUTPATIENT)
Dept: NEUROSURGERY | Facility: CLINIC | Age: 68
End: 2023-02-03
Payer: MEDICARE

## 2023-02-03 LAB
ANION GAP SERPL CALCULATED.3IONS-SCNC: 11.3 MMOL/L (ref 5–15)
BUN SERPL-MCNC: 22 MG/DL (ref 8–23)
BUN/CREAT SERPL: 23.4 (ref 7–25)
CALCIUM SPEC-SCNC: 8.7 MG/DL (ref 8.6–10.5)
CHLORIDE SERPL-SCNC: 101 MMOL/L (ref 98–107)
CO2 SERPL-SCNC: 23.7 MMOL/L (ref 22–29)
CREAT SERPL-MCNC: 0.94 MG/DL (ref 0.57–1)
DEPRECATED RDW RBC AUTO: 46.1 FL (ref 37–54)
EGFRCR SERPLBLD CKD-EPI 2021: 65.4 ML/MIN/1.73
ERYTHROCYTE [DISTWIDTH] IN BLOOD BY AUTOMATED COUNT: 14.3 % (ref 12.3–15.4)
GLUCOSE BLDC GLUCOMTR-MCNC: 326 MG/DL (ref 70–130)
GLUCOSE BLDC GLUCOMTR-MCNC: 335 MG/DL (ref 70–130)
GLUCOSE BLDC GLUCOMTR-MCNC: 387 MG/DL (ref 70–130)
GLUCOSE SERPL-MCNC: 374 MG/DL (ref 65–99)
HCT VFR BLD AUTO: 34 % (ref 34–46.6)
HGB BLD-MCNC: 10.6 G/DL (ref 12–15.9)
MCH RBC QN AUTO: 27.8 PG (ref 26.6–33)
MCHC RBC AUTO-ENTMCNC: 31.2 G/DL (ref 31.5–35.7)
MCV RBC AUTO: 89.2 FL (ref 79–97)
PLATELET # BLD AUTO: 177 10*3/MM3 (ref 140–450)
PMV BLD AUTO: 11.5 FL (ref 6–12)
POTASSIUM SERPL-SCNC: 4.2 MMOL/L (ref 3.5–5.2)
RBC # BLD AUTO: 3.81 10*6/MM3 (ref 3.77–5.28)
SODIUM SERPL-SCNC: 136 MMOL/L (ref 136–145)
WBC NRBC COR # BLD: 10.47 10*3/MM3 (ref 3.4–10.8)

## 2023-02-03 PROCEDURE — 25010000002 HYDROMORPHONE PER 4 MG: Performed by: HOSPITALIST

## 2023-02-03 PROCEDURE — 85027 COMPLETE CBC AUTOMATED: CPT | Performed by: HOSPITALIST

## 2023-02-03 PROCEDURE — 0 HYDROCORTISONE SOD SUC (PF) 250 MG RECONSTITUTED SOLUTION: Performed by: NURSE PRACTITIONER

## 2023-02-03 PROCEDURE — 63710000001 INSULIN LISPRO (HUMAN) PER 5 UNITS: Performed by: HOSPITALIST

## 2023-02-03 PROCEDURE — 80048 BASIC METABOLIC PNL TOTAL CA: CPT | Performed by: HOSPITALIST

## 2023-02-03 PROCEDURE — 82962 GLUCOSE BLOOD TEST: CPT

## 2023-02-03 PROCEDURE — 99231 SBSQ HOSP IP/OBS SF/LOW 25: CPT | Performed by: NURSE PRACTITIONER

## 2023-02-03 RX ORDER — POLYETHYLENE GLYCOL 3350 17 G/17G
17 POWDER, FOR SOLUTION ORAL DAILY
Status: DISCONTINUED | OUTPATIENT
Start: 2023-02-04 | End: 2023-02-03

## 2023-02-03 RX ORDER — POLYETHYLENE GLYCOL 3350 17 G/17G
17 POWDER, FOR SOLUTION ORAL NIGHTLY
Status: DISCONTINUED | OUTPATIENT
Start: 2023-02-03 | End: 2023-02-07 | Stop reason: HOSPADM

## 2023-02-03 RX ORDER — IBUPROFEN 600 MG/1
1 TABLET ORAL
Status: DISCONTINUED | OUTPATIENT
Start: 2023-02-03 | End: 2023-02-07 | Stop reason: HOSPADM

## 2023-02-03 RX ORDER — GLIPIZIDE 5 MG/1
5 TABLET ORAL
Status: DISCONTINUED | OUTPATIENT
Start: 2023-02-04 | End: 2023-02-07 | Stop reason: HOSPADM

## 2023-02-03 RX ORDER — GABAPENTIN 300 MG/1
600 CAPSULE ORAL 3 TIMES DAILY
Status: DISCONTINUED | OUTPATIENT
Start: 2023-02-03 | End: 2023-02-07 | Stop reason: HOSPADM

## 2023-02-03 RX ADMIN — HYDROCORTISONE SODIUM SUCCINATE 250 MG: 250 INJECTION, POWDER, FOR SOLUTION INTRAMUSCULAR; INTRAVENOUS at 05:46

## 2023-02-03 RX ADMIN — GABAPENTIN 300 MG: 300 CAPSULE ORAL at 15:41

## 2023-02-03 RX ADMIN — GABAPENTIN 600 MG: 300 CAPSULE ORAL at 20:28

## 2023-02-03 RX ADMIN — OXYCODONE HYDROCHLORIDE AND ACETAMINOPHEN 1 TABLET: 7.5; 325 TABLET ORAL at 17:44

## 2023-02-03 RX ADMIN — ROPINIROLE 4 MG: 2 TABLET, FILM COATED ORAL at 20:29

## 2023-02-03 RX ADMIN — PANTOPRAZOLE SODIUM 40 MG: 40 TABLET, DELAYED RELEASE ORAL at 05:46

## 2023-02-03 RX ADMIN — HYDROCORTISONE SODIUM SUCCINATE 250 MG: 250 INJECTION, POWDER, FOR SOLUTION INTRAMUSCULAR; INTRAVENOUS at 23:33

## 2023-02-03 RX ADMIN — LINAGLIPTIN 5 MG: 5 TABLET, FILM COATED ORAL at 17:44

## 2023-02-03 RX ADMIN — OXYCODONE HYDROCHLORIDE AND ACETAMINOPHEN 1 TABLET: 7.5; 325 TABLET ORAL at 21:45

## 2023-02-03 RX ADMIN — OXYCODONE HYDROCHLORIDE AND ACETAMINOPHEN 1 TABLET: 7.5; 325 TABLET ORAL at 13:19

## 2023-02-03 RX ADMIN — GABAPENTIN 300 MG: 300 CAPSULE ORAL at 08:46

## 2023-02-03 RX ADMIN — OXYCODONE HYDROCHLORIDE AND ACETAMINOPHEN 1 TABLET: 7.5; 325 TABLET ORAL at 03:42

## 2023-02-03 RX ADMIN — PRAVASTATIN SODIUM 10 MG: 10 TABLET ORAL at 20:28

## 2023-02-03 RX ADMIN — Medication 10 ML: at 20:28

## 2023-02-03 RX ADMIN — INSULIN LISPRO 7 UNITS: 100 INJECTION, SOLUTION INTRAVENOUS; SUBCUTANEOUS at 08:46

## 2023-02-03 RX ADMIN — HYDROMORPHONE HYDROCHLORIDE 0.5 MG: 1 INJECTION, SOLUTION INTRAMUSCULAR; INTRAVENOUS; SUBCUTANEOUS at 05:46

## 2023-02-03 RX ADMIN — METOPROLOL SUCCINATE 75 MG: 25 TABLET, EXTENDED RELEASE ORAL at 20:28

## 2023-02-03 RX ADMIN — LISINOPRIL 5 MG: 5 TABLET ORAL at 05:46

## 2023-02-03 RX ADMIN — Medication 10 ML: at 20:29

## 2023-02-03 RX ADMIN — POLYETHYLENE GLYCOL 3350 17 G: 17 POWDER, FOR SOLUTION ORAL at 21:45

## 2023-02-03 RX ADMIN — HYDROCORTISONE SODIUM SUCCINATE 250 MG: 250 INJECTION, POWDER, FOR SOLUTION INTRAMUSCULAR; INTRAVENOUS at 13:06

## 2023-02-03 RX ADMIN — INSULIN LISPRO 8 UNITS: 100 INJECTION, SOLUTION INTRAVENOUS; SUBCUTANEOUS at 11:22

## 2023-02-03 RX ADMIN — HYDROCORTISONE SODIUM SUCCINATE 250 MG: 250 INJECTION, POWDER, FOR SOLUTION INTRAMUSCULAR; INTRAVENOUS at 17:08

## 2023-02-03 RX ADMIN — Medication 10 ML: at 08:47

## 2023-02-03 RX ADMIN — INSULIN LISPRO 7 UNITS: 100 INJECTION, SOLUTION INTRAVENOUS; SUBCUTANEOUS at 17:08

## 2023-02-03 NOTE — PROGRESS NOTES
"DAILY PROGRESS NOTE  [facility]    Patient Identification:  Name: Kathy Ramirez  Age: 68 y.o.  Sex: female  :  1955  MRN: [id number]         Primary Care Physician: Lucy Turcios APRN    Subjective:  Interval History:She complains of pain.    Objective:    Scheduled Meds:gabapentin, 600 mg, Oral, TID  hydrocortisone sodium succinate, 250 mg, Intravenous, Q6H  insulin lispro, 0-9 Units, Subcutaneous, TID AC  lisinopril, 5 mg, Oral, QAM  metoprolol succinate XL, 75 mg, Oral, Nightly  pantoprazole, 40 mg, Oral, Q AM  pravastatin, 10 mg, Oral, Nightly  rOPINIRole, 4 mg, Oral, Nightly  sodium chloride, 10 mL, Intravenous, Q12H  sodium chloride, 10 mL, Intravenous, Q12H      Continuous Infusions:     Vital signs in last 24 hours:  Temp:  [97 °F (36.1 °C)-98.3 °F (36.8 °C)] 98.3 °F (36.8 °C)  Heart Rate:  [60-76] 60  Resp:  [16-18] 16  BP: (115-170)/(61-82) 170/79    Intake/Output:    Intake/Output Summary (Last 24 hours) at 2/3/2023 1624  Last data filed at 2/3/2023 1300  Gross per 24 hour   Intake 1140 ml   Output --   Net 1140 ml       Exam:  /79 (BP Location: Right arm, Patient Position: Sitting)   Pulse 60   Temp 98.3 °F (36.8 °C) (Oral)   Resp 16   Ht 157.5 cm (62\")   Wt 69.4 kg (153 lb)   SpO2 96%   BMI 27.98 kg/m²     General Appearance:    Alert, cooperative, no distress   Head:    Normocephalic, without obvious abnormality, atraumatic   Eyes:       Throat:   Lips, tongue, gums normal   Neck:   Supple, symmetrical, trachea midline, no JVD   Lungs:     Clear to auscultation bilaterally, respirations unlabored   Chest Wall:    No tenderness or deformity    Heart:    Regular rate and rhythm, S1 and S2 normal, no murmur,no  Rub or gallop   Abdomen:     Soft, nontender, bowel sounds active, no masses, no organomegaly    Extremities:   Extremities normal, atraumatic, no cyanosis or edema   Pulses:      Skin:   Skin is warm and dry,  no rashes or palpable lesions   Neurologic:   no " focal deficits noted      Lab Results (last 72 hours)     Procedure Component Value Units Date/Time    POC Glucose Once [[id number]]  (Abnormal) Collected: 02/03/23 1117    Specimen: Blood Updated: 02/03/23 1119     Glucose 387 mg/dL      Comment: Meter: [id number] : [id number] Siddhartha Wells EARNEST       POC Glucose Once [[id number]]  (Abnormal) Collected: 02/03/23 0627    Specimen: Blood Updated: 02/03/23 0629     Glucose 335 mg/dL      Comment: Meter: [id number] : [id number] Brandt TINOCO       CBC (No Diff) [[id number]]  (Abnormal) Collected: 02/03/23 0501    Specimen: Blood Updated: 02/03/23 0613     WBC 10.47 10*3/mm3      RBC 3.81 10*6/mm3      Hemoglobin 10.6 g/dL      Hematocrit 34.0 %      MCV 89.2 fL      MCH 27.8 pg      MCHC 31.2 g/dL      RDW 14.3 %      RDW-SD 46.1 fl      MPV 11.5 fL      Platelets 177 10*3/mm3     Basic Metabolic Panel [[id number]]  (Abnormal) Collected: 02/03/23 0501    Specimen: Blood Updated: 02/03/23 0553     Glucose 374 mg/dL      BUN 22 mg/dL      Creatinine 0.94 mg/dL      Sodium 136 mmol/L      Potassium 4.2 mmol/L      Chloride 101 mmol/L      CO2 23.7 mmol/L      Calcium 8.7 mg/dL      BUN/Creatinine Ratio 23.4     Anion Gap 11.3 mmol/L      eGFR 65.4 mL/min/1.73     Narrative:      GFR Normal >60  Chronic Kidney Disease <60  Kidney Failure <15      POC Glucose Once [[id number]]  (Abnormal) Collected: 02/02/23 2057    Specimen: Blood Updated: 02/02/23 2059     Glucose 284 mg/dL      Comment: Meter: [id number] : [id number] Brandt TINOCO       POC Glucose Once [[id number]]  (Abnormal) Collected: 02/02/23 1621    Specimen: Blood Updated: 02/02/23 1622     Glucose 306 mg/dL      Comment: Meter: [id number] : [id number] Quoc TINOCO       Basic Metabolic Panel [[id number]]  (Abnormal) Collected: 02/02/23 1252    Specimen: Blood Updated: 02/02/23 1329     Glucose 315 mg/dL      BUN 17 mg/dL      Creatinine 0.96 mg/dL      Sodium 137 mmol/L      Potassium 4.2 mmol/L       Chloride 100 mmol/L      CO2 24.2 mmol/L      Calcium 8.9 mg/dL      BUN/Creatinine Ratio 17.7     Anion Gap 12.8 mmol/L      eGFR 63.8 mL/min/1.73     Narrative:      GFR Normal >60  Chronic Kidney Disease <60  Kidney Failure <15      Hemoglobin A1c [[id number]]  (Abnormal) Collected: 02/02/23 1252    Specimen: Blood Updated: 02/02/23 1324     Hemoglobin A1C 7.40 %     Narrative:      Hemoglobin A1C Ranges:    Increased Risk for Diabetes  5.7% to 6.4%  Diabetes                     >= 6.5%  Diabetic Goal                < 7.0%    CBC (No Diff) [[id number]]  (Abnormal) Collected: 02/02/23 1252    Specimen: Blood Updated: 02/02/23 1311     WBC 6.17 10*3/mm3      RBC 4.30 10*6/mm3      Hemoglobin 11.7 g/dL      Hematocrit 37.2 %      MCV 86.5 fL      MCH 27.2 pg      MCHC 31.5 g/dL      RDW 13.9 %      RDW-SD 44.0 fl      MPV 11.2 fL      Platelets 217 10*3/mm3     POC Glucose Once [[id number]]  (Abnormal) Collected: 02/02/23 1119    Specimen: Blood Updated: 02/02/23 1122     Glucose 226 mg/dL      Comment: Meter: [id number] : [id number] Quoc Green NA       POC Glucose Once [[id number]]  (Abnormal) Collected: 02/02/23 0911    Specimen: Blood Updated: 02/02/23 1010     Glucose 252 mg/dL      Comment: Meter: [id number] : [id number] Mohsen CHARLES RN       POC Glucose Once [[id number]]  (Abnormal) Collected: 02/02/23 0600    Specimen: Blood Updated: 02/02/23 0602     Glucose 296 mg/dL      Comment: Meter: [id number] : [id number] Peter Lino NA       POC Glucose Once [[id number]]  (Normal) Collected: 02/01/23 1602    Specimen: Blood Updated: 02/01/23 1604     Glucose 121 mg/dL      Comment: Meter: [id number] : [id number] Babak Kothari NA       POC Glucose Once [[id number]]  (Abnormal) Collected: 02/01/23 1107    Specimen: Blood Updated: 02/01/23 1109     Glucose 163 mg/dL      Comment: Meter: [id number] : [id number] Babak TINOCO       POC Glucose Once [[id number]]  (Abnormal) Collected:  02/01/23 0547    Specimen: Blood Updated: 02/01/23 0548     Glucose 169 mg/dL      Comment: Meter: [id number] : [id number] Peter TINOCO       POC Glucose Once [[id number]]  (Normal) Collected: 01/31/23 1700    Specimen: Blood Updated: 01/31/23 1701     Glucose 124 mg/dL      Comment: Meter: [id number] : [id number] Babak TINOCO           Data Review:  Results from last 7 days   Lab Units 02/03/23  0501 02/02/23  1252 01/31/23  1023   SODIUM mmol/L 136 137 135*   POTASSIUM mmol/L 4.2 4.2 3.8   CHLORIDE mmol/L 101 100 101   CO2 mmol/L 23.7 24.2 20.4*   BUN mg/dL 22 17 22   CREATININE mg/dL 0.94 0.96 0.92   GLUCOSE mg/dL 374* 315* 165*   CALCIUM mg/dL 8.7 8.9 9.4     Results from last 7 days   Lab Units 02/03/23  0501 02/02/23  1252 01/31/23  1023   WBC 10*3/mm3 10.47 6.17 5.39   HEMOGLOBIN g/dL 10.6* 11.7* 13.3   HEMATOCRIT % 34.0 37.2 41.9   PLATELETS 10*3/mm3 177 217 189         Results from last 7 days   Lab Units 02/02/23  1252   HEMOGLOBIN A1C % 7.40*     No results found for: TROPONINT            Invalid input(s): PROT, LABALBU      Results from last 7 days   Lab Units 02/02/23  1252   HEMOGLOBIN A1C % 7.40*     Glucose   Date/Time Value Ref Range Status   02/03/2023 1117 387 (H) 70 - 130 mg/dL Final     Comment:     Meter: [id number] : [id number] Siddhartha TINOCO   02/03/2023 0627 335 (H) 70 - 130 mg/dL Final     Comment:     Meter: [id number] : [id number] Brandt TINOCO   02/02/2023 2057 284 (H) 70 - 130 mg/dL Final     Comment:     Meter: [id number] : [id number] Brandt TINOCO   02/02/2023 1621 306 (H) 70 - 130 mg/dL Final     Comment:     Meter: [id number] : [id number] Quoc Green EARNEST   02/02/2023 1119 226 (H) 70 - 130 mg/dL Final     Comment:     Meter: [id number] : [id number] Quoc Green EARNEST   02/02/2023 0911 252 (H) 70 - 130 mg/dL Final     Comment:     Meter: [id number] : [id number] Mohsen CHARLES RN   02/02/2023 0600 296 (H) 70 - 130 mg/dL Final      Comment:     Meter: [id number] : [id number] Peter TINOCO   02/01/2023 1602 121 70 - 130 mg/dL Final     Comment:     Meter: [id number] : [id number] Babak Kothari EARNEST           Past Medical History:   Diagnosis Date   • Arm pain     RIGHT   • Chronic low back pain    • DDD (degenerative disc disease), lumbar    • Diabetes mellitus (HCC)     TYPE 2   • Dysrhythmia    • GERD (gastroesophageal reflux disease)    • Herpes     GETS FEVER BLISTERS AT TIMES   • History of echocardiogram 11/13/2019    EF 68% Grade 1 diastolic dysfucntion. Mild AR. Trace TR.    • History of hepatitis A     1970   • History of transfusion    • Humerus shaft fracture     RIGHT   • Hyperlipidemia    • Hypertension    • IBS (irritable bowel syndrome)    • Lumbar canal stenosis    • Lumbar radiculopathy    • PONV (postoperative nausea and vomiting)    • Psoriasis    • Psoriatic arthritis (HCC)    • Rheumatoid arthritis (HCC)    • RLS (restless legs syndrome)    • Sinus tachycardia        Assessment:  Active Hospital Problems    Diagnosis  POA   • **Back pain [M54.9]  Yes   • Low back pain [M54.50]  Yes   • Diabetes mellitus (HCC) [E11.9]  Yes   • Hypertension [I10]  Yes   • Rheumatoid arthritis (HCC) [M06.9]  Yes   • Spinal stenosis, lumbar region, with neurogenic claudication [M48.062]  Yes   • Lumbar radiculitis [M54.16]  Unknown      Resolved Hospital Problems   No resolved problems to display.       Plan:  Increase neurontin to 600 mg tid which is her home dose. DC planning. Probably home tomorrow and follow up with pain management for Epidural steroid injection.    Navin Mai MD  2/3/2023  16:24 EST

## 2023-02-03 NOTE — TELEPHONE ENCOUNTER
Patient called stated she gave Dr. Rahman the incorrect name to her pain clinic in [facility]. The Correct name is [facility] She sees PA By the Kaya Giron.

## 2023-02-03 NOTE — DISCHARGE PLACEMENT REQUEST
"Kathy Ramirez (68 y.o. Female)     Date of Birth   1955    Social Security Number       Address   [address] [place] [address]    Home Phone   [phone]    MRN   [id number]       Islam   None    Marital Status                               Admission Date   1/30/23    Admission Type   Emergency    Admitting Provider   Kem Roa MD    Attending Provider   Navin Mai MD    Department, Room/Bed   [address], 82/[address]       Discharge Date       Discharge Disposition       Discharge Destination                               Attending Provider: Navin aMi MD    Allergies: No Known Allergies    Isolation: None   Infection: None   Code Status: CPR    Ht: 157.5 cm (62\")   Wt: 69.4 kg (153 lb)    Admission Cmt: None   Principal Problem: Back pain [M54.9]                 Active Insurance as of 1/30/2023     Primary Coverage     Payor Plan Insurance Group Employer/Plan Group    MEDICARE MEDICARE A & B      Payor Plan Address Payor Plan Phone Number Payor Plan Fax Number Effective Dates    [address] [phone]  1/1/2018 - None Entered    [facility] [id number]       Subscriber Name Subscriber Birth Date Member ID       KATHY RAMIREZ 1955 [id number]           Secondary Coverage     Payor Plan Insurance Group Employer/Plan Group    AARP  SUP AARP HEALTH CARE OPTIONS [id number]     Payor Plan Address Payor Plan Phone Number Payor Plan Fax Number Effective Dates    [facility] [phone]  4/1/2021 - None Entered    [address]       [facility] [address]       Subscriber Name Subscriber Birth Date Member ID       KATHY RAMIREZ 1955 [id number]                 Emergency Contacts      (Rel.) Home Phone Work Phone Mobile Phone    Guilherme Ramirez (Spouse) -- -- [phone]              "

## 2023-02-03 NOTE — PLAN OF CARE
Goal Outcome Evaluation:  Plan of Care Reviewed With: patient           Outcome Evaluation: Patient continues with back pain but states its better. PRN medication given for pain providing some relief. Poss d/c today with HH. All questions answered with verbalized understanding.

## 2023-02-03 NOTE — PROGRESS NOTES
NEUROSURGERY PROGRESS NOTE     LOS: 3 days   Patient Care Team:  Lucy Turcios APRN as PCP - General (Nurse Practitioner)  Russel Spear MD as Consulting Physician (Cardiology)  Robbie Clark MD as Consulting Physician (Rheumatology)  Bee Nick MD as Consulting Physician (Endocrinology)    Chief Complaint:  Back pain and left > right leg pain. F/U afer lumbar FELICIANO.     Subjective       Interval History: Evaluated by PT. Tolerated PT well. Ambulated 80 ft with Rwx and SBA x 1. Feeling back to baseline level of mobility.     History taken from: patient chart    Objective      Vital Signs  Temp:  [96.6 °F (35.9 °C)-97.7 °F (36.5 °C)] 97.2 °F (36.2 °C)  Heart Rate:  [69-78] 76  Resp:  [16-18] 18  BP: (115-159)/(61-82) 146/72     Physical Exam:     Sitting up in bed.  Appears comfortable.  No motor or sensory deficit on exam.    Assessment & Plan       Back pain    Spinal stenosis, lumbar region, with neurogenic claudication    Lumbar radiculitis    Diabetes mellitus (HCC)    Hypertension    Rheumatoid arthritis (HCC)    Low back pain    Dr. Rahman spoke with the patient last evening.  He did not recommend surgery at this point but preferred to continue conservative management.  The patient was encouraged to follow-up with her pain management doctor for a repeat lumbar epidural steroid injection and then follow-up with Dr. Rahman at a later date.  Our office will contact the patient for follow-up appointments.  Patient was encouraged to call our office if she experiences any worsening pain symptoms or has any questions or concerns.  Please call neurosurgery if needed.     CECILIA Mitchell  02/03/23  11:57 EST

## 2023-02-03 NOTE — PROGRESS NOTES
Discharge Planning Assessment  [facility]     Patient Name: Kathy Ramirez  MRN: [id number]  Today's Date: 2/3/2023    Admit Date: 1/30/2023    Plan: Home with home health vs. rehab pending PT/OT [place]   Discharge Needs Assessment    No documentation.                Discharge Plan     Row Name 02/03/23 1701       Plan    Plan Comments Spoke with Dr. Mai and the discharge plan is for tomorrow. He placed order for a full electric hospital bed and for a scooter. Karan/Nicole met with the family and explained that it is a process to get a scooter and the patient would like for her to start that. She will need the bed delivered to her home address. Let met with with Karan that the patient is going home on 2/4/23. The patient is trying to set up her an appointment with her current pain clinic. PRIMO Shirley RN CCP              Continued Care and Services - Admitted Since 1/30/2023     Destination     Service Provider Request Status Selected Services Address Phone Fax Patient Preferred    [facility] Considering N/A [address] [facility] [id number] [phone] --          Durable Medical Equipment     Service Provider Request Status Selected Services Address Phone Fax Patient Preferred    [facility] - Request Sent N/A [address] [place] [id number] [phone] --          Home Medical Care     Service Provider Request Status Selected Services Address Phone Fax Patient Preferred    [facility] N[facility]A [address] [address] [phone] --    [facility] Accepted N/A [address] [address] [phone] --              Expected Discharge Date and Time     Expected Discharge Date Expected Discharge Time    Feb 4, 2023          Demographic Summary    No documentation.                Functional  Status    No documentation.                Psychosocial    No documentation.                Abuse/Neglect    No documentation.                Legal    No documentation.                Substance Abuse    No documentation.                Patient Forms    No documentation.                   Chantale Shirley RN

## 2023-02-04 LAB
ANION GAP SERPL CALCULATED.3IONS-SCNC: 11.7 MMOL/L (ref 5–15)
BUN SERPL-MCNC: 21 MG/DL (ref 8–23)
BUN/CREAT SERPL: 24.7 (ref 7–25)
CALCIUM SPEC-SCNC: 9 MG/DL (ref 8.6–10.5)
CHLORIDE SERPL-SCNC: 101 MMOL/L (ref 98–107)
CO2 SERPL-SCNC: 24.3 MMOL/L (ref 22–29)
CREAT SERPL-MCNC: 0.85 MG/DL (ref 0.57–1)
DEPRECATED RDW RBC AUTO: 47.5 FL (ref 37–54)
EGFRCR SERPLBLD CKD-EPI 2021: 73.8 ML/MIN/1.73
ERYTHROCYTE [DISTWIDTH] IN BLOOD BY AUTOMATED COUNT: 14.6 % (ref 12.3–15.4)
GLUCOSE BLDC GLUCOMTR-MCNC: 129 MG/DL (ref 70–130)
GLUCOSE BLDC GLUCOMTR-MCNC: 287 MG/DL (ref 70–130)
GLUCOSE BLDC GLUCOMTR-MCNC: 405 MG/DL (ref 70–130)
GLUCOSE SERPL-MCNC: 397 MG/DL (ref 65–99)
HCT VFR BLD AUTO: 35.5 % (ref 34–46.6)
HGB BLD-MCNC: 11 G/DL (ref 12–15.9)
MCH RBC QN AUTO: 28 PG (ref 26.6–33)
MCHC RBC AUTO-ENTMCNC: 31 G/DL (ref 31.5–35.7)
MCV RBC AUTO: 90.3 FL (ref 79–97)
PLATELET # BLD AUTO: 177 10*3/MM3 (ref 140–450)
PMV BLD AUTO: 11.7 FL (ref 6–12)
POTASSIUM SERPL-SCNC: 4 MMOL/L (ref 3.5–5.2)
RBC # BLD AUTO: 3.93 10*6/MM3 (ref 3.77–5.28)
SODIUM SERPL-SCNC: 137 MMOL/L (ref 136–145)
WBC NRBC COR # BLD: 12.65 10*3/MM3 (ref 3.4–10.8)

## 2023-02-04 PROCEDURE — 63710000001 METHYLPREDNISOLONE 4 MG TABLET THERAPY PACK 21 EACH DISP PACK: Performed by: HOSPITALIST

## 2023-02-04 PROCEDURE — 63710000001 INSULIN LISPRO (HUMAN) PER 5 UNITS: Performed by: HOSPITALIST

## 2023-02-04 PROCEDURE — 85027 COMPLETE CBC AUTOMATED: CPT | Performed by: HOSPITALIST

## 2023-02-04 PROCEDURE — 82962 GLUCOSE BLOOD TEST: CPT

## 2023-02-04 PROCEDURE — 0 HYDROCORTISONE SOD SUC (PF) 250 MG RECONSTITUTED SOLUTION: Performed by: NURSE PRACTITIONER

## 2023-02-04 PROCEDURE — 80048 BASIC METABOLIC PNL TOTAL CA: CPT | Performed by: HOSPITALIST

## 2023-02-04 RX ORDER — METHYLPREDNISOLONE 4 MG/1
24 TABLET ORAL ONCE
Status: COMPLETED | OUTPATIENT
Start: 2023-02-04 | End: 2023-02-04

## 2023-02-04 RX ORDER — METHYLPREDNISOLONE 4 MG/1
4 TABLET ORAL
Status: DISCONTINUED | OUTPATIENT
Start: 2023-02-09 | End: 2023-02-07 | Stop reason: HOSPADM

## 2023-02-04 RX ORDER — METHYLPREDNISOLONE 4 MG/1
4 TABLET ORAL
Status: DISCONTINUED | OUTPATIENT
Start: 2023-02-08 | End: 2023-02-07 | Stop reason: HOSPADM

## 2023-02-04 RX ORDER — METHYLPREDNISOLONE 4 MG/1
4 TABLET ORAL
Status: COMPLETED | OUTPATIENT
Start: 2023-02-07 | End: 2023-02-07

## 2023-02-04 RX ORDER — INSULIN LISPRO 100 [IU]/ML
10 INJECTION, SOLUTION INTRAVENOUS; SUBCUTANEOUS ONCE
Status: COMPLETED | OUTPATIENT
Start: 2023-02-04 | End: 2023-02-04

## 2023-02-04 RX ORDER — METHYLPREDNISOLONE 4 MG/1
4 TABLET ORAL
Status: COMPLETED | OUTPATIENT
Start: 2023-02-06 | End: 2023-02-06

## 2023-02-04 RX ORDER — METHYLPREDNISOLONE 4 MG/1
4 TABLET ORAL
Status: COMPLETED | OUTPATIENT
Start: 2023-02-05 | End: 2023-02-05

## 2023-02-04 RX ORDER — METHYLPREDNISOLONE 4 MG/1
8 TABLET ORAL
Status: COMPLETED | OUTPATIENT
Start: 2023-02-05 | End: 2023-02-05

## 2023-02-04 RX ADMIN — PANTOPRAZOLE SODIUM 40 MG: 40 TABLET, DELAYED RELEASE ORAL at 06:37

## 2023-02-04 RX ADMIN — OXYCODONE HYDROCHLORIDE AND ACETAMINOPHEN 1 TABLET: 7.5; 325 TABLET ORAL at 08:17

## 2023-02-04 RX ADMIN — INSULIN LISPRO 10 UNITS: 100 INJECTION, SOLUTION INTRAVENOUS; SUBCUTANEOUS at 08:05

## 2023-02-04 RX ADMIN — LINAGLIPTIN 5 MG: 5 TABLET, FILM COATED ORAL at 08:06

## 2023-02-04 RX ADMIN — HYDROCORTISONE SODIUM SUCCINATE 250 MG: 250 INJECTION, POWDER, FOR SOLUTION INTRAMUSCULAR; INTRAVENOUS at 12:14

## 2023-02-04 RX ADMIN — OXYCODONE HYDROCHLORIDE AND ACETAMINOPHEN 1 TABLET: 7.5; 325 TABLET ORAL at 20:56

## 2023-02-04 RX ADMIN — METHYLPREDNISOLONE 24 MG: 4 TABLET ORAL at 15:16

## 2023-02-04 RX ADMIN — GLIPIZIDE 5 MG: 5 TABLET ORAL at 06:37

## 2023-02-04 RX ADMIN — INSULIN LISPRO 9 UNITS: 100 INJECTION, SOLUTION INTRAVENOUS; SUBCUTANEOUS at 06:36

## 2023-02-04 RX ADMIN — HYDROCORTISONE SODIUM SUCCINATE 250 MG: 250 INJECTION, POWDER, FOR SOLUTION INTRAMUSCULAR; INTRAVENOUS at 06:35

## 2023-02-04 RX ADMIN — ROPINIROLE 4 MG: 2 TABLET, FILM COATED ORAL at 20:55

## 2023-02-04 RX ADMIN — INSULIN LISPRO 6 UNITS: 100 INJECTION, SOLUTION INTRAVENOUS; SUBCUTANEOUS at 16:58

## 2023-02-04 RX ADMIN — CYCLOBENZAPRINE 10 MG: 10 TABLET, FILM COATED ORAL at 04:36

## 2023-02-04 RX ADMIN — Medication 10 ML: at 20:57

## 2023-02-04 RX ADMIN — POLYETHYLENE GLYCOL 3350 17 G: 17 POWDER, FOR SOLUTION ORAL at 20:55

## 2023-02-04 RX ADMIN — OXYCODONE HYDROCHLORIDE AND ACETAMINOPHEN 1 TABLET: 7.5; 325 TABLET ORAL at 04:13

## 2023-02-04 RX ADMIN — Medication 10 ML: at 08:06

## 2023-02-04 RX ADMIN — PRAVASTATIN SODIUM 10 MG: 10 TABLET ORAL at 20:57

## 2023-02-04 RX ADMIN — GABAPENTIN 600 MG: 300 CAPSULE ORAL at 16:49

## 2023-02-04 RX ADMIN — GABAPENTIN 600 MG: 300 CAPSULE ORAL at 20:56

## 2023-02-04 RX ADMIN — Medication 10 ML: at 12:14

## 2023-02-04 RX ADMIN — OXYCODONE HYDROCHLORIDE AND ACETAMINOPHEN 1 TABLET: 7.5; 325 TABLET ORAL at 15:15

## 2023-02-04 RX ADMIN — METOPROLOL SUCCINATE 75 MG: 25 TABLET, EXTENDED RELEASE ORAL at 20:56

## 2023-02-04 RX ADMIN — LISINOPRIL 5 MG: 5 TABLET ORAL at 06:37

## 2023-02-04 RX ADMIN — GABAPENTIN 600 MG: 300 CAPSULE ORAL at 08:06

## 2023-02-04 RX ADMIN — Medication 10 ML: at 20:55

## 2023-02-04 NOTE — PROGRESS NOTES
"DAILY PROGRESS NOTE  [facility]    Patient Identification:  Name: Kathy Ramirez  Age: 68 y.o.  Sex: female  :  1955  MRN: [id number]         Primary Care Physician: Lucy Turcios APRN    Subjective:  Interval History:She complains of pain.    Objective:    Scheduled Meds:gabapentin, 600 mg, Oral, TID  glipizide, 5 mg, Oral, QAM AC  insulin lispro, 0-9 Units, Subcutaneous, TID AC  linagliptin, 5 mg, Oral, Daily  lisinopril, 5 mg, Oral, QAM  methylPREDNISolone, 24 mg, Oral, Once  [START ON 2023] methylPREDNISolone, 4 mg, Oral, TID Around Food  [START ON 2023] methylPREDNISolone, 4 mg, Oral, 4x Daily Taper  [START ON 2023] methylPREDNISolone, 4 mg, Oral, TID Around Food  [START ON 2023] methylPREDNISolone, 4 mg, Oral, Before Breakfast  [START ON 2023] methylPREDNISolone, 4 mg, Oral, Tonight  [START ON 2023] methylPREDNISolone, 4 mg, Oral, Before Breakfast  [START ON 2023] methylPREDNISolone, 8 mg, Oral, Tonight  metoprolol succinate XL, 75 mg, Oral, Nightly  pantoprazole, 40 mg, Oral, Q AM  polyethylene glycol, 17 g, Oral, Nightly  pravastatin, 10 mg, Oral, Nightly  rOPINIRole, 4 mg, Oral, Nightly  sodium chloride, 10 mL, Intravenous, Q12H  sodium chloride, 10 mL, Intravenous, Q12H      Continuous Infusions:     Vital signs in last 24 hours:  Temp:  [97.1 °F (36.2 °C)-98.3 °F (36.8 °C)] 97.8 °F (36.6 °C)  Heart Rate:  [60-72] 65  Resp:  [16] 16  BP: (134-172)/(70-85) 134/73    Intake/Output:    Intake/Output Summary (Last 24 hours) at 2023 1238  Last data filed at 2023 0817  Gross per 24 hour   Intake 1540 ml   Output --   Net 1540 ml       Exam:  /73 (BP Location: Right arm, Patient Position: Sitting)   Pulse 65   Temp 97.8 °F (36.6 °C) (Oral)   Resp 16   Ht 157.5 cm (62\")   Wt 71.3 kg (157 lb 3 oz)   SpO2 99%   BMI 28.75 kg/m²     General Appearance:    Alert, cooperative, no distress   Head:    Normocephalic, without obvious abnormality, " atraumatic   Eyes:       Throat:   Lips, tongue, gums normal   Neck:   Supple, symmetrical, trachea midline, no JVD   Lungs:     Clear to auscultation bilaterally, respirations unlabored   Chest Wall:    No tenderness or deformity    Heart:    Regular rate and rhythm, S1 and S2 normal, no murmur,no  Rub or gallop   Abdomen:     Soft, nontender, bowel sounds active, no masses, no organomegaly    Extremities:   Extremities normal, atraumatic, no cyanosis or edema   Pulses:      Skin:   Skin is warm and dry,  no rashes or palpable lesions   Neurologic:   no focal deficits noted      Lab Results (last 72 hours)     Procedure Component Value Units Date/Time    POC Glucose Once [[id number]]  (Abnormal) Collected: 02/03/23 1117    Specimen: Blood Updated: 02/03/23 1119     Glucose 387 mg/dL      Comment: Meter: [id number] : [id number] Siddhartha TINOCO       POC Glucose Once [[id number]]  (Abnormal) Collected: 02/03/23 0627    Specimen: Blood Updated: 02/03/23 0629     Glucose 335 mg/dL      Comment: Meter: [id number] : [id number] Brandt TINOCO       CBC (No Diff) [[id number]]  (Abnormal) Collected: 02/03/23 0501    Specimen: Blood Updated: 02/03/23 0613     WBC 10.47 10*3/mm3      RBC 3.81 10*6/mm3      Hemoglobin 10.6 g/dL      Hematocrit 34.0 %      MCV 89.2 fL      MCH 27.8 pg      MCHC 31.2 g/dL      RDW 14.3 %      RDW-SD 46.1 fl      MPV 11.5 fL      Platelets 177 10*3/mm3     Basic Metabolic Panel [[id number]]  (Abnormal) Collected: 02/03/23 0501    Specimen: Blood Updated: 02/03/23 0553     Glucose 374 mg/dL      BUN 22 mg/dL      Creatinine 0.94 mg/dL      Sodium 136 mmol/L      Potassium 4.2 mmol/L      Chloride 101 mmol/L      CO2 23.7 mmol/L      Calcium 8.7 mg/dL      BUN/Creatinine Ratio 23.4     Anion Gap 11.3 mmol/L      eGFR 65.4 mL/min/1.73     Narrative:      GFR Normal >60  Chronic Kidney Disease <60  Kidney Failure <15      POC Glucose Once [[id number]]  (Abnormal) Collected: 02/02/23 2057     Specimen: Blood Updated: 02/02/23 2059     Glucose 284 mg/dL      Comment: Meter: [id number] : [id number] Brandt TINOCO       POC Glucose Once [[id number]]  (Abnormal) Collected: 02/02/23 1621    Specimen: Blood Updated: 02/02/23 1622     Glucose 306 mg/dL      Comment: Meter: [id number] : [id number] Quoc TINOCO       Basic Metabolic Panel [[id number]]  (Abnormal) Collected: 02/02/23 1252    Specimen: Blood Updated: 02/02/23 1329     Glucose 315 mg/dL      BUN 17 mg/dL      Creatinine 0.96 mg/dL      Sodium 137 mmol/L      Potassium 4.2 mmol/L      Chloride 100 mmol/L      CO2 24.2 mmol/L      Calcium 8.9 mg/dL      BUN/Creatinine Ratio 17.7     Anion Gap 12.8 mmol/L      eGFR 63.8 mL/min/1.73     Narrative:      GFR Normal >60  Chronic Kidney Disease <60  Kidney Failure <15      Hemoglobin A1c [[id number]]  (Abnormal) Collected: 02/02/23 1252    Specimen: Blood Updated: 02/02/23 1324     Hemoglobin A1C 7.40 %     Narrative:      Hemoglobin A1C Ranges:    Increased Risk for Diabetes  5.7% to 6.4%  Diabetes                     >= 6.5%  Diabetic Goal                < 7.0%    CBC (No Diff) [[id number]]  (Abnormal) Collected: 02/02/23 1252    Specimen: Blood Updated: 02/02/23 1311     WBC 6.17 10*3/mm3      RBC 4.30 10*6/mm3      Hemoglobin 11.7 g/dL      Hematocrit 37.2 %      MCV 86.5 fL      MCH 27.2 pg      MCHC 31.5 g/dL      RDW 13.9 %      RDW-SD 44.0 fl      MPV 11.2 fL      Platelets 217 10*3/mm3     POC Glucose Once [[id number]]  (Abnormal) Collected: 02/02/23 1119    Specimen: Blood Updated: 02/02/23 1122     Glucose 226 mg/dL      Comment: Meter: [id number] : [id number] Quoc TINOCO       POC Glucose Once [[id number]]  (Abnormal) Collected: 02/02/23 0911    Specimen: Blood Updated: 02/02/23 1010     Glucose 252 mg/dL      Comment: Meter: [id number] : [id number] Mohsen CHARLES RN       POC Glucose Once [[id number]]  (Abnormal) Collected: 02/02/23 0600    Specimen: Blood Updated: 02/02/23  0602     Glucose 296 mg/dL      Comment: Meter: [id number] : [id number] Green Zoie NA       POC Glucose Once [[id number]]  (Normal) Collected: 02/01/23 1602    Specimen: Blood Updated: 02/01/23 1604     Glucose 121 mg/dL      Comment: Meter: [id number] : [id number] Hilliard Nataranzaa NA       POC Glucose Once [[id number]]  (Abnormal) Collected: 02/01/23 1107    Specimen: Blood Updated: 02/01/23 1109     Glucose 163 mg/dL      Comment: Meter: [id number] : [id number] Hilliard Natoshia NA       POC Glucose Once [[id number]]  (Abnormal) Collected: 02/01/23 0547    Specimen: Blood Updated: 02/01/23 0548     Glucose 169 mg/dL      Comment: Meter: [id number] : [id number] Green Zoie NA       POC Glucose Once [[id number]]  (Normal) Collected: 01/31/23 1700    Specimen: Blood Updated: 01/31/23 1701     Glucose 124 mg/dL      Comment: Meter: [id number] : [id number] Hilliard Rocioaranzaemilia TINOCO           Data Review:  Results from last 7 days   Lab Units 02/04/23  0656 02/03/23  0501 02/02/23  1252   SODIUM mmol/L 137 136 137   POTASSIUM mmol/L 4.0 4.2 4.2   CHLORIDE mmol/L 101 101 100   CO2 mmol/L 24.3 23.7 24.2   BUN mg/dL 21 22 17   CREATININE mg/dL 0.85 0.94 0.96   GLUCOSE mg/dL 397* 374* 315*   CALCIUM mg/dL 9.0 8.7 8.9     Results from last 7 days   Lab Units 02/04/23  0656 02/03/23  0501 02/02/23  1252   WBC 10*3/mm3 12.65* 10.47 6.17   HEMOGLOBIN g/dL 11.0* 10.6* 11.7*   HEMATOCRIT % 35.5 34.0 37.2   PLATELETS 10*3/mm3 177 177 217         Results from last 7 days   Lab Units 02/02/23  1252   HEMOGLOBIN A1C % 7.40*     No results found for: TROPONINT            Invalid input(s): PROT, LABALBU      Results from last 7 days   Lab Units 02/02/23  1252   HEMOGLOBIN A1C % 7.40*     Glucose   Date/Time Value Ref Range Status   02/04/2023 1149 129 70 - 130 mg/dL Final     Comment:     Meter: [id number] : [id number] Yasmany Peraza RN   02/04/2023 0621 405 (C) 70 - 130 mg/dL Final     Comment:      Confirmed by Repeat RN Notified R and V Meter: [id number] : [id number] Campb   02/03/2023 1659 326 (H) 70 - 130 mg/dL Final     Comment:     Meter: [id number] : [id number] Siddhartha Wells NA   02/03/2023 1117 387 (H) 70 - 130 mg/dL Final     Comment:     Meter: [id number] : [id number] Siddhartha Wells NA   02/03/2023 0627 335 (H) 70 - 130 mg/dL Final     Comment:     Meter: [id number] : [id number] Brandt Bolton NA   02/02/2023 2057 284 (H) 70 - 130 mg/dL Final     Comment:     Meter: [id number] : [id number] Brandt Bolton NA   02/02/2023 1621 306 (H) 70 - 130 mg/dL Final     Comment:     Meter: [id number] : [id number] Quoc Green NA   02/02/2023 1119 226 (H) 70 - 130 mg/dL Final     Comment:     Meter: [id number] : [id number] Quoc Green NA           Past Medical History:   Diagnosis Date   • Arm pain     RIGHT   • Chronic low back pain    • DDD (degenerative disc disease), lumbar    • Diabetes mellitus (HCC)     TYPE 2   • Dysrhythmia    • GERD (gastroesophageal reflux disease)    • Herpes     GETS FEVER BLISTERS AT TIMES   • History of echocardiogram 11/13/2019    EF 68% Grade 1 diastolic dysfucntion. Mild AR. Trace TR.    • History of hepatitis A     1970   • History of transfusion    • Humerus shaft fracture     RIGHT   • Hyperlipidemia    • Hypertension    • IBS (irritable bowel syndrome)    • Lumbar canal stenosis    • Lumbar radiculopathy    • PONV (postoperative nausea and vomiting)    • Psoriasis    • Psoriatic arthritis (HCC)    • Rheumatoid arthritis (HCC)    • RLS (restless legs syndrome)    • Sinus tachycardia        Assessment:  Active Hospital Problems    Diagnosis  POA   • **Back pain [M54.9]  Yes   • Low back pain [M54.50]  Yes   • Diabetes mellitus (HCC) [E11.9]  Yes   • Hypertension [I10]  Yes   • Rheumatoid arthritis (HCC) [M06.9]  Yes   • Spinal stenosis, lumbar region, with neurogenic claudication [M48.062]  Yes   • Lumbar radiculitis [M54.16]  Unknown      Resolved  Hospital Problems   No resolved problems to display.       Plan:  Continue neurontin to 600 mg tid which is her home dose. DC planning. Probably home tomorrow and follow up with pain management for Epidural steroid injection.  Back on oral DM meds and DC solucortef and go with medrol dose kavon. May need insulin at home.    Navin Mai MD  2/4/2023  12:38 EST

## 2023-02-04 NOTE — PLAN OF CARE
Alert, vss. PRN meds given for pain, see MAR. Up to bedside commode independently. IV saline locked. Mirlax given.       Problem: Adult Inpatient Plan of Care  Goal: Absence of Hospital-Acquired Illness or Injury  Intervention: Prevent Skin Injury  Recent Flowsheet Documentation  Taken 2/4/2023 0406 by Jessica Palm RN  Body Position:   left   side-lying  Taken 2/4/2023 0155 by Jessica Palm RN  Body Position:   position changed independently   sitting up in bed  Taken 2/3/2023 2350 by Jessica Palm RN  Body Position:   position changed independently   sitting up in bed  Taken 2/3/2023 2146 by Jessica Palm RN  Body Position: sitting up in bed  Taken 2/3/2023 1945 by Jessica Palm RN  Body Position: sitting up in bed  Skin Protection: adhesive use limited   Goal Outcome Evaluation:

## 2023-02-04 NOTE — PLAN OF CARE
"Occupational Therapy Evaluation completed.   Functional Status: A&Ox4. BLE cellulitis. Prefers to sit EOB vs UIC. Encouraged elevation of LE's for some time. Seated grooming with Sup/SBA. LE clothing N/A due to large BLE wounds; dressings in place; seepage noted. Toileting with urinal, Mod Indep. STS with Alda. ADL transfer with CGA. Walks in room with FWW, O2, SBA/CGA.      Plan of Care: Patient with no further skilled OT needs in the acute care setting at this time. Pt's primary needs are wound care, antibiotics, progressive ambulation.   Discharge Recommendations:  Equipment: Will Continue to Assess for Equipment Needs. Post-acute therapy recommended before discharged home.  Lives alone.   See \"Rehab Therapy-Acute\" Patient Summary Report for complete documentation.    " Goal Outcome Evaluation:           Progress: no change  Outcome Evaluation: Pt rested throughout the shift, taking PRN Percocet for pain with good effect. Asked for Gabapentin dose to be adjusted to 600 mg as she takes that dose at home, new orders placed per Dr. Mai. ACHS FS per orders. Pt very anxious about discharging tomorrow and managing her pain needs at home. Medical equipment ordered for home. Requested Miralax order, will start tonight. VSS, will CTM

## 2023-02-04 NOTE — PROGRESS NOTES
Called by nursing staff for elevated blood sugars.  Blood sugars are in the 400s this morning.  She already received 9 units of sliding scale we will going to give additional 10 units now.  She is due to get glipizide and Tradjenta.  Will defer today MD rounding whether appropriate to initiate basal insulin.  Electronically signed by John Means MD, 02/04/23, 6:47 AM EST.

## 2023-02-04 NOTE — PLAN OF CARE
Goal Outcome Evaluation:  Plan of Care Reviewed With: patient        Progress: no change  Outcome Evaluation: Up to shower, and sat in chair much of the day.    Medicated for pain prn.  Steroids IV are now changed to p.o.   Accалександрs had been in 300s last day or so.   May be d/c tomorrow, and is waiting on medical bed etc to be delivered to her home on Monday.  Using walker.  Small BM today.

## 2023-02-05 LAB
GLUCOSE BLDC GLUCOMTR-MCNC: 146 MG/DL (ref 70–130)
GLUCOSE BLDC GLUCOMTR-MCNC: 278 MG/DL (ref 70–130)
GLUCOSE BLDC GLUCOMTR-MCNC: 321 MG/DL (ref 70–130)

## 2023-02-05 PROCEDURE — 63710000001 INSULIN LISPRO (HUMAN) PER 5 UNITS: Performed by: HOSPITALIST

## 2023-02-05 PROCEDURE — 25010000002 HYDROMORPHONE PER 4 MG: Performed by: HOSPITALIST

## 2023-02-05 PROCEDURE — 82962 GLUCOSE BLOOD TEST: CPT

## 2023-02-05 PROCEDURE — 63710000001 METHYLPREDNISOLONE 4 MG TABLET THERAPY PACK 21 EACH DISP PACK: Performed by: HOSPITALIST

## 2023-02-05 RX ORDER — AMLODIPINE BESYLATE 5 MG/1
5 TABLET ORAL
Status: DISCONTINUED | OUTPATIENT
Start: 2023-02-05 | End: 2023-02-06

## 2023-02-05 RX ORDER — LISINOPRIL 5 MG/1
5 TABLET ORAL EVERY MORNING
Status: DISCONTINUED | OUTPATIENT
Start: 2023-02-05 | End: 2023-02-07 | Stop reason: HOSPADM

## 2023-02-05 RX ADMIN — AMLODIPINE BESYLATE 5 MG: 5 TABLET ORAL at 14:06

## 2023-02-05 RX ADMIN — INSULIN LISPRO 7 UNITS: 100 INJECTION, SOLUTION INTRAVENOUS; SUBCUTANEOUS at 08:03

## 2023-02-05 RX ADMIN — OXYCODONE HYDROCHLORIDE AND ACETAMINOPHEN 1 TABLET: 7.5; 325 TABLET ORAL at 02:27

## 2023-02-05 RX ADMIN — GLIPIZIDE 5 MG: 5 TABLET ORAL at 06:34

## 2023-02-05 RX ADMIN — METHYLPREDNISOLONE 4 MG: 4 TABLET ORAL at 06:33

## 2023-02-05 RX ADMIN — METHYLPREDNISOLONE 4 MG: 4 TABLET ORAL at 18:04

## 2023-02-05 RX ADMIN — GABAPENTIN 600 MG: 300 CAPSULE ORAL at 08:02

## 2023-02-05 RX ADMIN — Medication 10 ML: at 08:03

## 2023-02-05 RX ADMIN — CYCLOBENZAPRINE 10 MG: 10 TABLET, FILM COATED ORAL at 03:12

## 2023-02-05 RX ADMIN — OXYCODONE HYDROCHLORIDE AND ACETAMINOPHEN 1 TABLET: 7.5; 325 TABLET ORAL at 15:35

## 2023-02-05 RX ADMIN — METHYLPREDNISOLONE 4 MG: 4 TABLET ORAL at 12:54

## 2023-02-05 RX ADMIN — INSULIN LISPRO 6 UNITS: 100 INJECTION, SOLUTION INTRAVENOUS; SUBCUTANEOUS at 11:47

## 2023-02-05 RX ADMIN — OXYCODONE HYDROCHLORIDE AND ACETAMINOPHEN 1 TABLET: 7.5; 325 TABLET ORAL at 19:42

## 2023-02-05 RX ADMIN — PRAVASTATIN SODIUM 10 MG: 10 TABLET ORAL at 21:03

## 2023-02-05 RX ADMIN — INSULIN GLARGINE-YFGN 10 UNITS: 100 INJECTION, SOLUTION SUBCUTANEOUS at 14:06

## 2023-02-05 RX ADMIN — LINAGLIPTIN 5 MG: 5 TABLET, FILM COATED ORAL at 08:02

## 2023-02-05 RX ADMIN — LISINOPRIL 5 MG: 5 TABLET ORAL at 04:48

## 2023-02-05 RX ADMIN — METOPROLOL SUCCINATE 75 MG: 25 TABLET, EXTENDED RELEASE ORAL at 21:02

## 2023-02-05 RX ADMIN — OXYCODONE HYDROCHLORIDE AND ACETAMINOPHEN 1 TABLET: 7.5; 325 TABLET ORAL at 06:38

## 2023-02-05 RX ADMIN — HYDROMORPHONE HYDROCHLORIDE 0.5 MG: 1 INJECTION, SOLUTION INTRAMUSCULAR; INTRAVENOUS; SUBCUTANEOUS at 03:11

## 2023-02-05 RX ADMIN — ROPINIROLE 4 MG: 2 TABLET, FILM COATED ORAL at 21:03

## 2023-02-05 RX ADMIN — PANTOPRAZOLE SODIUM 40 MG: 40 TABLET, DELAYED RELEASE ORAL at 06:38

## 2023-02-05 RX ADMIN — METHYLPREDNISOLONE 8 MG: 4 TABLET ORAL at 21:04

## 2023-02-05 RX ADMIN — GABAPENTIN 600 MG: 300 CAPSULE ORAL at 16:17

## 2023-02-05 RX ADMIN — GABAPENTIN 600 MG: 300 CAPSULE ORAL at 21:05

## 2023-02-05 RX ADMIN — POLYETHYLENE GLYCOL 3350 17 G: 17 POWDER, FOR SOLUTION ORAL at 21:03

## 2023-02-05 NOTE — PLAN OF CARE
Goal Outcome Evaluation:  Plan of Care Reviewed With: patient        Progress: improving  Outcome Evaluation: AOx4, up in chair, ambulating often in room with her walker from home.  Medicated for pain in right foot.  Taking p.o. steroids now (IV stopped) and glucose is improved.  Started on long acting insulin today.  BMX2 today. Plan is to have medical equipment delivered to her home tomorrow and be d/c home tomorrow.

## 2023-02-05 NOTE — PROGRESS NOTES
"DAILY PROGRESS NOTE  [facility]    Patient Identification:  Name: Kathy Ramirez  Age: 68 y.o.  Sex: female  :  1955  MRN: [id number]         Primary Care Physician: Lucy Turcios APRN    Subjective:  Interval History:She complains of pain.    Objective:    Scheduled Meds:gabapentin, 600 mg, Oral, TID  glipizide, 5 mg, Oral, QAM AC  insulin lispro, 0-9 Units, Subcutaneous, TID AC  linagliptin, 5 mg, Oral, Daily  lisinopril, 5 mg, Oral, QAM  methylPREDNISolone, 4 mg, Oral, TID Around Food  [START ON 2023] methylPREDNISolone, 4 mg, Oral, 4x Daily Taper  [START ON 2023] methylPREDNISolone, 4 mg, Oral, TID Around Food  [START ON 2023] methylPREDNISolone, 4 mg, Oral, Before Breakfast  [START ON 2023] methylPREDNISolone, 4 mg, Oral, Tonight  [START ON 2023] methylPREDNISolone, 4 mg, Oral, Before Breakfast  methylPREDNISolone, 8 mg, Oral, Tonight  metoprolol succinate XL, 75 mg, Oral, Nightly  pantoprazole, 40 mg, Oral, Q AM  polyethylene glycol, 17 g, Oral, Nightly  pravastatin, 10 mg, Oral, Nightly  rOPINIRole, 4 mg, Oral, Nightly  sodium chloride, 10 mL, Intravenous, Q12H  sodium chloride, 10 mL, Intravenous, Q12H      Continuous Infusions:     Vital signs in last 24 hours:  Temp:  [96.6 °F (35.9 °C)-97.5 °F (36.4 °C)] 97.4 °F (36.3 °C)  Heart Rate:  [64-80] 75  Resp:  [16-18] 16  BP: (115-195)/(80-96) 146/80    Intake/Output:    Intake/Output Summary (Last 24 hours) at 2023 1239  Last data filed at 2023 0900  Gross per 24 hour   Intake  ml   Output --   Net  ml       Exam:  /80 (BP Location: Right arm, Patient Position: Lying)   Pulse 75   Temp 97.4 °F (36.3 °C) (Oral)   Resp 16   Ht 157.5 cm (62\")   Wt 71.1 kg (156 lb 12 oz)   SpO2 96%   BMI 28.67 kg/m²     General Appearance:    Alert, cooperative, no distress   Head:    Normocephalic, without obvious abnormality, atraumatic   Eyes:       Throat:   Lips, tongue, gums normal   Neck:   Supple, " symmetrical, trachea midline, no JVD   Lungs:     Clear to auscultation bilaterally, respirations unlabored   Chest Wall:    No tenderness or deformity    Heart:    Regular rate and rhythm, S1 and S2 normal, no murmur,no  Rub or gallop   Abdomen:     Soft, nontender, bowel sounds active, no masses, no organomegaly    Extremities:   Extremities normal, atraumatic, no cyanosis or edema   Pulses:      Skin:   Skin is warm and dry,  no rashes or palpable lesions   Neurologic:   no focal deficits noted      Lab Results (last 72 hours)     Procedure Component Value Units Date/Time    POC Glucose Once [[id number]]  (Abnormal) Collected: 02/03/23 1117    Specimen: Blood Updated: 02/03/23 1119     Glucose 387 mg/dL      Comment: Meter: [id number] : [id number] Siddhartha TINOCO       POC Glucose Once [[id number]]  (Abnormal) Collected: 02/03/23 0627    Specimen: Blood Updated: 02/03/23 0629     Glucose 335 mg/dL      Comment: Meter: [id number] : [id number] Brandt TINOCO       CBC (No Diff) [[id number]]  (Abnormal) Collected: 02/03/23 0501    Specimen: Blood Updated: 02/03/23 0613     WBC 10.47 10*3/mm3      RBC 3.81 10*6/mm3      Hemoglobin 10.6 g/dL      Hematocrit 34.0 %      MCV 89.2 fL      MCH 27.8 pg      MCHC 31.2 g/dL      RDW 14.3 %      RDW-SD 46.1 fl      MPV 11.5 fL      Platelets 177 10*3/mm3     Basic Metabolic Panel [[id number]]  (Abnormal) Collected: 02/03/23 0501    Specimen: Blood Updated: 02/03/23 0553     Glucose 374 mg/dL      BUN 22 mg/dL      Creatinine 0.94 mg/dL      Sodium 136 mmol/L      Potassium 4.2 mmol/L      Chloride 101 mmol/L      CO2 23.7 mmol/L      Calcium 8.7 mg/dL      BUN/Creatinine Ratio 23.4     Anion Gap 11.3 mmol/L      eGFR 65.4 mL/min/1.73     Narrative:      GFR Normal >60  Chronic Kidney Disease <60  Kidney Failure <15      POC Glucose Once [[id number]]  (Abnormal) Collected: 02/02/23 2057    Specimen: Blood Updated: 02/02/23 2059     Glucose 284 mg/dL      Comment: Meter:  [id number] : [id number] Brandt TINOCO       POC Glucose Once [[id number]]  (Abnormal) Collected: 02/02/23 1621    Specimen: Blood Updated: 02/02/23 1622     Glucose 306 mg/dL      Comment: Meter: [id number] : [id number] Quoc Green EARNEST       Basic Metabolic Panel [[id number]]  (Abnormal) Collected: 02/02/23 1252    Specimen: Blood Updated: 02/02/23 1329     Glucose 315 mg/dL      BUN 17 mg/dL      Creatinine 0.96 mg/dL      Sodium 137 mmol/L      Potassium 4.2 mmol/L      Chloride 100 mmol/L      CO2 24.2 mmol/L      Calcium 8.9 mg/dL      BUN/Creatinine Ratio 17.7     Anion Gap 12.8 mmol/L      eGFR 63.8 mL/min/1.73     Narrative:      GFR Normal >60  Chronic Kidney Disease <60  Kidney Failure <15      Hemoglobin A1c [[id number]]  (Abnormal) Collected: 02/02/23 1252    Specimen: Blood Updated: 02/02/23 1324     Hemoglobin A1C 7.40 %     Narrative:      Hemoglobin A1C Ranges:    Increased Risk for Diabetes  5.7% to 6.4%  Diabetes                     >= 6.5%  Diabetic Goal                < 7.0%    CBC (No Diff) [[id number]]  (Abnormal) Collected: 02/02/23 1252    Specimen: Blood Updated: 02/02/23 1311     WBC 6.17 10*3/mm3      RBC 4.30 10*6/mm3      Hemoglobin 11.7 g/dL      Hematocrit 37.2 %      MCV 86.5 fL      MCH 27.2 pg      MCHC 31.5 g/dL      RDW 13.9 %      RDW-SD 44.0 fl      MPV 11.2 fL      Platelets 217 10*3/mm3     POC Glucose Once [[id number]]  (Abnormal) Collected: 02/02/23 1119    Specimen: Blood Updated: 02/02/23 1122     Glucose 226 mg/dL      Comment: Meter: [id number] : [id number] Quoc Green EARNEST       POC Glucose Once [[id number]]  (Abnormal) Collected: 02/02/23 0911    Specimen: Blood Updated: 02/02/23 1010     Glucose 252 mg/dL      Comment: Meter: [id number] : [id number] Mohsen CHARLES RN       POC Glucose Once [[id number]]  (Abnormal) Collected: 02/02/23 0600    Specimen: Blood Updated: 02/02/23 0602     Glucose 296 mg/dL      Comment: Meter: [id number] : [id number]  Peter Zoie NA       POC Glucose Once [[id number]]  (Normal) Collected: 02/01/23 1602    Specimen: Blood Updated: 02/01/23 1604     Glucose 121 mg/dL      Comment: Meter: [id number] : [id number] Babak Chanaranzaemilia TINOCO       POC Glucose Once [[id number]]  (Abnormal) Collected: 02/01/23 1107    Specimen: Blood Updated: 02/01/23 1109     Glucose 163 mg/dL      Comment: Meter: [id number] : [id number] Babak Jessicaa NA       POC Glucose Once [[id number]]  (Abnormal) Collected: 02/01/23 0547    Specimen: Blood Updated: 02/01/23 0548     Glucose 169 mg/dL      Comment: Meter: [id number] : [id number] Peter Zoie NA       POC Glucose Once [[id number]]  (Normal) Collected: 01/31/23 1700    Specimen: Blood Updated: 01/31/23 1701     Glucose 124 mg/dL      Comment: Meter: [id number] : [id number] Babak TINOCO           Data Review:  Results from last 7 days   Lab Units 02/04/23  0656 02/03/23  0501 02/02/23  1252   SODIUM mmol/L 137 136 137   POTASSIUM mmol/L 4.0 4.2 4.2   CHLORIDE mmol/L 101 101 100   CO2 mmol/L 24.3 23.7 24.2   BUN mg/dL 21 22 17   CREATININE mg/dL 0.85 0.94 0.96   GLUCOSE mg/dL 397* 374* 315*   CALCIUM mg/dL 9.0 8.7 8.9     Results from last 7 days   Lab Units 02/04/23  0656 02/03/23  0501 02/02/23  1252   WBC 10*3/mm3 12.65* 10.47 6.17   HEMOGLOBIN g/dL 11.0* 10.6* 11.7*   HEMATOCRIT % 35.5 34.0 37.2   PLATELETS 10*3/mm3 177 177 217         Results from last 7 days   Lab Units 02/02/23  1252   HEMOGLOBIN A1C % 7.40*     No results found for: TROPONINT            Invalid input(s): PROT, LABALBU      Results from last 7 days   Lab Units 02/02/23  1252   HEMOGLOBIN A1C % 7.40*     Glucose   Date/Time Value Ref Range Status   02/05/2023 1129 278 (H) 70 - 130 mg/dL Final     Comment:     Meter: [id number] : [id number] Salbador TINOCO   02/05/2023 0628 321 (H) 70 - 130 mg/dL Final     Comment:     Meter: [id number] : [id number] Peter TINOCO   02/04/2023 1651 287 (H)  70 - 130 mg/dL Final     Comment:     Meter: [id number] : [id number] Linakacie Karmen RN   02/04/2023 1149 129 70 - 130 mg/dL Final     Comment:     Meter: [id number] : [id number] Linakacie Karmen RN   02/04/2023 0621 405 (C) 70 - 130 mg/dL Final     Comment:     Confirmed by Repeat RN Notified R and V Meter: [id number] : [id number] Campb   02/03/2023 1659 326 (H) 70 - 130 mg/dL Final     Comment:     Meter: [id number] : [id number] Siddhartha Wells NA   02/03/2023 1117 387 (H) 70 - 130 mg/dL Final     Comment:     Meter: [id number] : [id number] Alvaronherbert Wells NA   02/03/2023 0627 335 (H) 70 - 130 mg/dL Final     Comment:     Meter: [id number] : [id number] Brandt TINOCO           Past Medical History:   Diagnosis Date   • Arm pain     RIGHT   • Chronic low back pain    • DDD (degenerative disc disease), lumbar    • Diabetes mellitus (HCC)     TYPE 2   • Dysrhythmia    • GERD (gastroesophageal reflux disease)    • Herpes     GETS FEVER BLISTERS AT TIMES   • History of echocardiogram 11/13/2019    EF 68% Grade 1 diastolic dysfucntion. Mild AR. Trace TR.    • History of hepatitis A     1970   • History of transfusion    • Humerus shaft fracture     RIGHT   • Hyperlipidemia    • Hypertension    • IBS (irritable bowel syndrome)    • Lumbar canal stenosis    • Lumbar radiculopathy    • PONV (postoperative nausea and vomiting)    • Psoriasis    • Psoriatic arthritis (HCC)    • Rheumatoid arthritis (HCC)    • RLS (restless legs syndrome)    • Sinus tachycardia        Assessment:  Active Hospital Problems    Diagnosis  POA   • **Back pain [M54.9]  Yes   • Low back pain [M54.50]  Yes   • Diabetes mellitus (HCC) [E11.9]  Yes   • Hypertension [I10]  Yes   • Rheumatoid arthritis (HCC) [M06.9]  Yes   • Spinal stenosis, lumbar region, with neurogenic claudication [M48.062]  Yes   • Lumbar radiculitis [M54.16]  Unknown      Resolved Hospital Problems   No resolved problems to display.       Plan:  Continue neurontin to  600 mg tid which is her home dose. DC planning. Probably home tomorrow and follow up with pain management for Epidural steroid injection.  Back on oral DM meds and DC solucortef and go with medrol dose kavon. May need insulin at home.  Add Lantus and Norvasc.    Navin Mai MD  2/5/2023  12:39 EST

## 2023-02-06 LAB
ANION GAP SERPL CALCULATED.3IONS-SCNC: 8.1 MMOL/L (ref 5–15)
BASOPHILS # BLD AUTO: 0.01 10*3/MM3 (ref 0–0.2)
BASOPHILS NFR BLD AUTO: 0.1 % (ref 0–1.5)
BUN SERPL-MCNC: 24 MG/DL (ref 8–23)
BUN/CREAT SERPL: 23.1 (ref 7–25)
CALCIUM SPEC-SCNC: 8.7 MG/DL (ref 8.6–10.5)
CHLORIDE SERPL-SCNC: 98 MMOL/L (ref 98–107)
CO2 SERPL-SCNC: 28.9 MMOL/L (ref 22–29)
CREAT SERPL-MCNC: 1.04 MG/DL (ref 0.57–1)
DEPRECATED RDW RBC AUTO: 46 FL (ref 37–54)
EGFRCR SERPLBLD CKD-EPI 2021: 57.9 ML/MIN/1.73
EOSINOPHIL # BLD AUTO: 0 10*3/MM3 (ref 0–0.4)
EOSINOPHIL NFR BLD AUTO: 0 % (ref 0.3–6.2)
ERYTHROCYTE [DISTWIDTH] IN BLOOD BY AUTOMATED COUNT: 14.4 % (ref 12.3–15.4)
GLUCOSE BLDC GLUCOMTR-MCNC: 229 MG/DL (ref 70–130)
GLUCOSE BLDC GLUCOMTR-MCNC: 259 MG/DL (ref 70–130)
GLUCOSE BLDC GLUCOMTR-MCNC: 352 MG/DL (ref 70–130)
GLUCOSE SERPL-MCNC: 385 MG/DL (ref 65–99)
HCT VFR BLD AUTO: 35.4 % (ref 34–46.6)
HGB BLD-MCNC: 10.8 G/DL (ref 12–15.9)
IMM GRANULOCYTES # BLD AUTO: 0.06 10*3/MM3 (ref 0–0.05)
IMM GRANULOCYTES NFR BLD AUTO: 0.7 % (ref 0–0.5)
LYMPHOCYTES # BLD AUTO: 0.85 10*3/MM3 (ref 0.7–3.1)
LYMPHOCYTES NFR BLD AUTO: 10.2 % (ref 19.6–45.3)
MCH RBC QN AUTO: 27.2 PG (ref 26.6–33)
MCHC RBC AUTO-ENTMCNC: 30.5 G/DL (ref 31.5–35.7)
MCV RBC AUTO: 89.2 FL (ref 79–97)
MONOCYTES # BLD AUTO: 0.69 10*3/MM3 (ref 0.1–0.9)
MONOCYTES NFR BLD AUTO: 8.3 % (ref 5–12)
NEUTROPHILS NFR BLD AUTO: 6.75 10*3/MM3 (ref 1.7–7)
NEUTROPHILS NFR BLD AUTO: 80.7 % (ref 42.7–76)
NRBC BLD AUTO-RTO: 0 /100 WBC (ref 0–0.2)
PLATELET # BLD AUTO: 176 10*3/MM3 (ref 140–450)
PMV BLD AUTO: 12.2 FL (ref 6–12)
POTASSIUM SERPL-SCNC: 4.9 MMOL/L (ref 3.5–5.2)
RBC # BLD AUTO: 3.97 10*6/MM3 (ref 3.77–5.28)
SODIUM SERPL-SCNC: 135 MMOL/L (ref 136–145)
WBC NRBC COR # BLD: 8.36 10*3/MM3 (ref 3.4–10.8)

## 2023-02-06 PROCEDURE — 63710000001 INSULIN LISPRO (HUMAN) PER 5 UNITS: Performed by: HOSPITALIST

## 2023-02-06 PROCEDURE — 63710000001 METHYLPREDNISOLONE 4 MG TABLET THERAPY PACK 21 EACH DISP PACK: Performed by: HOSPITALIST

## 2023-02-06 PROCEDURE — 25010000002 HYDRALAZINE PER 20 MG: Performed by: HOSPITALIST

## 2023-02-06 PROCEDURE — 85025 COMPLETE CBC W/AUTO DIFF WBC: CPT | Performed by: HOSPITALIST

## 2023-02-06 PROCEDURE — 82962 GLUCOSE BLOOD TEST: CPT

## 2023-02-06 PROCEDURE — 80048 BASIC METABOLIC PNL TOTAL CA: CPT | Performed by: HOSPITALIST

## 2023-02-06 RX ORDER — HYDRALAZINE HYDROCHLORIDE 20 MG/ML
10 INJECTION INTRAMUSCULAR; INTRAVENOUS EVERY 6 HOURS PRN
Status: DISCONTINUED | OUTPATIENT
Start: 2023-02-06 | End: 2023-02-07 | Stop reason: HOSPADM

## 2023-02-06 RX ORDER — AMLODIPINE BESYLATE 10 MG/1
10 TABLET ORAL
Status: DISCONTINUED | OUTPATIENT
Start: 2023-02-07 | End: 2023-02-07 | Stop reason: HOSPADM

## 2023-02-06 RX ORDER — AMLODIPINE BESYLATE 5 MG/1
5 TABLET ORAL ONCE
Status: COMPLETED | OUTPATIENT
Start: 2023-02-06 | End: 2023-02-06

## 2023-02-06 RX ADMIN — METHYLPREDNISOLONE 4 MG: 4 TABLET ORAL at 06:30

## 2023-02-06 RX ADMIN — POLYETHYLENE GLYCOL 3350 17 G: 17 POWDER, FOR SOLUTION ORAL at 20:47

## 2023-02-06 RX ADMIN — OXYCODONE HYDROCHLORIDE AND ACETAMINOPHEN 1 TABLET: 7.5; 325 TABLET ORAL at 06:20

## 2023-02-06 RX ADMIN — METHYLPREDNISOLONE 4 MG: 4 TABLET ORAL at 18:06

## 2023-02-06 RX ADMIN — INSULIN LISPRO 8 UNITS: 100 INJECTION, SOLUTION INTRAVENOUS; SUBCUTANEOUS at 08:30

## 2023-02-06 RX ADMIN — GABAPENTIN 600 MG: 300 CAPSULE ORAL at 08:30

## 2023-02-06 RX ADMIN — GABAPENTIN 600 MG: 300 CAPSULE ORAL at 15:27

## 2023-02-06 RX ADMIN — METHYLPREDNISOLONE 4 MG: 4 TABLET ORAL at 20:48

## 2023-02-06 RX ADMIN — HYDRALAZINE HYDROCHLORIDE 10 MG: 20 INJECTION INTRAMUSCULAR; INTRAVENOUS at 18:18

## 2023-02-06 RX ADMIN — ROPINIROLE 4 MG: 2 TABLET, FILM COATED ORAL at 20:48

## 2023-02-06 RX ADMIN — Medication 10 ML: at 20:49

## 2023-02-06 RX ADMIN — METHYLPREDNISOLONE 4 MG: 4 TABLET ORAL at 13:23

## 2023-02-06 RX ADMIN — LISINOPRIL 5 MG: 5 TABLET ORAL at 06:20

## 2023-02-06 RX ADMIN — AMLODIPINE BESYLATE 5 MG: 5 TABLET ORAL at 16:42

## 2023-02-06 RX ADMIN — INSULIN GLARGINE-YFGN 20 UNITS: 100 INJECTION, SOLUTION SUBCUTANEOUS at 20:48

## 2023-02-06 RX ADMIN — INSULIN LISPRO 6 UNITS: 100 INJECTION, SOLUTION INTRAVENOUS; SUBCUTANEOUS at 18:06

## 2023-02-06 RX ADMIN — OXYCODONE HYDROCHLORIDE AND ACETAMINOPHEN 1 TABLET: 7.5; 325 TABLET ORAL at 11:21

## 2023-02-06 RX ADMIN — PANTOPRAZOLE SODIUM 40 MG: 40 TABLET, DELAYED RELEASE ORAL at 06:19

## 2023-02-06 RX ADMIN — GABAPENTIN 600 MG: 300 CAPSULE ORAL at 20:48

## 2023-02-06 RX ADMIN — GLIPIZIDE 5 MG: 5 TABLET ORAL at 06:30

## 2023-02-06 RX ADMIN — PRAVASTATIN SODIUM 10 MG: 10 TABLET ORAL at 20:48

## 2023-02-06 RX ADMIN — OXYCODONE HYDROCHLORIDE AND ACETAMINOPHEN 1 TABLET: 7.5; 325 TABLET ORAL at 19:55

## 2023-02-06 RX ADMIN — AMLODIPINE BESYLATE 5 MG: 5 TABLET ORAL at 08:30

## 2023-02-06 RX ADMIN — INSULIN LISPRO 4 UNITS: 100 INJECTION, SOLUTION INTRAVENOUS; SUBCUTANEOUS at 11:21

## 2023-02-06 RX ADMIN — INSULIN GLARGINE-YFGN 10 UNITS: 100 INJECTION, SOLUTION SUBCUTANEOUS at 06:20

## 2023-02-06 RX ADMIN — ACETAMINOPHEN 650 MG: 325 TABLET, FILM COATED ORAL at 18:06

## 2023-02-06 RX ADMIN — LINAGLIPTIN 5 MG: 5 TABLET, FILM COATED ORAL at 08:30

## 2023-02-06 RX ADMIN — OXYCODONE HYDROCHLORIDE AND ACETAMINOPHEN 1 TABLET: 7.5; 325 TABLET ORAL at 01:02

## 2023-02-06 RX ADMIN — METOPROLOL SUCCINATE 75 MG: 25 TABLET, EXTENDED RELEASE ORAL at 20:47

## 2023-02-06 NOTE — PLAN OF CARE
Alert x 4. Room air. Ambulates with walker. Pain meds given PRN, see MAR. Plans to DC today. IV removed 2/5/23 per pt request. Tolerating diet well.     Problem: Adult Inpatient Plan of Care  Goal: Absence of Hospital-Acquired Illness or Injury  Intervention: Prevent Skin Injury  Recent Flowsheet Documentation  Taken 2/5/2023 6862 by Jessica Palm RN  Body Position: sitting up in bed  Taken 2/5/2023 2155 by Jessica Palm RN  Body Position:   position changed independently   sitting up in bed  Taken 2/5/2023 1944 by Jessica Palm, RN  Body Position: sitting up in bed  Skin Protection: adhesive use limited   Goal Outcome Evaluation:

## 2023-02-06 NOTE — PROGRESS NOTES
"DAILY PROGRESS NOTE  [facility]    Patient Identification:  Name: Kathy Ramirez  Age: 68 y.o.  Sex: female  :  1955  MRN: [id number]         Primary Care Physician: Lucy Turcios APRN    Subjective:  Interval History:She complains of pain.    Objective:    Scheduled Meds:[START ON 2023] amLODIPine, 10 mg, Oral, Q24H  amLODIPine, 5 mg, Oral, Once  gabapentin, 600 mg, Oral, TID  glipizide, 5 mg, Oral, QAM AC  insulin glargine, 20 Units, Subcutaneous, Nightly  insulin lispro, 0-9 Units, Subcutaneous, TID AC  linagliptin, 5 mg, Oral, Daily  lisinopril, 5 mg, Oral, QAM  methylPREDNISolone, 4 mg, Oral, 4x Daily Taper  [START ON 2023] methylPREDNISolone, 4 mg, Oral, TID Around Food  [START ON 2023] methylPREDNISolone, 4 mg, Oral, Before Breakfast  [START ON 2023] methylPREDNISolone, 4 mg, Oral, Tonight  [START ON 2023] methylPREDNISolone, 4 mg, Oral, Before Breakfast  metoprolol succinate XL, 75 mg, Oral, Nightly  pantoprazole, 40 mg, Oral, Q AM  polyethylene glycol, 17 g, Oral, Nightly  pravastatin, 10 mg, Oral, Nightly  rOPINIRole, 4 mg, Oral, Nightly  sodium chloride, 10 mL, Intravenous, Q12H  sodium chloride, 10 mL, Intravenous, Q12H      Continuous Infusions:     Vital signs in last 24 hours:  Temp:  [96.7 °F (35.9 °C)-97.5 °F (36.4 °C)] 97 °F (36.1 °C)  Heart Rate:  [63-83] 83  Resp:  [16] 16  BP: (135-204)/(68-96) 177/88    Intake/Output:    Intake/Output Summary (Last 24 hours) at 2023 1401  Last data filed at 2023 1309  Gross per 24 hour   Intake 1620 ml   Output --   Net 1620 ml       Exam:  /88 (BP Location: Left arm, Patient Position: Lying)   Pulse 83   Temp 97 °F (36.1 °C) (Oral)   Resp 16   Ht 157.5 cm (62\")   Wt 73 kg (160 lb 15 oz)   SpO2 98%   BMI 29.44 kg/m²     General Appearance:    Alert, cooperative, no distress   Head:    Normocephalic, without obvious abnormality, atraumatic   Eyes:       Throat:   Lips, tongue, gums normal   Neck:   " Supple, symmetrical, trachea midline, no JVD   Lungs:     Clear to auscultation bilaterally, respirations unlabored   Chest Wall:    No tenderness or deformity    Heart:    Regular rate and rhythm, S1 and S2 normal, no murmur,no  Rub or gallop   Abdomen:     Soft, nontender, bowel sounds active, no masses, no organomegaly    Extremities:   Extremities normal, atraumatic, no cyanosis or edema   Pulses:      Skin:   Skin is warm and dry,  no rashes or palpable lesions   Neurologic:   no focal deficits noted      Lab Results (last 72 hours)     Procedure Component Value Units Date/Time    POC Glucose Once [[id number]]  (Abnormal) Collected: 02/03/23 1117    Specimen: Blood Updated: 02/03/23 1119     Glucose 387 mg/dL      Comment: Meter: [id number] : [id number] Siddhartha TINOCO       POC Glucose Once [[id number]]  (Abnormal) Collected: 02/03/23 0627    Specimen: Blood Updated: 02/03/23 0629     Glucose 335 mg/dL      Comment: Meter: [id number] : [id number] Brandt TINOCO       CBC (No Diff) [[id number]]  (Abnormal) Collected: 02/03/23 0501    Specimen: Blood Updated: 02/03/23 0613     WBC 10.47 10*3/mm3      RBC 3.81 10*6/mm3      Hemoglobin 10.6 g/dL      Hematocrit 34.0 %      MCV 89.2 fL      MCH 27.8 pg      MCHC 31.2 g/dL      RDW 14.3 %      RDW-SD 46.1 fl      MPV 11.5 fL      Platelets 177 10*3/mm3     Basic Metabolic Panel [[id number]]  (Abnormal) Collected: 02/03/23 0501    Specimen: Blood Updated: 02/03/23 0553     Glucose 374 mg/dL      BUN 22 mg/dL      Creatinine 0.94 mg/dL      Sodium 136 mmol/L      Potassium 4.2 mmol/L      Chloride 101 mmol/L      CO2 23.7 mmol/L      Calcium 8.7 mg/dL      BUN/Creatinine Ratio 23.4     Anion Gap 11.3 mmol/L      eGFR 65.4 mL/min/1.73     Narrative:      GFR Normal >60  Chronic Kidney Disease <60  Kidney Failure <15      POC Glucose Once [[id number]]  (Abnormal) Collected: 02/02/23 2057    Specimen: Blood Updated: 02/02/23 2059     Glucose 284 mg/dL      Comment:  Meter: [id number] : [id number] Brandt TINOCO       POC Glucose Once [[id number]]  (Abnormal) Collected: 02/02/23 1621    Specimen: Blood Updated: 02/02/23 1622     Glucose 306 mg/dL      Comment: Meter: [id number] : [id number] Quoc Green EARNEST       Basic Metabolic Panel [[id number]]  (Abnormal) Collected: 02/02/23 1252    Specimen: Blood Updated: 02/02/23 1329     Glucose 315 mg/dL      BUN 17 mg/dL      Creatinine 0.96 mg/dL      Sodium 137 mmol/L      Potassium 4.2 mmol/L      Chloride 100 mmol/L      CO2 24.2 mmol/L      Calcium 8.9 mg/dL      BUN/Creatinine Ratio 17.7     Anion Gap 12.8 mmol/L      eGFR 63.8 mL/min/1.73     Narrative:      GFR Normal >60  Chronic Kidney Disease <60  Kidney Failure <15      Hemoglobin A1c [[id number]]  (Abnormal) Collected: 02/02/23 1252    Specimen: Blood Updated: 02/02/23 1324     Hemoglobin A1C 7.40 %     Narrative:      Hemoglobin A1C Ranges:    Increased Risk for Diabetes  5.7% to 6.4%  Diabetes                     >= 6.5%  Diabetic Goal                < 7.0%    CBC (No Diff) [[id number]]  (Abnormal) Collected: 02/02/23 1252    Specimen: Blood Updated: 02/02/23 1311     WBC 6.17 10*3/mm3      RBC 4.30 10*6/mm3      Hemoglobin 11.7 g/dL      Hematocrit 37.2 %      MCV 86.5 fL      MCH 27.2 pg      MCHC 31.5 g/dL      RDW 13.9 %      RDW-SD 44.0 fl      MPV 11.2 fL      Platelets 217 10*3/mm3     POC Glucose Once [[id number]]  (Abnormal) Collected: 02/02/23 1119    Specimen: Blood Updated: 02/02/23 1122     Glucose 226 mg/dL      Comment: Meter: [id number] : [id number] Quoc Green EARNEST       POC Glucose Once [[id number]]  (Abnormal) Collected: 02/02/23 0911    Specimen: Blood Updated: 02/02/23 1010     Glucose 252 mg/dL      Comment: Meter: [id number] : [id number] Mohsen CHARLES RN       POC Glucose Once [[id number]]  (Abnormal) Collected: 02/02/23 0600    Specimen: Blood Updated: 02/02/23 0602     Glucose 296 mg/dL      Comment: Meter: [id number] : [id number]  Peter Zoie NA       POC Glucose Once [[id number]]  (Normal) Collected: 02/01/23 1602    Specimen: Blood Updated: 02/01/23 1604     Glucose 121 mg/dL      Comment: Meter: [id number] : [id number] Babak Jessicaemilia TINOCO       POC Glucose Once [[id number]]  (Abnormal) Collected: 02/01/23 1107    Specimen: Blood Updated: 02/01/23 1109     Glucose 163 mg/dL      Comment: Meter: [id number] : [id number] Babak Jessicaa NA       POC Glucose Once [[id number]]  (Abnormal) Collected: 02/01/23 0547    Specimen: Blood Updated: 02/01/23 0548     Glucose 169 mg/dL      Comment: Meter: [id number] : [id number] Peter Zoie NA       POC Glucose Once [[id number]]  (Normal) Collected: 01/31/23 1700    Specimen: Blood Updated: 01/31/23 1701     Glucose 124 mg/dL      Comment: Meter: [id number] : [id number] Babak TINOCO           Data Review:  Results from last 7 days   Lab Units 02/06/23  0605 02/04/23  0656 02/03/23  0501   SODIUM mmol/L 135* 137 136   POTASSIUM mmol/L 4.9 4.0 4.2   CHLORIDE mmol/L 98 101 101   CO2 mmol/L 28.9 24.3 23.7   BUN mg/dL 24* 21 22   CREATININE mg/dL 1.04* 0.85 0.94   GLUCOSE mg/dL 385* 397* 374*   CALCIUM mg/dL 8.7 9.0 8.7     Results from last 7 days   Lab Units 02/06/23  0605 02/04/23  0656 02/03/23  0501   WBC 10*3/mm3 8.36 12.65* 10.47   HEMOGLOBIN g/dL 10.8* 11.0* 10.6*   HEMATOCRIT % 35.4 35.5 34.0   PLATELETS 10*3/mm3 176 177 177         Results from last 7 days   Lab Units 02/02/23  1252   HEMOGLOBIN A1C % 7.40*     No results found for: TROPONINT            Invalid input(s): PROT, LABALBU      Results from last 7 days   Lab Units 02/02/23  1252   HEMOGLOBIN A1C % 7.40*     Glucose   Date/Time Value Ref Range Status   02/06/2023 1046 229 (H) 70 - 130 mg/dL Final     Comment:     Meter: [id number] : [id number] Miguelangel Sosa CNA   02/06/2023 0616 352 (H) 70 - 130 mg/dL Final     Comment:     Meter: [id number] : [id number] Peter TINOCO   02/05/2023 1615  146 (H) 70 - 130 mg/dL Final     Comment:     Meter: [id number] : [id number] Estela Morales PCA   02/05/2023 1129 278 (H) 70 - 130 mg/dL Final     Comment:     Meter: [id number] : [id number] Salbador López NA   02/05/2023 0628 321 (H) 70 - 130 mg/dL Final     Comment:     Meter: [id number] : [id number] Peter Lino NA   02/04/2023 1651 287 (H) 70 - 130 mg/dL Final     Comment:     Meter: [id number] : [id number] Yasmany Peraza RN   02/04/2023 1149 129 70 - 130 mg/dL Final     Comment:     Meter: [id number] : [id number] Yasmany Peraza RN   02/04/2023 0621 405 (C) 70 - 130 mg/dL Final     Comment:     Confirmed by Repeat RN Notified R and V Meter: [id number] : [id number] Michela           Past Medical History:   Diagnosis Date   • Arm pain     RIGHT   • Chronic low back pain    • DDD (degenerative disc disease), lumbar    • Diabetes mellitus (HCC)     TYPE 2   • Dysrhythmia    • GERD (gastroesophageal reflux disease)    • Herpes     GETS FEVER BLISTERS AT TIMES   • History of echocardiogram 11/13/2019    EF 68% Grade 1 diastolic dysfucntion. Mild AR. Trace TR.    • History of hepatitis A     1970   • History of transfusion    • Humerus shaft fracture     RIGHT   • Hyperlipidemia    • Hypertension    • IBS (irritable bowel syndrome)    • Lumbar canal stenosis    • Lumbar radiculopathy    • PONV (postoperative nausea and vomiting)    • Psoriasis    • Psoriatic arthritis (HCC)    • Rheumatoid arthritis (HCC)    • RLS (restless legs syndrome)    • Sinus tachycardia        Assessment:  Active Hospital Problems    Diagnosis  POA   • **Back pain [M54.9]  Yes   • Low back pain [M54.50]  Yes   • Diabetes mellitus (HCC) [E11.9]  Yes   • Hypertension [I10]  Yes   • Rheumatoid arthritis (HCC) [M06.9]  Yes   • Spinal stenosis, lumbar region, with neurogenic claudication [M48.062]  Yes   • Lumbar radiculitis [M54.16]  Unknown      Resolved Hospital Problems   No resolved problems to display.        Plan:  Continue neurontin to 600 mg tid which is her home dose. DC planning. Probably home tomorrow and follow up with pain management for Epidural steroid injection.  Back on oral DM meds and DC solucortef and go with medrol dose kavon. May need insulin at home.  Increase Lantus and Norvasc.    Navin Mai MD  2/6/2023  14:01 EST

## 2023-02-06 NOTE — NURSING NOTE
BP elevated this AM, 204/91. Lisinopril given. Now trending down. Discussed with Dr. Azul. BP currently 162/87.

## 2023-02-07 ENCOUNTER — READMISSION MANAGEMENT (OUTPATIENT)
Dept: CALL CENTER | Facility: HOSPITAL | Age: 68
End: 2023-02-07
Payer: MEDICARE

## 2023-02-07 VITALS
SYSTOLIC BLOOD PRESSURE: 164 MMHG | RESPIRATION RATE: 16 BRPM | HEART RATE: 72 BPM | OXYGEN SATURATION: 97 % | DIASTOLIC BLOOD PRESSURE: 86 MMHG | TEMPERATURE: 96.7 F | WEIGHT: 162.92 LBS | BODY MASS INDEX: 29.98 KG/M2 | HEIGHT: 62 IN

## 2023-02-07 LAB
ANION GAP SERPL CALCULATED.3IONS-SCNC: 8 MMOL/L (ref 5–15)
BASOPHILS # BLD AUTO: 0.01 10*3/MM3 (ref 0–0.2)
BASOPHILS NFR BLD AUTO: 0.1 % (ref 0–1.5)
BUN SERPL-MCNC: 23 MG/DL (ref 8–23)
BUN/CREAT SERPL: 25.8 (ref 7–25)
CALCIUM SPEC-SCNC: 8.6 MG/DL (ref 8.6–10.5)
CHLORIDE SERPL-SCNC: 98 MMOL/L (ref 98–107)
CO2 SERPL-SCNC: 28 MMOL/L (ref 22–29)
CREAT SERPL-MCNC: 0.89 MG/DL (ref 0.57–1)
DEPRECATED RDW RBC AUTO: 47 FL (ref 37–54)
EGFRCR SERPLBLD CKD-EPI 2021: 69.8 ML/MIN/1.73
EOSINOPHIL # BLD AUTO: 0.01 10*3/MM3 (ref 0–0.4)
EOSINOPHIL NFR BLD AUTO: 0.1 % (ref 0.3–6.2)
ERYTHROCYTE [DISTWIDTH] IN BLOOD BY AUTOMATED COUNT: 14.6 % (ref 12.3–15.4)
GLUCOSE BLDC GLUCOMTR-MCNC: 190 MG/DL (ref 70–130)
GLUCOSE BLDC GLUCOMTR-MCNC: 259 MG/DL (ref 70–130)
GLUCOSE BLDC GLUCOMTR-MCNC: 362 MG/DL (ref 70–130)
GLUCOSE SERPL-MCNC: 330 MG/DL (ref 65–99)
HCT VFR BLD AUTO: 35.7 % (ref 34–46.6)
HGB BLD-MCNC: 11.4 G/DL (ref 12–15.9)
IMM GRANULOCYTES # BLD AUTO: 0.11 10*3/MM3 (ref 0–0.05)
IMM GRANULOCYTES NFR BLD AUTO: 1.3 % (ref 0–0.5)
LYMPHOCYTES # BLD AUTO: 0.92 10*3/MM3 (ref 0.7–3.1)
LYMPHOCYTES NFR BLD AUTO: 10.7 % (ref 19.6–45.3)
MCH RBC QN AUTO: 28.2 PG (ref 26.6–33)
MCHC RBC AUTO-ENTMCNC: 31.9 G/DL (ref 31.5–35.7)
MCV RBC AUTO: 88.4 FL (ref 79–97)
MONOCYTES # BLD AUTO: 0.64 10*3/MM3 (ref 0.1–0.9)
MONOCYTES NFR BLD AUTO: 7.5 % (ref 5–12)
NEUTROPHILS NFR BLD AUTO: 6.88 10*3/MM3 (ref 1.7–7)
NEUTROPHILS NFR BLD AUTO: 80.3 % (ref 42.7–76)
NRBC BLD AUTO-RTO: 0 /100 WBC (ref 0–0.2)
PLATELET # BLD AUTO: 197 10*3/MM3 (ref 140–450)
PMV BLD AUTO: 11.7 FL (ref 6–12)
POTASSIUM SERPL-SCNC: 4.7 MMOL/L (ref 3.5–5.2)
RBC # BLD AUTO: 4.04 10*6/MM3 (ref 3.77–5.28)
SODIUM SERPL-SCNC: 134 MMOL/L (ref 136–145)
WBC NRBC COR # BLD: 8.57 10*3/MM3 (ref 3.4–10.8)

## 2023-02-07 PROCEDURE — 82962 GLUCOSE BLOOD TEST: CPT

## 2023-02-07 PROCEDURE — 63710000001 METHYLPREDNISOLONE 4 MG TABLET THERAPY PACK 21 EACH DISP PACK: Performed by: HOSPITALIST

## 2023-02-07 PROCEDURE — 63710000001 INSULIN LISPRO (HUMAN) PER 5 UNITS: Performed by: HOSPITALIST

## 2023-02-07 PROCEDURE — 85025 COMPLETE CBC W/AUTO DIFF WBC: CPT | Performed by: HOSPITALIST

## 2023-02-07 PROCEDURE — 80048 BASIC METABOLIC PNL TOTAL CA: CPT | Performed by: HOSPITALIST

## 2023-02-07 RX ORDER — AMLODIPINE BESYLATE 10 MG/1
10 TABLET ORAL
Qty: 30 TABLET | Refills: 0 | Status: SHIPPED | OUTPATIENT
Start: 2023-02-08 | End: 2023-03-10

## 2023-02-07 RX ORDER — METHYLPREDNISOLONE 4 MG/1
TABLET ORAL
Qty: 21 TABLET | Refills: 0 | COMMUNITY
Start: 2023-02-08

## 2023-02-07 RX ORDER — HYDROCHLOROTHIAZIDE 12.5 MG/1
12.5 CAPSULE, GELATIN COATED ORAL EVERY MORNING
Qty: 30 CAPSULE | Refills: 5
Start: 2023-02-07

## 2023-02-07 RX ORDER — OXYCODONE AND ACETAMINOPHEN 7.5; 325 MG/1; MG/1
1 TABLET ORAL EVERY 4 HOURS PRN
Qty: 30 TABLET | Refills: 0 | Status: SHIPPED | OUTPATIENT
Start: 2023-02-07 | End: 2023-02-13

## 2023-02-07 RX ORDER — INSULIN LISPRO 100 [IU]/ML
0-9 INJECTION, SOLUTION INTRAVENOUS; SUBCUTANEOUS
Qty: 15 ML | Refills: 0 | Status: SHIPPED | OUTPATIENT
Start: 2023-02-07 | End: 2023-02-07

## 2023-02-07 RX ORDER — METHYLPREDNISOLONE 4 MG/1
TABLET ORAL
Qty: 21 TABLET | Refills: 0 | COMMUNITY
Start: 2023-02-07

## 2023-02-07 RX ORDER — METHYLPREDNISOLONE 4 MG/1
TABLET ORAL
Qty: 21 TABLET | Refills: 0 | COMMUNITY
Start: 2023-02-09

## 2023-02-07 RX ADMIN — GABAPENTIN 600 MG: 300 CAPSULE ORAL at 17:15

## 2023-02-07 RX ADMIN — PANTOPRAZOLE SODIUM 40 MG: 40 TABLET, DELAYED RELEASE ORAL at 06:13

## 2023-02-07 RX ADMIN — INSULIN LISPRO 6 UNITS: 100 INJECTION, SOLUTION INTRAVENOUS; SUBCUTANEOUS at 12:53

## 2023-02-07 RX ADMIN — METHYLPREDNISOLONE 4 MG: 4 TABLET ORAL at 17:16

## 2023-02-07 RX ADMIN — Medication 10 ML: at 08:36

## 2023-02-07 RX ADMIN — OXYCODONE HYDROCHLORIDE AND ACETAMINOPHEN 1 TABLET: 7.5; 325 TABLET ORAL at 17:15

## 2023-02-07 RX ADMIN — INSULIN LISPRO 8 UNITS: 100 INJECTION, SOLUTION INTRAVENOUS; SUBCUTANEOUS at 08:39

## 2023-02-07 RX ADMIN — METHYLPREDNISOLONE 4 MG: 4 TABLET ORAL at 12:53

## 2023-02-07 RX ADMIN — GLIPIZIDE 5 MG: 5 TABLET ORAL at 06:41

## 2023-02-07 RX ADMIN — LISINOPRIL 5 MG: 5 TABLET ORAL at 06:13

## 2023-02-07 RX ADMIN — INSULIN LISPRO 2 UNITS: 100 INJECTION, SOLUTION INTRAVENOUS; SUBCUTANEOUS at 17:15

## 2023-02-07 RX ADMIN — OXYCODONE HYDROCHLORIDE AND ACETAMINOPHEN 1 TABLET: 7.5; 325 TABLET ORAL at 01:06

## 2023-02-07 RX ADMIN — LINAGLIPTIN 5 MG: 5 TABLET, FILM COATED ORAL at 08:35

## 2023-02-07 RX ADMIN — METHYLPREDNISOLONE 4 MG: 4 TABLET ORAL at 06:41

## 2023-02-07 RX ADMIN — GABAPENTIN 600 MG: 300 CAPSULE ORAL at 08:39

## 2023-02-07 RX ADMIN — AMLODIPINE BESYLATE 10 MG: 10 TABLET ORAL at 08:35

## 2023-02-07 RX ADMIN — ACETAMINOPHEN 650 MG: 325 TABLET, FILM COATED ORAL at 10:02

## 2023-02-07 RX ADMIN — OXYCODONE HYDROCHLORIDE AND ACETAMINOPHEN 1 TABLET: 7.5; 325 TABLET ORAL at 06:13

## 2023-02-07 NOTE — PROGRESS NOTES
Discharge Planning Assessment  [facility]     Patient Name: Kathy Ramirez  MRN: [id number]  Today's Date: 2/7/2023    Admit Date: 1/30/2023    Plan: Home with home health vs. rehab pending PT/OT evals   Discharge Needs Assessment    No documentation.                Discharge Plan     Row Name 02/07/23 1543       Plan    Plan Comments Called Olamide at [facility] and left her a voice mail about the patient being discharged. PRIMO Baltazar RN CCP.    Row Name 02/07/23 1540       Plan    Plan Comments Called [facility] and she will get the hospital bed delivered. Will call [facility] and let them know that she is being discharged to home. Family to transport. PRIMO baltazar RN CCP.              Continued Care and Services - Admitted Since 1/30/2023     Destination     Service Provider Request Status Selected Services Address Phone Fax Patient Preferred    [facility] Considering N/A [address] [facility] [address] [phone] --          Durable Medical Equipment     Service Provider Request Status Selected Services Address Phone Fax Patient Preferred    [facility]  Selected Durable Medical Equipment [address] [facility] [id number] [id number] [phone] --          Home Medical Care     Service Provider Request Status Selected Services Address Phone Fax Patient Preferred    [facility] [address] [place] [address] [phone] --       Internal Comment last updated by Chantale Baltazar RN 2/7/2023 1543    Called Olamide at [facility] and left her a voice mail about the patient being discharged. PRIMO Baltazar RN CCP.                [facility] Accepted N/A [address] [address] [phone] --              Expected Discharge Date and Time     Expected Discharge Date Expected  Discharge Time    Feb 7, 2023          Demographic Summary    No documentation.                Functional Status    No documentation.                Psychosocial    No documentation.                Abuse/Neglect    No documentation.                Legal    No documentation.                Substance Abuse    No documentation.                Patient Forms    No documentation.                   Chantale Shirley RN

## 2023-02-07 NOTE — TELEPHONE ENCOUNTER
DILIP for patient informing that I am faxing a new Referral to [facility] yisel Mejia

## 2023-02-07 NOTE — PLAN OF CARE
Goal Outcome Evaluation:  Plan of Care Reviewed With: patient        Progress: improving     Pt AxOx4, calm and cooperative. Takes pills whole with water, see MAR. Sitting up in the recliner most of the day. PO steroids given. PRN Percocet given x1 for pain control. Slading scale insulin given. BP elevated throughout the day, Dr. Mai notified. PRN IV Hydralazine given. Family visited later this afternoon. Pt is a stand by assist, with a walker, to the bathroom. RN will continue to monitor.

## 2023-02-07 NOTE — DISCHARGE SUMMARY
PHYSICIAN DISCHARGE SUMMARY                                                                        [facility]    Patient Identification:  Name: Kathy Ramirez  Age: 68 y.o.  Sex: female  :  1955  MRN: [id number]  Primary Care Physician: Lucy Turcios APRN    Admit date: 2023  Discharge date and time:2023  Discharged Condition: good    Discharge Diagnoses:  Active Hospital Problems    Diagnosis  POA   • **Back pain [M54.9]  Yes   • Low back pain [M54.50]  Yes   • Diabetes mellitus (HCC) [E11.9]  Yes   • Hypertension [I10]  Yes   • Rheumatoid arthritis (HCC) [M06.9]  Yes   • Spinal stenosis, lumbar region, with neurogenic claudication [M48.062]  Yes   • Lumbar radiculitis [M54.16]  Unknown      Resolved Hospital Problems   No resolved problems to display.          PMHX:   Past Medical History:   Diagnosis Date   • Arm pain     RIGHT   • Chronic low back pain    • DDD (degenerative disc disease), lumbar    • Diabetes mellitus (HCC)     TYPE 2   • Dysrhythmia    • GERD (gastroesophageal reflux disease)    • Herpes     GETS FEVER BLISTERS AT TIMES   • History of echocardiogram 2019    EF 68% Grade 1 diastolic dysfucntion. Mild AR. Trace TR.    • History of hepatitis A     1970   • History of transfusion    • Humerus shaft fracture     RIGHT   • Hyperlipidemia    • Hypertension    • IBS (irritable bowel syndrome)    • Lumbar canal stenosis    • Lumbar radiculopathy    • PONV (postoperative nausea and vomiting)    • Psoriasis    • Psoriatic arthritis (HCC)    • Rheumatoid arthritis (HCC)    • RLS (restless legs syndrome)    • Sinus tachycardia      PSHX:   Past Surgical History:   Procedure Laterality Date   • ANTERIOR CERVICAL DISCECTOMY W/ FUSION N/A 2018    Procedure: C5 6 anterior cervical discectomy, fusion and instrumentation;  Surgeon: Robbie Rahman MD;  Location: [facility];  Service: Neurosurgery   •  APPENDECTOMY  1972   •  SECTION      5 csection   • CHOLECYSTECTOMY     • COLONOSCOPY     • HYSTERECTOMY     • KNEE ARTHROPLASTY Left    • KNEE ARTHROPLASTY Right    • ORIF HUMERUS FRACTURE Right 2020    Procedure: REPAIR NONUNION RIGHT HUMERUS SHAFT FRACTURE, EXPLORATION RADIAL NERVE;  Surgeon: Betito Delaney MD;  Location: [facility];  Service: Orthopedics;  Laterality: Right;   • TOTAL SHOULDER ARTHROPLASTY W/ DISTAL CLAVICLE EXCISION Right 3/10/2020    Procedure: RIGHT TOTAL SHOULDER REVERSE ARTHROPLASTY, ORIF OF PERIPROSTHETIC FRACTURE;  Surgeon: Festus Siu MD;  Location: [facility];  Service: Orthopedics;  Laterality: Right;   • WISDOM TOOTH EXTRACTION         Hospital Course: Kathy Ramirez is a 68 y.o. female with a history of chronic back pain followed by Dr. Rahman last seen July and was referred to Dr. Paul but it looks like that was delayed as she had a left periprosthetic femur fracture that had revision. She came in for low back pain that is worse the last couple of days with radiation down into her left leg. She also reports left leg numbness. Pain is worse with movement. She normally uses a walker but has had to use wheelchair for the last couple of days. Denies any bowel or bladder incontinence. She also reports some muscle spasms in her left leg. She received pain medication in the ED that helped as long as she is not moving.  The patient was admitted to the hospital and seen by neurosurgery.  They again did not recommend surgery and the patient did have epidural steroid injection which seemed to help some plus was on high dose of IV steroids in the hospital.  The steroids made her sugars and blood pressure go up and we had to make some adjustments on her medicine.  She was feeling better after a few days and looked well enough to go home.  She is having to take higher dose of Percocet and will follow-up with her pain management doctor.   She will also follow-up with her primary care in 1 week for ongoing care.  We put her on some insulin since her sugars have been up to 3 and 400 but probably only will need this short-term.  She has Chemstrips and monitor we will monitor sugars closely at home and hopefully just need short-term insulin.  Also added some metformin.  Neurosurgery recommended that she get another epidural steroid injection with her pain management doctor as outpatient.  Hospital bed is needed due to patient requiring positioning of the body in ways not feasible with an ordinary bed in order to alleviate her pain.  Arrangements will be made for her to have a hospital bed at home.       Consults:     Consults     Date and Time Order Name Status Description    1/30/2023  9:37 PM Inpatient Neurosurgery Consult Completed     1/30/2023  8:56 PM [facility] (on-call MD unless specified) Details          Results from last 7 days   Lab Units 02/07/23  0548   WBC 10*3/mm3 8.57   HEMOGLOBIN g/dL 11.4*   HEMATOCRIT % 35.7   PLATELETS 10*3/mm3 197     Results from last 7 days   Lab Units 02/07/23  0548   SODIUM mmol/L 134*   POTASSIUM mmol/L 4.7   CHLORIDE mmol/L 98   CO2 mmol/L 28.0   BUN mg/dL 23   CREATININE mg/dL 0.89   GLUCOSE mg/dL 330*   CALCIUM mg/dL 8.6     Significant Diagnostic Studies:   WBC   Date Value Ref Range Status   02/07/2023 8.57 3.40 - 10.80 10*3/mm3 Final     Hemoglobin   Date Value Ref Range Status   02/07/2023 11.4 (L) 12.0 - 15.9 g/dL Final     Hematocrit   Date Value Ref Range Status   02/07/2023 35.7 34.0 - 46.6 % Final     Platelets   Date Value Ref Range Status   02/07/2023 197 140 - 450 10*3/mm3 Final     Sodium   Date Value Ref Range Status   02/07/2023 134 (L) 136 - 145 mmol/L Final     Potassium   Date Value Ref Range Status   02/07/2023 4.7 3.5 - 5.2 mmol/L Final     Chloride   Date Value Ref Range Status   02/07/2023 98 98 - 107 mmol/L Final     CO2   Date Value Ref Range Status   02/07/2023 28.0 22.0 - 29.0 mmol/L Final      BUN   Date Value Ref Range Status   02/07/2023 23 8 - 23 mg/dL Final     Creatinine   Date Value Ref Range Status   02/07/2023 0.89 0.57 - 1.00 mg/dL Final     Glucose   Date Value Ref Range Status   02/07/2023 330 (H) 65 - 99 mg/dL Final     Calcium   Date Value Ref Range Status   02/07/2023 8.6 8.6 - 10.5 mg/dL Final     No results found for: AST, ALT, ALKPHOS  No results found for: APTT, INR  No results found for: COLORU, CLARITYU, SPECGRAV, PHUR, PROTEINUR, GLUCOSEU, KETONESU, BLOODU, NITRITE, LEUKOCYTESUR, BILIRUBINUR, UROBILINOGEN, RBCUA, WBCUA, BACTERIA, UACOMMENT  No results found for: TROPONINT, TROPONINI, BNP  No components found for: HGBA1C;2  No components found for: TSH;2  Imaging Results (All)     Procedure Component Value Units Date/Time    FL Guided Pain Management Spine [[id number]] Resulted: 02/02/23 0925     Updated: 02/02/23 0925    Narrative:      This procedure was auto-finalized with no dictation required.    MRI Lumbar Spine Without Contrast [[id number]] Collected: 02/01/23 0253     Updated: 02/01/23 0253    Narrative:        Patient: FARIBA MCCORD  Time Out: 02:52  Exam(s): MRI L SPINE Without Contrast     EXAM:    MR Lumbar Spine Without Intravenous Contrast    CLINICAL HISTORY:     Reason for exam: Severe right leg pain; evaluate stenosis.    TECHNIQUE:    Magnetic resonance images of the lumbar spine without intravenous   contrast in multiple planes.    COMPARISON:      6.30.22    FINDINGS:    Vertebrae:  No acute fracture. Opposing end plate edema L2-3 and L4-5,   likely discogenic.    Interspaces:  Severe disc height loss L3-4 and L4-5.    Spinal cord:  Unremarkable.  Normal signal.    Soft tissues:  Unremarkable.     DISCS SPINAL CANAL NEURAL FORAMINA:    L1-2: 4 mm right paracentral disc protrusion results in effacement of the   right lateral recess, likely impinging the right traversing L2 nerve   roots. Severe right and moderate left foraminal stenosis.    L2-3: Mild to moderate  spinal canal stenosis from degenerative changes.   Mild left foraminal stenosis.     L3-4: Left paracentral superiorly directed 1.2 cm disc extrusion likely   impinging the left traversing L3 nerve root.  Severe left foraminal   stenosis.    L4-5: Severe spinal canal stenosis from multifactorial disc disease.   Severe right and moderate left foraminal stenosis.    L5-S1: 3 mm central right paracentral disc protrusion without significant   canal stenosis. Moderate right foraminal stenosis.    IMPRESSION:           Severe disc disease, with severe central stenosis at L4-5 and severe   right foraminal stenosis at L1-2. Other findings as above.  Correlate   with cauda equina syndrome.        Impression:          Electronically signed by Ian Colon MD on 02-01-23 at 0252    XR Spine Lumbar Complete 4+VW [[id number]] Collected: 01/30/23 1909     Updated: 01/30/23 2018    Narrative:      LUMBAR SPINE COMPLETE     HISTORY: Back pain.     COMPARISON: Plain film examination of the lumbar spine 06/30/2022.     FINDINGS: The study is hampered by patient positioning. There is  moderate dextroscoliosis of the lumbar spine. There is severe loss of  disc height laterally to the left from L2 to L4 and moderate-to-severe  loss of disc height at L5-S1. Moderate-to-severe facet degenerative  disease is noted at L4-L5 and L5-S1. Vacuum disc effect is appreciated  from L1 to L5. There is no evidence of fracture. Further evaluation  could be performed with MRI examination of the lumbar spine as  indicated.     This report was finalized on 1/30/2023 8:15 PM by Dr. John Pollard M.D.           Lab Results (last 7 days)     Procedure Component Value Units Date/Time    POC Glucose Once [[id number]]  (Abnormal) Collected: 02/07/23 1054    Specimen: Blood Updated: 02/07/23 1056     Glucose 259 mg/dL      Comment: Meter: [id number] : [id number] Miguelangel Sosa CNA       Basic Metabolic Panel [[id number]]  (Abnormal) Collected: 02/07/23  0548    Specimen: Blood Updated: 02/07/23 0639     Glucose 330 mg/dL      BUN 23 mg/dL      Creatinine 0.89 mg/dL      Sodium 134 mmol/L      Potassium 4.7 mmol/L      Chloride 98 mmol/L      CO2 28.0 mmol/L      Calcium 8.6 mg/dL      BUN/Creatinine Ratio 25.8     Anion Gap 8.0 mmol/L      eGFR 69.8 mL/min/1.73     Narrative:      GFR Normal >60  Chronic Kidney Disease <60  Kidney Failure <15      CBC & Differential [[id number]]  (Abnormal) Collected: 02/07/23 0548    Specimen: Blood Updated: 02/07/23 0627    Narrative:      The following orders were created for panel order CBC & Differential.  Procedure                               Abnormality         Status                     ---------                               -----------         ------                     CBC Auto Differential[[id number]]        Abnormal            Final result                 Please view results for these tests on the individual orders.    CBC Auto Differential [[id number]]  (Abnormal) Collected: 02/07/23 0548    Specimen: Blood Updated: 02/07/23 0627     WBC 8.57 10*3/mm3      RBC 4.04 10*6/mm3      Hemoglobin 11.4 g/dL      Hematocrit 35.7 %      MCV 88.4 fL      MCH 28.2 pg      MCHC 31.9 g/dL      RDW 14.6 %      RDW-SD 47.0 fl      MPV 11.7 fL      Platelets 197 10*3/mm3      Neutrophil % 80.3 %      Lymphocyte % 10.7 %      Monocyte % 7.5 %      Eosinophil % 0.1 %      Basophil % 0.1 %      Immature Grans % 1.3 %      Neutrophils, Absolute 6.88 10*3/mm3      Lymphocytes, Absolute 0.92 10*3/mm3      Monocytes, Absolute 0.64 10*3/mm3      Eosinophils, Absolute 0.01 10*3/mm3      Basophils, Absolute 0.01 10*3/mm3      Immature Grans, Absolute 0.11 10*3/mm3      nRBC 0.0 /100 WBC     POC Glucose Once [[id number]]  (Abnormal) Collected: 02/07/23 0554    Specimen: Blood Updated: 02/07/23 0556     Glucose 362 mg/dL      Comment: Meter: [id number] : [id number] Peter TINOCO       POC Glucose Once [[id number]]  (Abnormal) Collected:  02/06/23 1524    Specimen: Blood Updated: 02/06/23 1525     Glucose 259 mg/dL      Comment: Meter: [id number] : [id number] Means Ian  FLAVIAA       POC Glucose Once [[id number]]  (Abnormal) Collected: 02/06/23 1046    Specimen: Blood Updated: 02/06/23 1048     Glucose 229 mg/dL      Comment: Meter: [id number] : [id number] Means John  FLAVIAA       Basic Metabolic Panel [[id number]]  (Abnormal) Collected: 02/06/23 0605    Specimen: Blood Updated: 02/06/23 0809     Glucose 385 mg/dL      BUN 24 mg/dL      Creatinine 1.04 mg/dL      Sodium 135 mmol/L      Potassium 4.9 mmol/L      Chloride 98 mmol/L      CO2 28.9 mmol/L      Calcium 8.7 mg/dL      BUN/Creatinine Ratio 23.1     Anion Gap 8.1 mmol/L      eGFR 57.9 mL/min/1.73     Narrative:      GFR Normal >60  Chronic Kidney Disease <60  Kidney Failure <15      CBC & Differential [[id number]]  (Abnormal) Collected: 02/06/23 0605    Specimen: Blood Updated: 02/06/23 0732    Narrative:      The following orders were created for panel order CBC & Differential.  Procedure                               Abnormality         Status                     ---------                               -----------         ------                     CBC Auto Differential[[id number]]        Abnormal            Final result                 Please view results for these tests on the individual orders.    CBC Auto Differential [[id number]]  (Abnormal) Collected: 02/06/23 0605    Specimen: Blood Updated: 02/06/23 0732     WBC 8.36 10*3/mm3      RBC 3.97 10*6/mm3      Hemoglobin 10.8 g/dL      Hematocrit 35.4 %      MCV 89.2 fL      MCH 27.2 pg      MCHC 30.5 g/dL      RDW 14.4 %      RDW-SD 46.0 fl      MPV 12.2 fL      Platelets 176 10*3/mm3      Neutrophil % 80.7 %      Lymphocyte % 10.2 %      Monocyte % 8.3 %      Eosinophil % 0.0 %      Basophil % 0.1 %      Immature Grans % 0.7 %      Neutrophils, Absolute 6.75 10*3/mm3      Lymphocytes, Absolute 0.85 10*3/mm3      Monocytes, Absolute  0.69 10*3/mm3      Eosinophils, Absolute 0.00 10*3/mm3      Basophils, Absolute 0.01 10*3/mm3      Immature Grans, Absolute 0.06 10*3/mm3      nRBC 0.0 /100 WBC     POC Glucose Once [[id number]]  (Abnormal) Collected: 02/06/23 0616    Specimen: Blood Updated: 02/06/23 0618     Glucose 352 mg/dL      Comment: Meter: [id number] : [id number] Green Zoie NA       POC Glucose Once [[id number]]  (Abnormal) Collected: 02/05/23 1615    Specimen: Blood Updated: 02/05/23 1617     Glucose 146 mg/dL      Comment: Meter: [id number] : [id number] Estela HELLER       POC Glucose Once [[id number]]  (Abnormal) Collected: 02/05/23 1129    Specimen: Blood Updated: 02/05/23 1132     Glucose 278 mg/dL      Comment: Meter: [id number] : [id number] Salbador TINOCO       POC Glucose Once [[id number]]  (Abnormal) Collected: 02/05/23 0628    Specimen: Blood Updated: 02/05/23 0630     Glucose 321 mg/dL      Comment: Meter: [id number] : [id number] [employer] NA       POC Glucose Once [[id number]]  (Abnormal) Collected: 02/04/23 1651    Specimen: Blood Updated: 02/04/23 1653     Glucose 287 mg/dL      Comment: Meter: [id number] : [id number] Yasmany Peraza RN       POC Glucose Once [[id number]]  (Normal) Collected: 02/04/23 1149    Specimen: Blood Updated: 02/04/23 1151     Glucose 129 mg/dL      Comment: Meter: [id number] : [id number] Yasmany Peraza RN       Basic Metabolic Panel [[id number]]  (Abnormal) Collected: 02/04/23 0656    Specimen: Blood Updated: 02/04/23 0743     Glucose 397 mg/dL      BUN 21 mg/dL      Creatinine 0.85 mg/dL      Sodium 137 mmol/L      Potassium 4.0 mmol/L      Chloride 101 mmol/L      CO2 24.3 mmol/L      Calcium 9.0 mg/dL      BUN/Creatinine Ratio 24.7     Anion Gap 11.7 mmol/L      eGFR 73.8 mL/min/1.73     Narrative:      GFR Normal >60  Chronic Kidney Disease <60  Kidney Failure <15      CBC (No Diff) [[id number]]  (Abnormal) Collected: 02/04/23 0656    Specimen: Blood Updated:  02/04/23 0726     WBC 12.65 10*3/mm3      RBC 3.93 10*6/mm3      Hemoglobin 11.0 g/dL      Hematocrit 35.5 %      MCV 90.3 fL      MCH 28.0 pg      MCHC 31.0 g/dL      RDW 14.6 %      RDW-SD 47.5 fl      MPV 11.7 fL      Platelets 177 10*3/mm3     POC Glucose Once [[id number]]  (Abnormal) Collected: 02/04/23 0621    Specimen: Blood Updated: 02/04/23 0626     Glucose 405 mg/dL      Comment: Confirmed by Repeat RN Notified R and V Meter: [id number] : [id number] Michela       POC Glucose Once [[id number]]  (Abnormal) Collected: 02/03/23 1659    Specimen: Blood Updated: 02/03/23 1701     Glucose 326 mg/dL      Comment: Meter: [id number] : [id number] [employer] Priscilla NA       POC Glucose Once [[id number]]  (Abnormal) Collected: 02/03/23 1117    Specimen: Blood Updated: 02/03/23 1119     Glucose 387 mg/dL      Comment: Meter: [id number] : [id number] [employer] Priscilla NA       POC Glucose Once [[id number]]  (Abnormal) Collected: 02/03/23 0627    Specimen: Blood Updated: 02/03/23 0629     Glucose 335 mg/dL      Comment: Meter: [id number] : [id number] Brandt TINOCO       CBC (No Diff) [[id number]]  (Abnormal) Collected: 02/03/23 0501    Specimen: Blood Updated: 02/03/23 0613     WBC 10.47 10*3/mm3      RBC 3.81 10*6/mm3      Hemoglobin 10.6 g/dL      Hematocrit 34.0 %      MCV 89.2 fL      MCH 27.8 pg      MCHC 31.2 g/dL      RDW 14.3 %      RDW-SD 46.1 fl      MPV 11.5 fL      Platelets 177 10*3/mm3     Basic Metabolic Panel [[id number]]  (Abnormal) Collected: 02/03/23 0501    Specimen: Blood Updated: 02/03/23 0553     Glucose 374 mg/dL      BUN 22 mg/dL      Creatinine 0.94 mg/dL      Sodium 136 mmol/L      Potassium 4.2 mmol/L      Chloride 101 mmol/L      CO2 23.7 mmol/L      Calcium 8.7 mg/dL      BUN/Creatinine Ratio 23.4     Anion Gap 11.3 mmol/L      eGFR 65.4 mL/min/1.73     Narrative:      GFR Normal >60  Chronic Kidney Disease <60  Kidney Failure <15      POC Glucose Once [[id number]]  (Abnormal) Collected:  02/02/23 2057    Specimen: Blood Updated: 02/02/23 2059     Glucose 284 mg/dL      Comment: Meter: [id number] : [id number] Brandt TINOCO       POC Glucose Once [[id number]]  (Abnormal) Collected: 02/02/23 1621    Specimen: Blood Updated: 02/02/23 1622     Glucose 306 mg/dL      Comment: Meter: [id number] : [id number] Quoc TINOCO       Basic Metabolic Panel [[id number]]  (Abnormal) Collected: 02/02/23 1252    Specimen: Blood Updated: 02/02/23 1329     Glucose 315 mg/dL      BUN 17 mg/dL      Creatinine 0.96 mg/dL      Sodium 137 mmol/L      Potassium 4.2 mmol/L      Chloride 100 mmol/L      CO2 24.2 mmol/L      Calcium 8.9 mg/dL      BUN/Creatinine Ratio 17.7     Anion Gap 12.8 mmol/L      eGFR 63.8 mL/min/1.73     Narrative:      GFR Normal >60  Chronic Kidney Disease <60  Kidney Failure <15      Hemoglobin A1c [[id number]]  (Abnormal) Collected: 02/02/23 1252    Specimen: Blood Updated: 02/02/23 1324     Hemoglobin A1C 7.40 %     Narrative:      Hemoglobin A1C Ranges:    Increased Risk for Diabetes  5.7% to 6.4%  Diabetes                     >= 6.5%  Diabetic Goal                < 7.0%    CBC (No Diff) [[id number]]  (Abnormal) Collected: 02/02/23 1252    Specimen: Blood Updated: 02/02/23 1311     WBC 6.17 10*3/mm3      RBC 4.30 10*6/mm3      Hemoglobin 11.7 g/dL      Hematocrit 37.2 %      MCV 86.5 fL      MCH 27.2 pg      MCHC 31.5 g/dL      RDW 13.9 %      RDW-SD 44.0 fl      MPV 11.2 fL      Platelets 217 10*3/mm3     POC Glucose Once [[id number]]  (Abnormal) Collected: 02/02/23 1119    Specimen: Blood Updated: 02/02/23 1122     Glucose 226 mg/dL      Comment: Meter: [id number] : [id number] Quoc TINOCO       POC Glucose Once [[id number]]  (Abnormal) Collected: 02/02/23 0911    Specimen: Blood Updated: 02/02/23 1010     Glucose 252 mg/dL      Comment: Meter: [id number] : [id number] Mohsen CHARLES RN       POC Glucose Once [[id number]]  (Abnormal) Collected: 02/02/23 0600    Specimen: Blood  "Updated: 02/02/23 0602     Glucose 296 mg/dL      Comment: Meter: [id number] : [id number] Green Zoie NA       POC Glucose Once [[id number]]  (Normal) Collected: 02/01/23 1602    Specimen: Blood Updated: 02/01/23 1604     Glucose 121 mg/dL      Comment: Meter: [id number] : [id number] Hilliard Natoshia NA       POC Glucose Once [[id number]]  (Abnormal) Collected: 02/01/23 1107    Specimen: Blood Updated: 02/01/23 1109     Glucose 163 mg/dL      Comment: Meter: [id number] : [id number] Hilliard Natoshia NA       POC Glucose Once [[id number]]  (Abnormal) Collected: 02/01/23 0547    Specimen: Blood Updated: 02/01/23 0548     Glucose 169 mg/dL      Comment: Meter: [id number] : [id number] Green Zoie NA       POC Glucose Once [[id number]]  (Normal) Collected: 01/31/23 1700    Specimen: Blood Updated: 01/31/23 1701     Glucose 124 mg/dL      Comment: Meter: [id number] : [id number] Hilliard Natoshia NA           /86 (BP Location: Left arm, Patient Position: Lying)   Pulse 72   Temp 96.7 °F (35.9 °C) (Oral)   Resp 16   Ht 157.5 cm (62\")   Wt 73.9 kg (162 lb 14.7 oz)   SpO2 97%   BMI 29.80 kg/m²     Discharge Exam:  General Appearance:    Alert, cooperative, no distress                          Head:    Normocephalic, without obvious abnormality, atraumatic                          Eyes:                            Throat:   Lips, tongue, gums normal                          Neck:   Supple, symmetrical, trachea midline, no JVD                        Lungs:     Clear to auscultation bilaterally, respirations unlabored                Chest Wall:    No tenderness or deformity                        Heart:    Regular rate and rhythm, S1 and S2 normal, no murmur,no  Rub  or gallop                  Abdomen:     Soft, non-tender, bowel sounds active, no masses, no organomegaly                  Extremities:   Extremities normal, atraumatic, no cyanosis or edema                             Skin:  "  Skin is warm and dry,  no rashes or palpable lesions                  Neurologic:   no focal deficits noted     Disposition:  Home    Activity as tolerated    Diet as tolerated  Diet Order   Procedures   • Diet: Diabetic Diets; Consistent Carbohydrate; Texture: Regular Texture (IDDSI 7); Fluid Consistency: Thin (IDDSI 0)       Patient Instructions:      Discharge Medications      New Medications      Instructions Start Date   amLODIPine 10 MG tablet  Commonly known as: NORVASC   10 mg, Oral, Every 24 Hours Scheduled   Start Date: February 8, 2023     insulin glargine 100 UNIT/ML injection  Commonly known as: LANTUS, SEMGLEE   20 Units, Subcutaneous, Nightly, With pens-and-needles or syringes and needles      insulin lispro 100 UNIT/ML injection  Commonly known as: ADMELOG   0-9 Units, Subcutaneous, 3 Times Daily Before Meals, With pins-and-needles or syringes and needles      metFORMIN 500 MG tablet  Commonly known as: Glucophage   500 mg, Oral, 2 Times Daily With Meals      methylPREDNISolone 4 MG dose pack  Commonly known as: MEDROL   Take as directed on package instructions.      methylPREDNISolone 4 MG dose pack  Commonly known as: MEDROL   Take as directed on package instructions.   Start Date: February 8, 2023     methylPREDNISolone 4 MG dose pack  Commonly known as: MEDROL   Take as directed on package instructions.   Start Date: February 8, 2023     methylPREDNISolone 4 MG dose pack  Commonly known as: MEDROL   Take as directed on package instructions.   Start Date: February 9, 2023        Changes to Medications      Instructions Start Date   oxyCODONE-acetaminophen 5-325 MG per tablet  Commonly known as: PERCOCET  What changed: Another medication with the same name was added. Make sure you understand how and when to take each.   oxycodone-acetaminophen 5 mg-325 mg tablet   TAKE 1 TABLET BY MOUTH THREE TIMES DAILY AS NEEDED      oxyCODONE-acetaminophen 7.5-325 MG per tablet  Commonly known as:  PERCOCET  What changed: You were already taking a medication with the same name, and this prescription was added. Make sure you understand how and when to take each.   1 tablet, Oral, Every 4 Hours PRN      pravastatin 10 MG tablet  Commonly known as: PRAVACHOL  What changed: when to take this   10 mg, Oral, Daily         Continue These Medications      Instructions Start Date   CHOLESTYRAMINE PO   3 tablets, Oral, Daily      cyclobenzaprine 10 MG tablet  Commonly known as: FLEXERIL   10 mg, Oral, 3 Times Daily PRN      dorzolamide 2 % ophthalmic solution  Commonly known as: TRUSOPT   dorzolamide 2 % eye drops      gabapentin 300 MG capsule  Commonly known as: NEURONTIN   300 mg, Oral, 3 Times Daily      glimepiride 2 MG tablet  Commonly known as: AMARYL   1 mg, Oral, Every Morning Before Breakfast      leflunomide 20 MG tablet  Commonly known as: ARAVA   20 mg, Oral, Nightly      lisinopril 5 MG tablet  Commonly known as: PRINIVIL,ZESTRIL   5 mg, Oral, Every Morning      naloxone 4 MG/0.1ML nasal spray  Commonly known as: NARCAN   naloxone 4 mg/actuation nasal spray   CALL 911. SPR CONTENTS OF ONE SPRAYER (0.1ML) INTO ONE NOSTRIL. REPEAT IN 2-3 MIN IF SYMPTOMS OF OPIOID EMERGENCY PERSIST, ALTERNATE NOSTRILS      omeprazole 40 MG capsule  Commonly known as: priLOSEC   40 mg, Oral, Every Evening      ondansetron 4 MG tablet  Commonly known as: Zofran   4 mg, Oral, Every 8 Hours PRN      ondansetron ODT 4 MG disintegrating tablet  Commonly known as: ZOFRAN-ODT   ondansetron 4 mg disintegrating tablet   DISSOLVE 1 TABLET BY MOUTH EVERY 8 HOURS AS NEEDED WITH OXYCODONE      promethazine 12.5 MG tablet  Commonly known as: PHENERGAN   12.5 mg, Oral, Every 6 Hours PRN      rOPINIRole 2 MG tablet  Commonly known as: REQUIP   4 mg, Oral, Nightly      SITagliptin 100 MG tablet  Commonly known as: JANUVIA   100 mg, Oral, Nightly      Toprol XL 50 MG 24 hr tablet  Generic drug: metoprolol succinate XL   75 mg, Oral, Nightly          ASK your doctor about these medications      Instructions Start Date   hydroCHLOROthiazide 12.5 MG capsule  Commonly known as: MICROZIDE   12.5 mg, Oral, Every Morning           Future Appointments   Date Time Provider Department Center   2/23/2023 11:00 AM Robbie Rahman MD [facility]      Follow-up Information     Jeyson Bills MD Follow up.    Specialty: Pain Medicine  Contact information:  [address]  [address]  [place] [address] [phone]             Lucy Turcios APRN Follow up in 1 week(s).    Specialty: Nurse Practitioner  Contact information:  [address] [place] [address] [phone]                       Discharge Order (From admission, onward)     Start     Ordered    02/07/23 1435  Discharge patient  Once        Expected Discharge Date: 02/07/23    Discharge Disposition: Home or Self Care    Physician of Record for Attribution - Please select from Treatment Team: NAVIN MAI [[id number]]    Review needed by CMO to determine Physician of Record: No       Question Answer Comment   Physician of Record for Attribution - Please select from Treatment Team NAVIN MAI    Review needed by CMO to determine Physician of Record No        02/07/23 1445                Total time spent discharging patient including evaluation,post hospitalization follow up,  medication and post hospitalization instructions and education total time exceeds 30 minutes.    Signed:  Navin Mai MD  2/7/2023  14:46 EST

## 2023-02-07 NOTE — PLAN OF CARE
Goal Outcome Evaluation:  Plan of Care Reviewed With: patient           Outcome Evaluation: pt being discharged home with family  Problem: Adult Inpatient Plan of Care  Goal: Plan of Care Review  Outcome: Met  Flowsheets (Taken 2/7/2023 1456)  Plan of Care Reviewed With: patient  Outcome Evaluation: pt being discharged home with family  Goal: Patient-Specific Goal (Individualized)  Outcome: Met  Goal: Absence of Hospital-Acquired Illness or Injury  Outcome: Met  Intervention: Identify and Manage Fall Risk  Recent Flowsheet Documentation  Taken 2/7/2023 1430 by Rohini De Jesus RN  Safety Promotion/Fall Prevention:   assistive device/personal items within reach   clutter free environment maintained   nonskid shoes/slippers when out of bed   room organization consistent   safety round/check completed  Taken 2/7/2023 1200 by Rohini De Jesus RN  Safety Promotion/Fall Prevention:   assistive device/personal items within reach   clutter free environment maintained   nonskid shoes/slippers when out of bed   room organization consistent   safety round/check completed  Taken 2/7/2023 1032 by Rohini De Jesus RN  Safety Promotion/Fall Prevention:   assistive device/personal items within reach   clutter free environment maintained   nonskid shoes/slippers when out of bed   room organization consistent   safety round/check completed  Taken 2/7/2023 0841 by Rohini De Jesus RN  Safety Promotion/Fall Prevention:   assistive device/personal items within reach   clutter free environment maintained   nonskid shoes/slippers when out of bed   room organization consistent   safety round/check completed  Intervention: Prevent Skin Injury  Recent Flowsheet Documentation  Taken 2/7/2023 0841 by Rohini De Jesus RN  Skin Protection: adhesive use limited  Intervention: Prevent and Manage VTE (Venous Thromboembolism) Risk  Recent Flowsheet Documentation  Taken 2/7/2023 1430 by Rohini De Jesus RN  Activity Management:   activity adjusted  per tolerance   activity encouraged  Taken 2/7/2023 1200 by Rohini De Jesus RN  Activity Management:   activity adjusted per tolerance   activity encouraged  Taken 2/7/2023 1032 by Rohini De Jesus RN  Activity Management:   activity adjusted per tolerance   activity encouraged   up ad gurpreet  Taken 2/7/2023 0841 by Rohini De Jesus RN  Activity Management:   activity adjusted per tolerance   activity encouraged   up ad gurpreet  VTE Prevention/Management:   bilateral   sequential compression devices off  Intervention: Prevent Infection  Recent Flowsheet Documentation  Taken 2/7/2023 1430 by Rohini De Jesus RN  Infection Prevention: single patient room provided  Taken 2/7/2023 1200 by Rohini De Jesus RN  Infection Prevention: single patient room provided  Taken 2/7/2023 1032 by Rohini De Jesus RN  Infection Prevention: single patient room provided  Taken 2/7/2023 0841 by Rohini De Jesus RN  Infection Prevention: single patient room provided  Goal: Optimal Comfort and Wellbeing  Outcome: Met  Intervention: Provide Person-Centered Care  Recent Flowsheet Documentation  Taken 2/7/2023 0841 by Rohini De Jesus RN  Trust Relationship/Rapport:   care explained   thoughts/feelings acknowledged  Goal: Readiness for Transition of Care  Outcome: Met

## 2023-02-08 NOTE — OUTREACH NOTE
Prep Survey    Flowsheet Row Responses   [facility] facility patient discharged from? [place]   Is LACE score < 7 ? No   Eligibility Readm Mgmt   Discharge diagnosis **Back pain M54   Does the patient have one of the following disease processes/diagnoses(primary or secondary)? Other   Does the patient have Home health ordered? Yes   What is the Home health agency?  [facility]   Is there a DME ordered? Yes   What DME was ordered? Hospital Bed per [facility]    Prep survey completed? Yes          Priscilla MURPHY - Registered Nurse

## 2023-02-13 NOTE — PROGRESS NOTES
Case Management Discharge Note      Final Note: Discharged to home with [facility] health following and [facility] to deliver the DME. PRIMO Shirley RN, cCP.         Selected Continued Care - Discharged on 2/7/2023 Admission date: 1/30/2023 - Discharge disposition: Home or Self Care    Destination    No services have been selected for the patient.              Durable Medical Equipment     Service Provider Selected Services Address Phone Fax Patient Preferred    [facility] [address] [address] [phone] --          Dialysis/Infusion    No services have been selected for the patient.              Home Medical Care     Service Provider Selected Services Address Phone Fax Patient Preferred    [facility] [address] [place] [address] [phone] --       Internal Comment last updated by Chantale Shirley RN 2/7/2023 1543    Called Olamide at [facility] and left her a voice mail about the patient being discharged. PRIMO Shirley RN CCP.                      Therapy    No services have been selected for the patient.              Community Resources    No services have been selected for the patient.              Community & DME    No services have been selected for the patient.                  Transportation Services  Private: Car    Final Discharge Disposition Code: 06 - home with home health care

## 2023-02-16 ENCOUNTER — READMISSION MANAGEMENT (OUTPATIENT)
Dept: CALL CENTER | Facility: HOSPITAL | Age: 68
End: 2023-02-16
Payer: MEDICARE

## 2023-02-16 NOTE — OUTREACH NOTE
Medical Week 2 Survey    Flowsheet Row Responses   [facility] patient discharged from? [place]   Does the patient have one of the following disease processes/diagnoses(primary or secondary)? Other   Week 2 attempt successful? Yes   Call start time 1449   Discharge diagnosis **Back pain M54   Call end time 1454   Person spoke with today (if not patient) and relationship pt   Meds reviewed with patient/caregiver? Yes   Is the patient having any side effects they believe may be caused by any medication additions or changes? No   Does the patient have all medications ordered at discharge? Yes   Is the patient taking all medications as directed (includes completed medication regime)? Yes   Does the patient have a primary care provider?  Yes   Does the patient have an appointment with their PCP within 7 days of discharge? Yes   Has the patient kept scheduled appointments due by today? Yes   Comments Neurosurgery 2/23/23   What is the Home health agency?  [facility]   Has home health visited the patient within 72 hours of discharge? Yes   Psychosocial issues? No   What is the patient's perception of their health status since discharge? Improving   Is the patient/caregiver able to teach back signs and symptoms related to disease process for when to call PCP? Yes   Is the patient/caregiver able to teach back signs and symptoms related to disease process for when to call 911? Yes   Is the patient/caregiver able to teach back the hierarchy of who to call/visit for symptoms/problems? PCP, Specialist, Home health nurse, Urgent Care, ED, 911 Yes   If the patient is a current smoker, are they able to teach back resources for cessation? Not a smoker   Week 2 Call Completed? Yes   Is the patient interested in additional calls from an ambulatory ?  NOTE:  applies to high risk patients requiring additional follow-up. No   Wrap up additional comments Pt received epidural injections for back pain, and states she is  doing better,  Pain is controlled at 2 out of 1-10 pain scale. Pt had PCP fu appt. Pt has a hospital fu with neurosurgery on 2/23/23.          Ngozi RENNER - Registered Nurse

## 2023-02-22 NOTE — PROGRESS NOTES
"Subjective   Patient ID: Kathy Ramirez is a 68 y.o. female is here today for 2 week hospital follow-up.    Today patient states that she has mild low back pain intermittently.     Patient, Provider, and MA are all wearing a mask in our office today    History of Present Illness     This patient returns today.  She was seen in the hospital earlier this month.  She was having fairly severe pain at that time.  She had an epidural block which helped quite a bit and she is currently not having very much pain at all.    The following portions of the patient's history were reviewed and updated as appropriate: allergies, current medications, past family history, past medical history, past social history, past surgical history and problem list.    Review of Systems    I reviewed the review of systems listed by the patient and discussed by my MA    Objective     Vitals:    02/23/23 1134   BP: (!) 128/8   Cuff Size: Adult   Pulse: 70   Temp: 96.9 °F (36.1 °C)   SpO2: 98%   Weight: 73.9 kg (162 lb 14.7 oz)   Height: 157.5 cm (62\")     Body mass index is 29.8 kg/m².    Tobacco Use: Low Risk    • Smoking Tobacco Use: Never   • Smokeless Tobacco Use: Never   • Passive Exposure: Not on file          Physical Exam  Neurological:      Mental Status: She is alert and oriented to person, place, and time.       Neurologic Exam     Mental Status   Oriented to person, place, and time.           Assessment & Plan   Independent Review of Radiographic Studies:      I personally reviewed the images from the following studies.    Her MRI that was done on 1 February does show multilevel spinal stenosis.    Medical Decision Making:      I told the patient we had previously planned an L1-S1 decompression and fusion and this is a surgery that she should really hold off on as long as she possibly can.  Since she is feeling somewhat better I think we should hold off for now and if she does have a flareup of pain we can try another block.  She will call " if that happens.    Diagnoses and all orders for this visit:    1. Spinal stenosis, lumbar region, with neurogenic claudication (Primary)      Return if symptoms worsen or fail to improve.

## 2023-02-23 ENCOUNTER — OFFICE VISIT (OUTPATIENT)
Dept: NEUROSURGERY | Facility: CLINIC | Age: 68
End: 2023-02-23
Payer: MEDICARE

## 2023-02-23 VITALS
DIASTOLIC BLOOD PRESSURE: 8 MMHG | SYSTOLIC BLOOD PRESSURE: 128 MMHG | HEIGHT: 62 IN | TEMPERATURE: 96.9 F | WEIGHT: 162.92 LBS | OXYGEN SATURATION: 98 % | HEART RATE: 70 BPM | BODY MASS INDEX: 29.98 KG/M2

## 2023-02-23 DIAGNOSIS — M48.062 SPINAL STENOSIS, LUMBAR REGION, WITH NEUROGENIC CLAUDICATION: Primary | ICD-10-CM

## 2023-02-23 PROCEDURE — 99213 OFFICE O/P EST LOW 20 MIN: CPT | Performed by: NEUROLOGICAL SURGERY

## 2023-02-24 ENCOUNTER — TELEPHONE (OUTPATIENT)
Dept: NEUROSURGERY | Facility: CLINIC | Age: 68
End: 2023-02-24
Payer: MEDICARE

## 2023-02-24 ENCOUNTER — READMISSION MANAGEMENT (OUTPATIENT)
Dept: CALL CENTER | Facility: HOSPITAL | Age: 68
End: 2023-02-24
Payer: MEDICARE

## 2023-02-24 DIAGNOSIS — M48.062 SPINAL STENOSIS, LUMBAR REGION, WITH NEUROGENIC CLAUDICATION: Primary | ICD-10-CM

## 2023-02-24 NOTE — OUTREACH NOTE
Medical Week 3 Survey    Flowsheet Row Responses   [facility] patient discharged from? [place]   Does the patient have one of the following disease processes/diagnoses(primary or secondary)? Other   Week 3 attempt successful? Yes   Call start time 1359   Call end time 1409   Discharge diagnosis **Back pain M54   Meds reviewed with patient/caregiver? Yes   Is the patient having any side effects they believe may be caused by any medication additions or changes? No   Does the patient have all medications ordered at discharge? Yes   Is the patient taking all medications as directed (includes completed medication regime)? Yes   Medication comments pt finished steroids   Does the patient have a primary care provider?  Yes   Has the patient kept scheduled appointments due by today? Yes   Comments Neurosurgery 2/23/23--completed   What [facility] the [facility] agency?  [facility]   Has home health visited the patient within 72 hours of discharge? Yes   What DME was ordered? Hospital Bed per [facility]    Has all DME been delivered? Yes  [Pt plans on having bed picked up as reports it is not comfortable ]   Psychosocial issues? No   Did the patient receive a copy of their discharge instructions? Yes   Nursing interventions Reviewed instructions with patient   What is the patient's perception of their health status since discharge? Same  [Pt reports that she went to neurosurgeon yesterday and may have overexerted with walking,  using ice today.  Pt reports that she has concerns with starting P.T. and has sent a message to her neurosurgeon w/concerns.  ]   Is the patient/caregiver able to teach back signs and symptoms related to disease process for when to call PCP? Yes   Is the patient/caregiver able to teach back signs and symptoms related to disease process for when to call 911? Yes   Week 3 Call Completed? Yes   Graduated Yes  [No immediate needs--]          ERICK BECK - Registered Nurse

## 2023-02-24 NOTE — TELEPHONE ENCOUNTER
LM for patient asking patient which [facility] wants to [facility] for PT    ----- Message from Robbie Rahman MD sent at 2/24/2023 12:15 PM EST -----  Regarding: FW: PT Referral  Contact: [phone]  Okay to refer her for lumbar PT.  Lumbar PT 3 times a week for 3 weeks for range of motion and strengthening of back, legs, and abdominals.  ----- Message -----  From: Faviola Mckee MA  Sent: 2/24/2023  12:09 PM EST  To: Robbie Rahman MD  Subject: FW: PT Referral                                  Dr. Rahman will this be ok?  ----- Message -----  From: Kathy Ramirez  Sent: 2/23/2023   3:40 PM EST  To: [facility]  Subject: PT Referral                                      I’d like to ask Dr. Rahman to send me a PT referral if he thinks I would benefit from it.  I had been seeing [facility] prior to my hospital admit.  Thank you.

## 2023-02-24 NOTE — TELEPHONE ENCOUNTER
S/w patient and she wasn't to go to [facility] for PT, informed her that I would send info(referral)  to [facility]----- Message from Robbie Rahman MD sent at 2/24/2023 12:15 PM EST -----  Regarding: FW: PT Referral  Contact: [phone]  Okay to refer her for lumbar PT.  Lumbar PT 3 times a week for 3 weeks for range of motion and strengthening of back, legs, and abdominals.  ----- Message -----  From: Faviola Mckee MA  Sent: 2/24/2023  12:09 PM EST  To: Robbie Rahman MD  Subject: FW: PT Referral                                  Dr. Rahman will this be ok?  ----- Message -----  From: Kathy Ramirez  Sent: 2/23/2023   3:40 PM EST  To: [facility]  Subject: PT Referral                                      I’d like to ask Dr. Rahman to send me a PT referral if he thinks I would benefit from it.  I had been seeing [facility] prior to my hospital admit.  Thank you.

## 2023-10-03 DIAGNOSIS — M25.512 LEFT SHOULDER PAIN, UNSPECIFIED CHRONICITY: Primary | ICD-10-CM

## 2023-10-18 ENCOUNTER — HOSPITAL ENCOUNTER (OUTPATIENT)
Dept: GENERAL RADIOLOGY | Facility: HOSPITAL | Age: 68
Discharge: HOME | End: 2023-10-18
Admitting: PHYSICIAN ASSISTANT
Payer: MEDICARE

## 2023-10-18 ENCOUNTER — OFFICE VISIT (OUTPATIENT)
Dept: ORTHOPEDIC SURGERY | Facility: CLINIC | Age: 68
End: 2023-10-18
Payer: MEDICARE

## 2023-10-18 VITALS — HEIGHT: 62 IN | TEMPERATURE: 98.6 F | BODY MASS INDEX: 28.08 KG/M2 | WEIGHT: 152.6 LBS

## 2023-10-18 DIAGNOSIS — M12.811 RIGHT ROTATOR CUFF TEAR ARTHROPATHY: Primary | ICD-10-CM

## 2023-10-18 DIAGNOSIS — M25.512 LEFT SHOULDER PAIN, UNSPECIFIED CHRONICITY: ICD-10-CM

## 2023-10-18 DIAGNOSIS — M75.101 RIGHT ROTATOR CUFF TEAR ARTHROPATHY: Primary | ICD-10-CM

## 2023-10-18 PROCEDURE — 73030 X-RAY EXAM OF SHOULDER: CPT

## 2023-10-18 RX ADMIN — LIDOCAINE HYDROCHLORIDE 2 ML: 10 INJECTION, SOLUTION EPIDURAL; INFILTRATION; INTRACAUDAL; PERINEURAL at 13:50

## 2023-10-18 RX ADMIN — METHYLPREDNISOLONE ACETATE 160 MG: 80 INJECTION, SUSPENSION INTRA-ARTICULAR; INTRALESIONAL; INTRAMUSCULAR; SOFT TISSUE at 13:50

## 2023-10-18 NOTE — PROGRESS NOTES
"Chief Complaint  Pain and Establish Care of the Left Shoulder    Subjective    History of Present Illness      Kathy Ramirez is a 68 y.o. female who presents to [facility] for complaint of Shoulder Pain: Patient complains of left shoulder pain.     The patient has experienced left shoulder pain for a long time.  She has been experiencing issues with her left shoulder since 2018 or 2019. She has received an injection, around the same time, that was helpful. She experiences pain at night while lying in bed. She experiences trouble while raising her upper extremity grabbing things out of the cabinet or washing her hair. She is right-hand dominant. She experiences pain in the anterior aspect of the left shoulder.     She underwent surgery of the right shoulder, performed by Dr. Darío Siu in 03/2020. 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 She had a revision surgery performed by Dr. Delaney in 08/2020. She  She has had a DEXA scan performed in the past after multiple fractures, but reports it was normal. She has fractured her femur twice. She is being transferred to Dr. Priscilla Garces for rheumatology by Dr. Robbie Clark. She states she was advised by Dr. Yarbrough to not take bone density medications or allow anyone to operate on her back as it will create weaker bones.      Objective   Vital Signs:   Temp 98.6 °F (37 °C)   Ht 157.5 cm (62\")   Wt 69.2 kg (152 lb 9.6 oz)   BMI 27.91 kg/m²       Physical Exam  Vitals and nursing note reviewed.   Constitutional:       Appearance: Normal appearance.   Pulmonary:      Effort: Pulmonary effort is normal.   Skin:     General: Skin is warm and dry.      Capillary Refill: Capillary refill takes less than 2 seconds.   Neurological:      General: No focal deficit present.      Mental Status: He is alert and oriented to person, place, and time. Mental " status is at baseline.   Psychiatric:         Mood and Affect: Mood normal.         Behavior: Behavior normal.         Thought Content: Thought content normal.         Judgment: Judgment normal.         Ortho Exam   LEFT shoulder  Rotator cuff function is mildly impaired.   Positive for a high riding humeral head.  Positive for tenderness with palpation of AC joint.   Range of motion: forward flexion 0-130 degrees, active abduction 0-130 degrees.   Drop arm sign is positive.   Positive for pain and limitation with external rotation against resistance.   Skin and soft tissues are within normal limits.  Axillary nerve function is well preserved.   Neurovascular status is intact.      Result Review :   Radiologic studies - see below for interpretation  LEFT shoulder xrays   four views were ordered by Sharron. Performed at [facility] on 10/18/2023. Images were independently viewed and interpreted by myself, my impression as follows:  Findings: Moderate to severe arthritic change at the glenohumeral joint, there is bone-on-bone articulation between the humeral head and the bony glenoid. There is also proximal migration of the humeral head in relation to the bony glenoid, there is narrowing between the superior aspect of the humeral head and the undersurface of the acromion. AC joint with moderate degenerative changes.   Bony lesion: no  Soft tissues: within normal limits  Joint spaces: decreased  Hardware appropriately positioned: not applicable  Prior studies available for comparison: no        PROCEDURE  - Large Joint Arthrocentesis: L subacromial bursa on 10/18/2023 1:50 PM  Indications: pain  Details: 25 G needle, posterior approach  Medications: 2 mL lidocaine PF 1% 1 %; 160 mg methylPREDNISolone acetate 80 MG/ML  Outcome: tolerated well, no immediate complications  Procedure, treatment alternatives, risks and benefits explained, specific risks discussed. Consent was given by the patient.  Immediately prior to procedure a time out was called to verify the correct patient, procedure, equipment, support staff and site/side marked as required. Patient was prepped and draped in the usual sterile fashion.                  Assessment   Assessment and Plan    Problem List Items Addressed This Visit          Musculoskeletal and Injuries    Right rotator cuff tear arthropathy - Primary    Relevant Orders    - Large Joint Arthrocentesis: L subacromial bursa           Follow Up   Discussion of any imaging in detail. Discussion of orthopaedic goals.  Risk, benefits, and merits of treatment alternatives reviewed with the patient. Treatment alternatives include: oral anti-inflammatories/NSAID, intra-articular steroid injection, and surgery  Ice, heat, and/or modalities as beneficial  Risks of minor surgical procedure/intra-articular injection, including but not limited to pain, bleeding, infection into the joint, pigment skin changes related to steroid administration, increased blood sugar levels secondary to steroid administration, scar, recurrence of pain, and tendon rupture associated with steroid administration and possible need for further surgery reviewed with patient.  She accepts these risks and wishes to proceed with procedure. Injected patient's right shoulder joint(s) with Depo-Medrol from a(n) posterior approach.  Patient tolerated the procedure well and no complications were encountered.   Watch for signs and symptoms of infection  Call or notify for any adverse effect from injection therapy  Patient is encouraged to call or return for any issues or concerns.  Follow up in 3 months  Patient was given instructions and counseling regarding her condition or for health maintenance advice. Please see specific information pulled into the AVS if appropriate.     Kiran Mcdermott PA-C    Date of Encounter: 10/18/2023     Transcribed from ambient dictation for Kiran Mcdermott PA-C by Elenita  Hermann.  10/18/23   14:52 EDT    Patient or patient representative verbalized consent to the visit recording.  I have personally performed the services described in this document as transcribed by the above individual, and it is both accurate and complete.  Kiran Mcdermott PA-C  10/25/2023  09:40 EDT

## 2023-10-20 ENCOUNTER — PATIENT ROUNDING (BHMG ONLY) (OUTPATIENT)
Dept: ORTHOPEDIC SURGERY | Facility: CLINIC | Age: 68
End: 2023-10-20
Payer: MEDICARE

## 2023-10-20 NOTE — PROGRESS NOTES
October 20, 2023      A [employer] Message has been sent to the patient for PATIENT ROUNDING with [facility]

## 2023-10-25 PROBLEM — M75.101 RIGHT ROTATOR CUFF TEAR ARTHROPATHY: Status: ACTIVE | Noted: 2023-10-25

## 2023-10-25 PROBLEM — M12.811 RIGHT ROTATOR CUFF TEAR ARTHROPATHY: Status: ACTIVE | Noted: 2023-10-25

## 2023-10-25 RX ORDER — METHYLPREDNISOLONE ACETATE 80 MG/ML
160 INJECTION, SUSPENSION INTRA-ARTICULAR; INTRALESIONAL; INTRAMUSCULAR; SOFT TISSUE
Status: COMPLETED | OUTPATIENT
Start: 2023-10-18 | End: 2023-10-18

## 2023-10-25 RX ORDER — LIDOCAINE HYDROCHLORIDE 10 MG/ML
2 INJECTION, SOLUTION EPIDURAL; INFILTRATION; INTRACAUDAL; PERINEURAL
Status: COMPLETED | OUTPATIENT
Start: 2023-10-18 | End: 2023-10-18

## 2024-10-03 NOTE — PLAN OF CARE
Continue current dose of levothyroxine  Orders:    levothyroxine (Synthroid, Levoxyl) 112 mcg tablet; Take 1 tablet (112 mcg) by mouth once daily.     Goal Outcome Evaluation:  Plan of Care Reviewed With: patient        Progress: no change  Outcome Evaluation: Patient given two doses of PRN percocet this shift for stable pain in back. Patient's blood pressure better after administration of IV hydralazine, headache resolved. Patient AOx4, no other new complaints. Patient ambulates independently, looking forward to possible discharge home today, will continue to monitor.

## 2025-06-29 ENCOUNTER — HOSPITAL ENCOUNTER (EMERGENCY)
Facility: HOSPITAL | Age: 70
Discharge: HOME | End: 2025-06-29
Attending: EMERGENCY MEDICINE | Admitting: EMERGENCY MEDICINE
Payer: MEDICARE

## 2025-06-29 ENCOUNTER — APPOINTMENT (OUTPATIENT)
Dept: GENERAL RADIOLOGY | Facility: HOSPITAL | Age: 70
End: 2025-06-29
Payer: MEDICARE

## 2025-06-29 VITALS
TEMPERATURE: 98 F | OXYGEN SATURATION: 100 % | RESPIRATION RATE: 18 BRPM | SYSTOLIC BLOOD PRESSURE: 175 MMHG | DIASTOLIC BLOOD PRESSURE: 80 MMHG | HEART RATE: 55 BPM

## 2025-06-29 DIAGNOSIS — R53.83 OTHER FATIGUE: Primary | ICD-10-CM

## 2025-06-29 DIAGNOSIS — S60.426A: ICD-10-CM

## 2025-06-29 DIAGNOSIS — R06.02 SHORTNESS OF BREATH: ICD-10-CM

## 2025-06-29 LAB
ALBUMIN SERPL-MCNC: 3.9 G/DL (ref 3.5–5.2)
ALBUMIN/GLOB SERPL: 1.6 G/DL
ALP SERPL-CCNC: 82 U/L (ref 39–117)
ALT SERPL W P-5'-P-CCNC: 26 U/L (ref 1–33)
ANION GAP SERPL CALCULATED.3IONS-SCNC: 13 MMOL/L (ref 5–15)
AST SERPL-CCNC: 20 U/L (ref 1–32)
B PARAPERT DNA SPEC QL NAA+PROBE: NOT DETECTED
B PERT DNA SPEC QL NAA+PROBE: NOT DETECTED
BASOPHILS # BLD AUTO: 0.04 10*3/MM3 (ref 0–0.2)
BASOPHILS NFR BLD AUTO: 0.7 % (ref 0–1.5)
BILIRUB SERPL-MCNC: 1.4 MG/DL (ref 0–1.2)
BILIRUB UR QL STRIP: NEGATIVE
BUN SERPL-MCNC: 10 MG/DL (ref 8–23)
BUN/CREAT SERPL: 12.7 (ref 7–25)
C PNEUM DNA NPH QL NAA+NON-PROBE: NOT DETECTED
CALCIUM SPEC-SCNC: 8.8 MG/DL (ref 8.6–10.5)
CHLORIDE SERPL-SCNC: 107 MMOL/L (ref 98–107)
CLARITY UR: CLEAR
CO2 SERPL-SCNC: 22 MMOL/L (ref 22–29)
COLOR UR: YELLOW
CREAT SERPL-MCNC: 0.79 MG/DL (ref 0.57–1)
DEPRECATED RDW RBC AUTO: 46 FL (ref 37–54)
EGFRCR SERPLBLD CKD-EPI 2021: 79.6 ML/MIN/1.73
EOSINOPHIL # BLD AUTO: 0.17 10*3/MM3 (ref 0–0.4)
EOSINOPHIL NFR BLD AUTO: 3 % (ref 0.3–6.2)
ERYTHROCYTE [DISTWIDTH] IN BLOOD BY AUTOMATED COUNT: 12.5 % (ref 12.3–15.4)
FLUAV SUBTYP SPEC NAA+PROBE: NOT DETECTED
FLUBV RNA NPH QL NAA+NON-PROBE: NOT DETECTED
GLOBULIN UR ELPH-MCNC: 2.5 GM/DL
GLUCOSE SERPL-MCNC: 152 MG/DL (ref 65–99)
GLUCOSE UR STRIP-MCNC: NEGATIVE MG/DL
HADV DNA SPEC NAA+PROBE: NOT DETECTED
HCOV 229E RNA SPEC QL NAA+PROBE: NOT DETECTED
HCOV HKU1 RNA SPEC QL NAA+PROBE: NOT DETECTED
HCOV NL63 RNA SPEC QL NAA+PROBE: NOT DETECTED
HCOV OC43 RNA SPEC QL NAA+PROBE: NOT DETECTED
HCT VFR BLD AUTO: 39.8 % (ref 34–46.6)
HGB BLD-MCNC: 13.5 G/DL (ref 12–15.9)
HGB UR QL STRIP.AUTO: NEGATIVE
HMPV RNA NPH QL NAA+NON-PROBE: NOT DETECTED
HPIV1 RNA ISLT QL NAA+PROBE: NOT DETECTED
HPIV2 RNA SPEC QL NAA+PROBE: NOT DETECTED
HPIV3 RNA NPH QL NAA+PROBE: NOT DETECTED
HPIV4 P GENE NPH QL NAA+PROBE: NOT DETECTED
IMM GRANULOCYTES # BLD AUTO: 0.01 10*3/MM3 (ref 0–0.05)
IMM GRANULOCYTES NFR BLD AUTO: 0.2 % (ref 0–0.5)
KETONES UR QL STRIP: NEGATIVE
LEUKOCYTE ESTERASE UR QL STRIP.AUTO: NEGATIVE
LYMPHOCYTES # BLD AUTO: 1.53 10*3/MM3 (ref 0.7–3.1)
LYMPHOCYTES NFR BLD AUTO: 26.7 % (ref 19.6–45.3)
M PNEUMO IGG SER IA-ACNC: NOT DETECTED
MCH RBC QN AUTO: 33.8 PG (ref 26.6–33)
MCHC RBC AUTO-ENTMCNC: 33.9 G/DL (ref 31.5–35.7)
MCV RBC AUTO: 99.7 FL (ref 79–97)
MONOCYTES # BLD AUTO: 0.74 10*3/MM3 (ref 0.1–0.9)
MONOCYTES NFR BLD AUTO: 12.9 % (ref 5–12)
NEUTROPHILS NFR BLD AUTO: 3.24 10*3/MM3 (ref 1.7–7)
NEUTROPHILS NFR BLD AUTO: 56.5 % (ref 42.7–76)
NITRITE UR QL STRIP: NEGATIVE
NRBC BLD AUTO-RTO: 0 /100 WBC (ref 0–0.2)
NT-PROBNP SERPL-MCNC: 765 PG/ML (ref 0–900)
PH UR STRIP.AUTO: 5.5 [PH] (ref 5–8)
PLATELET # BLD AUTO: 135 10*3/MM3 (ref 140–450)
PMV BLD AUTO: 12.5 FL (ref 6–12)
POTASSIUM SERPL-SCNC: 3.8 MMOL/L (ref 3.5–5.2)
PROT SERPL-MCNC: 6.4 G/DL (ref 6–8.5)
PROT UR QL STRIP: NEGATIVE
QT INTERVAL: 451 MS
QTC INTERVAL: 447 MS
RBC # BLD AUTO: 3.99 10*6/MM3 (ref 3.77–5.28)
RHINOVIRUS RNA SPEC NAA+PROBE: NOT DETECTED
RSV RNA NPH QL NAA+NON-PROBE: NOT DETECTED
SARS-COV-2 RNA NPH QL NAA+NON-PROBE: NOT DETECTED
SODIUM SERPL-SCNC: 142 MMOL/L (ref 136–145)
SP GR UR STRIP: 1.01 (ref 1–1.03)
TROPONIN T SERPL HS-MCNC: 13 NG/L
UROBILINOGEN UR QL STRIP: NORMAL
WBC NRBC COR # BLD AUTO: 5.73 10*3/MM3 (ref 3.4–10.8)

## 2025-06-29 PROCEDURE — 99284 EMERGENCY DEPT VISIT MOD MDM: CPT

## 2025-06-29 PROCEDURE — 80053 COMPREHEN METABOLIC PANEL: CPT | Performed by: EMERGENCY MEDICINE

## 2025-06-29 PROCEDURE — 83880 ASSAY OF NATRIURETIC PEPTIDE: CPT | Performed by: EMERGENCY MEDICINE

## 2025-06-29 PROCEDURE — 81003 URINALYSIS AUTO W/O SCOPE: CPT | Performed by: EMERGENCY MEDICINE

## 2025-06-29 PROCEDURE — 93005 ELECTROCARDIOGRAM TRACING: CPT | Performed by: EMERGENCY MEDICINE

## 2025-06-29 PROCEDURE — 84484 ASSAY OF TROPONIN QUANT: CPT | Performed by: EMERGENCY MEDICINE

## 2025-06-29 PROCEDURE — 71045 X-RAY EXAM CHEST 1 VIEW: CPT

## 2025-06-29 PROCEDURE — 85025 COMPLETE CBC W/AUTO DIFF WBC: CPT | Performed by: EMERGENCY MEDICINE

## 2025-06-29 PROCEDURE — 0202U NFCT DS 22 TRGT SARS-COV-2: CPT | Performed by: EMERGENCY MEDICINE

## 2025-06-29 NOTE — ED NOTES
"Patient has multiple complaints:  Intermittent right shoulder \"throbbing\"  Diarrhea that lasted a week and ended 3 days ago  SOA this morning that woke her up at 0400  Large blister right 5th digit that appeared this morning  Chills  Recent ED visit for \"I couldn't stay awake. I felt very tired. I didn't have any energy.\"   "

## 2025-06-29 NOTE — ED TRIAGE NOTES
Patient to ED via pv from home c/o generalized weakness and chills x 2 weeks. Patient also has blister on right 5th finger that she noticed today, denies any burns or injury.

## 2025-06-29 NOTE — ED PROVIDER NOTES
EMERGENCY DEPARTMENT ENCOUNTER  Room Number:  36/36  PCP: Provider, No Known  Independent Historians: Patient and Family      HPI:  Chief Complaint: had concerns including Weakness - Generalized and Blister.       Context: Kathy Ramirez is a 70 y.o. female with a medical history of diabetes, spinal stenosis, chronic pain who presents to the ED c/o acute not feeling well.  The patient reports for last 2 weeks she has felt generally tired.  She reports a slight cough.  She states she feels cold in her house.  She does have a history of rheumatoid arthritis and reports she has chronic pain in her right hip.  She states that her blood pressure is elevated probably due to that.  She states that this morning at 4 AM she felt some mild shortness of breath which got better when she sat in a recliner.  She denies any chest pain.  She denies fevers.  She has does not typically have shortness of breath.  She reports she has eye surgery scheduled in the morning and was concerned that she may not need to proceed with eye surgery.  She does report she has had some urinary frequency and her right fifth digit was aching yesterday.  She reports she developed a blister on her right fifth digit today.  She denies any finger pain.  She states she has had some throbbing in her upper back after moving around for the last 1-1/2 weeks.  She denies any focal weakness or numbness.      Review of prior external notes (non-ED) -and- Review of prior external test results outside of this encounter: CBC dated 6/18/2025 was normal    Prescription drug monitoring program review:         PAST MEDICAL HISTORY  Active Ambulatory Problems     Diagnosis Date Noted    Spinal stenosis, lumbar region, with neurogenic claudication 05/09/2017    Lumbar radiculitis 05/09/2017    Diabetes mellitus 07/02/2018    Hypertension 07/02/2018    Rheumatoid arthritis 07/02/2018    Cervical radiculitis 10/18/2018    Acute pain of left shoulder 11/08/2018    Family history  of early CAD 2019    Hyperlipidemia LDL goal <100 2019    Orthostasis 2019    DDD (degenerative disc disease), lumbar 11/15/2019    History of echocardiogram 2019    S/P reverse total shoulder arthroplasty, right 2020    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    Chronic pain disorder 2023    Encounter for long-term (current) use of insulin 2023    Lumbar trigger point syndrome 2022    Sciatica 2023    Osteoarthritis 2023    Degeneration of lumbar intervertebral disc 2023    Uncontrolled pain 2023    Back pain 2023    Low back pain 2023    Right rotator cuff tear arthropathy 10/25/2023     Resolved Ambulatory Problems     Diagnosis Date Noted    Cervical spinal stenosis 2018    Surgery follow-up examination 2018     Past Medical History:   Diagnosis Date    Arm pain     Chronic low back pain     Dysrhythmia     GERD (gastroesophageal reflux disease)     Herpes     History of hepatitis A     History of transfusion     Humerus shaft fracture     Hyperlipidemia     IBS (irritable bowel syndrome)     Lumbar canal stenosis     Lumbar radiculopathy     PONV (postoperative nausea and vomiting)     Psoriasis     Psoriatic arthritis     RLS (restless legs syndrome)     Sinus tachycardia          PAST SURGICAL HISTORY  Past Surgical History:   Procedure Laterality Date    ANTERIOR CERVICAL DISCECTOMY W/ FUSION N/A 2018    Procedure: C5 6 anterior cervical discectomy, fusion and instrumentation;  Surgeon: Robbie Rahman MD;  Location: [facility];  Service: Neurosurgery    APPENDECTOMY  1972     SECTION      5 csection    CHOLECYSTECTOMY  1993    COLONOSCOPY  2012    HYSTERECTOMY  1991    KNEE ARTHROPLASTY Left 2008    KNEE ARTHROPLASTY Right 2010    ORIF HUMERUS FRACTURE Right 2020    Procedure: REPAIR NONUNION RIGHT HUMERUS SHAFT FRACTURE, EXPLORATION RADIAL  NERVE;  Surgeon: Betito Delaney MD;  Location: [facility] OR;  Service: Orthopedics;  Laterality: Right;    TOTAL SHOULDER ARTHROPLASTY W/ DISTAL CLAVICLE EXCISION Right 3/10/2020    Procedure: RIGHT TOTAL SHOULDER REVERSE ARTHROPLASTY, ORIF OF PERIPROSTHETIC FRACTURE;  Surgeon: Festus Siu MD;  Location: [facility];  Service: Orthopedics;  Laterality: Right;    WISDOM TOOTH EXTRACTION  1980'S         FAMILY HISTORY  Family History   Problem Relation Age of Onset    Cerebral aneurysm Mother     Leukemia Father         Has pacemaker    Prostate cancer Father     Skin cancer Father     Breast cancer Sister     Hyperlipidemia Sister     Hypertension Brother     Hyperlipidemia Brother     Irregular heart beat Brother     Malig Hyperthermia Neg Hx          SOCIAL HISTORY  Social History     Socioeconomic History    Marital status:     Number of children: 5    Years of education: MA   Tobacco Use    Smoking status: Never    Smokeless tobacco: Never   Vaping Use    Vaping status: Never Used   Substance and Sexual Activity    Alcohol use: Yes     Comment: rare    Drug use: No    Sexual activity: Defer       Chronic or social conditions impacting patient care (Social Determinants of Health):  Social Drivers of Health     Tobacco Use: Low Risk  (2/27/2025)    Received from [facility]    Patient History     Smoking Tobacco Use: Never     Smokeless Tobacco Use: Never     Passive Exposure: Not on file   Alcohol Use: Not At Risk (1/30/2023)    AUDIT-C     Frequency of Alcohol Consumption: Monthly or less     Average Number of Drinks: 1 or 2     Frequency of Binge Drinking: Never   Financial Resource Strain: Not on file   Food Insecurity: No Food Insecurity (1/12/2024)    Received from [facility]    Food Insecurity     : Not on file     : Not on file   Transportation Needs: Not on file   Physical Activity: Not on file   Stress: Not on file   Social Connections: Low Risk   (1/12/2024)    Received from [facility]    Family and Community Support     Help with Day to Day Activities: Not on file     Feeling Lonely or Isolated: Not on file   Interpersonal Safety: Not At Risk (6/29/2025)    Abuse Screen     Unsafe at Home or Work/School: no     Feels Threatened by Someone?: no     Does Anyone Keep You from Contacting Others or Doint Things Outside the Home?: no     Physical Sign of Abuse Present: no   Depression: Unknown (1/12/2024)    Received from [facility]    Depression     : Not on file   Housing Stability: Low Risk  (1/12/2024)    Received from [facility]    Housing Stability     : Not on file     : Not on file   Utilities: Not on file   Health Literacy: Unknown (2/1/2023)    Education     Help with school or training?: Not on file     Preferred Language: English   Employment: Unknown (1/12/2024)    Received from [facility]    Employment     : Not on file   Disabilities: Not At Risk (1/30/2023)    Disabilities     Concentrating, Remembering, or Making Decisions Difficulty: no     Doing Errands Independently Difficulty: no       ALLERGIES  Patient has no known allergies.      REVIEW OF SYSTEMS  Review of Systems  Included in HPI  All systems reviewed and negative except for those discussed in HPI.      PHYSICAL EXAM    I have reviewed the triage vital signs and nursing notes.    ED Triage Vitals   Temp Heart Rate Resp BP SpO2   06/29/25 1208 06/29/25 1208 06/29/25 1208 06/29/25 1223 06/29/25 1208   98 °F (36.7 °C) 69 18 (!) 217/79 100 %      Temp src Heart Rate Source Patient Position BP Location FiO2 (%)   -- -- -- -- --              Physical Exam  GENERAL: Awake, alert, no acute distress  SKIN: Warm, dry  HENT: Normocephalic, atraumatic  EYES: no scleral icterus  CV: regular rhythm, regular rate  RESPIRATORY: normal effort, lungs clear  ABDOMEN: soft, nontender, nondistended  MUSCULOSKELETAL: no deformity.  Clear  fluid-filled blister on the dorsal distal fifth digit without any surrounding erythema.  No sign of infection.  Nontender.  NEURO: alert, moves all extremities, follows commands            LAB RESULTS  Recent Results (from the past 24 hours)   Urinalysis With Microscopic If Indicated (No Culture) - Urine, Clean Catch    Collection Time: 06/29/25 12:57 PM    Specimen: Urine, Clean Catch   Result Value Ref Range    Color, UA Yellow Yellow, Straw    Appearance, UA Clear Clear    pH, UA 5.5 5.0 - 8.0    Specific Gravity, UA 1.006 1.005 - 1.030    Glucose, UA Negative Negative    Ketones, UA Negative Negative    Bilirubin, UA Negative Negative    Blood, UA Negative Negative    Protein, UA Negative Negative    Leuk Esterase, UA Negative Negative    Nitrite, UA Negative Negative    Urobilinogen, UA 0.2 E.U./dL 0.2 - 1.0 E.U./dL   ECG 12 Lead Dyspnea    Collection Time: 06/29/25  1:12 PM   Result Value Ref Range    QT Interval 451 ms    QTC Interval 447 ms   Respiratory Panel PCR w/COVID-19(SARS-CoV-2) DANIEL/CLEM/YANIRA/PAD/COR/JUAN In-House, NP Swab in [facility], 2 HR TAT - Swab, Nasopharynx    Collection Time: 06/29/25  1:18 PM    Specimen: Nasopharynx; Swab   Result Value Ref Range    ADENOVIRUS, PCR Not Detected Not Detected    Coronavirus 229E Not Detected Not Detected    Coronavirus HKU1 Not Detected Not Detected    Coronavirus NL63 Not Detected Not Detected    Coronavirus OC43 Not Detected Not Detected    COVID19 Not Detected Not Detected - Ref. Range    Human Metapneumovirus Not Detected Not Detected    Human Rhinovirus/Enterovirus Not Detected Not Detected    Influenza A PCR Not Detected Not Detected    Influenza B PCR Not Detected Not Detected    Parainfluenza Virus 1 Not Detected Not Detected    Parainfluenza Virus 2 Not Detected Not Detected    Parainfluenza Virus 3 Not Detected Not Detected    Parainfluenza Virus 4 Not Detected Not Detected    RSV, PCR Not Detected Not Detected    Bordetella pertussis pcr Not Detected  Not Detected    Bordetella parapertussis PCR Not Detected Not Detected    Chlamydophila pneumoniae PCR Not Detected Not Detected    Mycoplasma pneumo by PCR Not Detected Not Detected   Comprehensive Metabolic Panel    Collection Time: 06/29/25  1:25 PM    Specimen: Blood   Result Value Ref Range    Glucose 152 (H) 65 - 99 mg/dL    BUN 10.0 8.0 - 23.0 mg/dL    Creatinine 0.79 0.57 - 1.00 mg/dL    Sodium 142 136 - 145 mmol/L    Potassium 3.8 3.5 - 5.2 mmol/L    Chloride 107 98 - 107 mmol/L    CO2 22.0 22.0 - 29.0 mmol/L    Calcium 8.8 8.6 - 10.5 mg/dL    Total Protein 6.4 6.0 - 8.5 g/dL    Albumin 3.9 3.5 - 5.2 g/dL    ALT (SGPT) 26 1 - 33 U/L    AST (SGOT) 20 1 - 32 U/L    Alkaline Phosphatase 82 39 - 117 U/L    Total Bilirubin 1.4 (H) 0.0 - 1.2 mg/dL    Globulin 2.5 gm/dL    A/G Ratio 1.6 g/dL    BUN/Creatinine Ratio 12.7 7.0 - 25.0    Anion Gap 13.0 5.0 - 15.0 mmol/L    eGFR 79.6 >60.0 mL/min/1.73   CBC Auto Differential    Collection Time: 06/29/25  1:25 PM    Specimen: Blood   Result Value Ref Range    WBC 5.73 3.40 - 10.80 10*3/mm3    RBC 3.99 3.77 - 5.28 10*6/mm3    Hemoglobin 13.5 12.0 - 15.9 g/dL    Hematocrit 39.8 34.0 - 46.6 %    MCV 99.7 (H) 79.0 - 97.0 fL    MCH 33.8 (H) 26.6 - 33.0 pg    MCHC 33.9 31.5 - 35.7 g/dL    RDW 12.5 12.3 - 15.4 %    RDW-SD 46.0 37.0 - 54.0 fl    MPV 12.5 (H) 6.0 - 12.0 fL    Platelets 135 (L) 140 - 450 10*3/mm3    Neutrophil % 56.5 42.7 - 76.0 %    Lymphocyte % 26.7 19.6 - 45.3 %    Monocyte % 12.9 (H) 5.0 - 12.0 %    Eosinophil % 3.0 0.3 - 6.2 %    Basophil % 0.7 0.0 - 1.5 %    Immature Grans % 0.2 0.0 - 0.5 %    Neutrophils, Absolute 3.24 1.70 - 7.00 10*3/mm3    Lymphocytes, Absolute 1.53 0.70 - 3.10 10*3/mm3    Monocytes, Absolute 0.74 0.10 - 0.90 10*3/mm3    Eosinophils, Absolute 0.17 0.00 - 0.40 10*3/mm3    Basophils, Absolute 0.04 0.00 - 0.20 10*3/mm3    Immature Grans, Absolute 0.01 0.00 - 0.05 10*3/mm3    nRBC 0.0 0.0 - 0.2 /100 WBC   High Sensitivity Troponin T     Collection Time: 06/29/25  1:25 PM    Specimen: Blood   Result Value Ref Range    HS Troponin T 13 <14 ng/L   BNP    Collection Time: 06/29/25  1:25 PM    Specimen: Blood   Result Value Ref Range    proBNP 765.0 0.0 - 900.0 pg/mL         RADIOLOGY  XR Chest 1 View  Result Date: 6/29/2025  XR CHEST 1 VW-  HISTORY: Female who is 70 years-old, short of breath  TECHNIQUE: Frontal view of the chest  COMPARISON: 7/21/2022  FINDINGS: Heart, mediastinum and pulmonary vasculature are unremarkable. No focal pulmonary consolidation, pleural effusion, or pneumothorax. No acute osseous process.      No evidence for acute pulmonary process. Follow-up as clinical indications persist.  This report was finalized on 6/29/2025 1:14 PM by Dr. Raymundo Gray M.D on Workstation: [id number]          MEDICATIONS GIVEN IN ER  Medications - No data to display      ORDERS PLACED DURING THIS VISIT:  Orders Placed This Encounter   Procedures    Respiratory Panel PCR w/COVID-19(SARS-CoV-2) DANIEL/CLEM/YANIRA/PAD/COR/JUAN In-House, NP Swab in UTM/VTM, 2 HR TAT - Swab, Nasopharynx    XR Chest 1 View    Comprehensive Metabolic Panel    CBC Auto Differential    Urinalysis With Microscopic If Indicated (No Culture) - Urine, Clean Catch    High Sensitivity Troponin T    BNP    ECG 12 Lead Dyspnea    CBC & Differential         OUTPATIENT MEDICATION MANAGEMENT:  No current Epic-ordered facility-administered medications on file.     Current Outpatient Medications Ordered in Epic   Medication Sig Dispense Refill    CHOLESTYRAMINE PO Take 3 tablets by mouth Daily.      cyclobenzaprine (FLEXERIL) 10 MG tablet Take 1 tablet by mouth 3 (Three) Times a Day As Needed for Muscle Spasms. 60 tablet 0    dorzolamide (TRUSOPT) 2 % ophthalmic solution dorzolamide 2 % eye drops      gabapentin (NEURONTIN) 300 MG capsule Take 1 capsule by mouth 3 (Three) Times a Day.      glimepiride (AMARYL) 2 MG tablet Take 0.5 tablets by mouth Every Morning Before Breakfast.       hydroCHLOROthiazide (MICROZIDE) 12.5 MG capsule Take 1 capsule by mouth Every Morning. 30 capsule 5    leflunomide (ARAVA) 20 MG tablet Take 1 tablet by mouth Every Night.      lisinopril (PRINIVIL,ZESTRIL) 5 MG tablet Take 1 tablet by mouth Every Morning. 90 tablet 3    metFORMIN (Glucophage) 500 MG tablet Take 1 tablet by mouth 2 (Two) Times a Day With Meals for 30 days. 60 tablet 0    methylPREDNISolone (MEDROL) 4 MG dose pack Take as directed on package instructions. 21 tablet 0    methylPREDNISolone (MEDROL) 4 MG dose pack Take as directed on package instructions. 21 tablet 0    methylPREDNISolone (MEDROL) 4 MG dose pack Take as directed on package instructions. 21 tablet 0    methylPREDNISolone (MEDROL) 4 MG dose pack Take as directed on package instructions. 21 tablet 0    naloxone (NARCAN) 4 MG/0.1ML nasal spray naloxone 4 mg/actuation nasal spray   CALL 911. SPR CONTENTS OF ONE SPRAYER (0.1ML) INTO ONE NOSTRIL. REPEAT IN 2-3 MIN IF SYMPTOMS OF OPIOID EMERGENCY PERSIST, ALTERNATE NOSTRILS      omeprazole (priLOSEC) 40 MG capsule Take 1 capsule by mouth Every Evening.      ondansetron (Zofran) 4 MG tablet Take 1 tablet by mouth Every 8 (Eight) Hours As Needed for Nausea or Vomiting. 30 tablet 0    ondansetron ODT (ZOFRAN-ODT) 4 MG disintegrating tablet ondansetron 4 mg disintegrating tablet   DISSOLVE 1 TABLET BY MOUTH EVERY 8 HOURS AS NEEDED WITH OXYCODONE      oxyCODONE-acetaminophen (PERCOCET) 5-325 MG per tablet oxycodone-acetaminophen 5 mg-325 mg tablet   TAKE 1 TABLET BY MOUTH THREE TIMES DAILY AS NEEDED      pravastatin (PRAVACHOL) 10 MG tablet Take 1 tablet by mouth Daily. (Patient taking differently: Take 1 tablet by mouth Every Night.) 90 tablet 1    promethazine (PHENERGAN) 12.5 MG tablet Take 1 tablet by mouth Every 6 (Six) Hours As Needed for Nausea or Vomiting. 30 tablet 1    rOPINIRole (REQUIP) 2 MG tablet Take 2 tablets by mouth Every Night.      SITagliptin (JANUVIA) 100 MG tablet Take 1  tablet by mouth Every Night.      TOPROL XL 50 MG 24 hr tablet Take 1.5 tablets by mouth Every Night. 135 tablet 3         PROCEDURES  Procedures            PROGRESS, DATA ANALYSIS, CONSULTS, AND MEDICAL DECISION MAKING  All labs have been independently interpreted by me.  All radiology studies have been reviewed by me. All EKG's have been independently viewed and interpreted by me.  Discussion below represents my analysis of pertinent findings related to patient's condition, differential diagnosis, treatment plan and final disposition.    Differential diagnosis includes but is not limited to UTI, pneumonia, heart failure, acute coronary syndrome, acute aortic syndrome, viral syndrome, edema, renal failure.    Clinical Scores:                                       ED Course as of 06/29/25 1519   Sun Jun 29, 2025   1319 EKG PROCEDURE    EKG time: 1312  Rhythm/Rate: Normal sinus rate 59  P waves and OH: Normal P, normal OH  QRS, axis: Narrow QRS, normal axis  ST and T waves: No acute accounting for artifact    Independently Interpreted by me  Not significantly changed compared to prior 3/3/2020   [TR]   1344 XR Chest 1 View  My independent interpretation of the imaging study is normal mediastinum [TR]   1344 Hemoglobin: 13.5 [TR]   1344 Nitrite, UA: Negative [TR]   1402 Creatinine: 0.79 [TR]   1402 proBNP: 765.0 [TR]   1402 HS Troponin T: 13 [TR]   1443 I reviewed the workup and findings with the patient and family at the bedside.  Answered all questions.  Her laboratory evaluation is unremarkable.  We are pending respiratory viral panel.  Her chest x-ray is clear.  Her EKG and cardiac markers are unremarkable.  I suspect she will be able to be discharged home after the respiratory viral panel results.  Regarding her blister I advised her to keep it intact as best possible in order to prevent any sort of infection.  I see no reason that she cannot have her eye surgery tomorrow as scheduled.  She does report she had  about 4 days of diarrhea last week which has resolved. [TR]   1518 COVID19: Not Detected [TR]      ED Course User Index  [TR] Joshua Hale MD             AS OF 15:19 EDT VITALS:    BP - 177/97  HR - 56  TEMP - 98 °F (36.7 °C)  O2 SATS - 99%    COMPLEXITY OF CARE  Admission was considered but after careful review of the patient's presentation, physical examination, diagnostic results, and response to treatment the patient may be safely discharged with outpatient follow-up.      DIAGNOSIS  Final diagnoses:   Blister (nonthermal) of right little finger, initial encounter   Other fatigue   Shortness of breath         DISPOSITION  ED Disposition       ED Disposition   Discharge    Condition   Stable    Comment   --                Please note that portions of this document were completed with a voice recognition program.    Note Disclaimer: At [facility], we believe that sharing information builds trust and better relationships. You are receiving this note because you recently visited [facility]. It is possible you will see health information before a provider has talked with you about it. This kind of information can be easy to misunderstand. To help you fully understand what it means for your health, we urge you to discuss this note with your provider.         Joshua Hale MD  06/29/25 1513

## 2025-06-29 NOTE — DISCHARGE INSTRUCTIONS
Try to keep the blister intact and do not pop it.  If it does pop use Neosporin at the site to help prevent infection.  Follow-up with your primary care doctor for further evaluation.

## (undated) DEVICE — GOWN,NON-REINFORCED,SIRUS,SET IN SLV,XXL: Brand: MEDLINE

## (undated) DEVICE — PK SHLDR OPN 40

## (undated) DEVICE — Device

## (undated) DEVICE — PENCL E/S ULTRAVAC TELESCP NOSE HOLSTR 10FT

## (undated) DEVICE — SKIN PREP TRAY W/CHG: Brand: MEDLINE INDUSTRIES, INC.

## (undated) DEVICE — MAT FLR ABSORBENT LG 4FT 10 2.5FT

## (undated) DEVICE — BIT DRL QC DIA 2.5X110MM

## (undated) DEVICE — APPL CHLORAPREP HI/LITE 26ML ORNG

## (undated) DEVICE — DRSNG GZ PETROLTM XEROFORM CURAD 1X8IN STRL

## (undated) DEVICE — SHEET, DRAPE, SPLIT, STERILE: Brand: MEDLINE

## (undated) DEVICE — SUT MNCRYL 3/0 PS2 18IN MCP497G

## (undated) DEVICE — CONN TBG Y 5 IN 1 LF STRL

## (undated) DEVICE — ARM SLING: Brand: DEROYAL

## (undated) DEVICE — SUT SILK 2/0 FS BLK 18IN 685G

## (undated) DEVICE — BNDG ELAS CO-FLEX SLF ADHR 4IN5YD LF STRL

## (undated) DEVICE — SOL ISO/ALC RUB 70PCT 4OZ

## (undated) DEVICE — DRP C/ARM 41X74IN

## (undated) DEVICE — NDL SPINE 18G 31/2IN PNK

## (undated) DEVICE — OPTIFOAM GENTLE SA, POSTOP, 4X12: Brand: MEDLINE

## (undated) DEVICE — GLV SURG BIOGEL LTX PF 8

## (undated) DEVICE — SPNG LAP 18X18IN LF STRL PK/5

## (undated) DEVICE — DUAL CUT SAGITTAL BLADE

## (undated) DEVICE — OPTIFOAM GENTLE SA, POSTOP, 4X8: Brand: MEDLINE

## (undated) DEVICE — DRSNG WND GZ PAD BORDERED 4X8IN STRL

## (undated) DEVICE — PK NEURO SPINE 40

## (undated) DEVICE — GLV SURG SENSICARE W/ALOE PF LF 7.5 STRL

## (undated) DEVICE — Device: Brand: NCB®

## (undated) DEVICE — DRAPE,SHOULDER,BEACH ULTRAGARD: Brand: MEDLINE

## (undated) DEVICE — APPL CHLORAPREP W/TINT 10.5ML PERC STRL

## (undated) DEVICE — ELECTRD BLD EDGE/INSUL1P 2.4X5.1MM STRL

## (undated) DEVICE — ANTIBACTERIAL UNDYED BRAIDED (POLYGLACTIN 910), SYNTHETIC ABSORBABLE SUTURE: Brand: COATED VICRYL

## (undated) DEVICE — LINER HUM/SHLDR TRABECULARMETAL REV POLY 65DEG 36MM PLS3
Type: IMPLANTABLE DEVICE | Site: SHOULDER | Status: NON-FUNCTIONAL
Removed: 2020-03-10

## (undated) DEVICE — GLV SURG SIGNATURE ESSENTIAL PF LTX SZ8

## (undated) DEVICE — DRSNG GZ CURAD XEROFORM NONADHR OVERWRAP 5X9IN

## (undated) DEVICE — 1 ML TUBERCULIN SYRINGE REGULAR TIP: Brand: MONOJECT

## (undated) DEVICE — 3M™ STERI-STRIP™ REINFORCED ADHESIVE SKIN CLOSURES, R1547, 1/2 IN X 4 IN (12 MM X 100 MM), 6 STRIPS/ENVELOPE: Brand: 3M™ STERI-STRIP™

## (undated) DEVICE — DRP MICROSCP LEICA W/GLASS LENS

## (undated) DEVICE — 6.0MM PRECISION ROUND

## (undated) DEVICE — SUT VIC 3/0 CTI 36IN J944H

## (undated) DEVICE — DRAPE,U/ SHT,SPLIT,PLAS,STERIL: Brand: MEDLINE

## (undated) DEVICE — DISPOSABLE IRRIGATION BIPOLAR CORD, M1000 TYPE: Brand: KIRWAN

## (undated) DEVICE — STPLR SKIN VISISTAT WD 35CT

## (undated) DEVICE — CODMAN® SURGICAL PATTIES 3/4" X 3/4" (1.91CM X 1.91CM): Brand: CODMAN®

## (undated) DEVICE — SUT SILK 2/0 TIES 18IN A185H

## (undated) DEVICE — NDL HYPO PRECISIONGLIDE/REG 18G 11/2 PNK

## (undated) DEVICE — IRRIGATOR BULB ASEPTO 60CC STRL

## (undated) DEVICE — GLV SURG SIGNATURE ESSENTIAL PF LTX SZ7.5

## (undated) DEVICE — DRN WND JP RND W TROC SIL 10F 1/8IN

## (undated) DEVICE — GLV SURG SENSICARE PI MIC PF SZ6.5 LF STRL

## (undated) DEVICE — 1000 S-DRAPE TOWEL DRAPE 10/BX: Brand: STERI-DRAPE™

## (undated) DEVICE — KIRSCHNER WIRE, WITH TROCAR TIP, Ø 2.0 M
Type: IMPLANTABLE DEVICE | Site: SHOULDER | Status: NON-FUNCTIONAL
Brand: KIRSCHNER WIRE
Removed: 2020-03-10

## (undated) DEVICE — ADHS SKIN DERMABOND TOP ADVANCED

## (undated) DEVICE — NEEDLE, QUINCKE 22GX3.5": Brand: MEDLINE INDUSTRIES, INC.

## (undated) DEVICE — GLV SURG BIOGEL LTX PF 7

## (undated) DEVICE — TRAP FLD MINIVAC MEGADYNE 100ML

## (undated) DEVICE — WHITE/BLUE CO-BRAID POLYETHYLENE SIZE 2 38" HC-5 NEEDLE BIOMET
Type: IMPLANTABLE DEVICE | Site: SHOULDER | Status: NON-FUNCTIONAL
Brand: MAXBRAID PE
Removed: 2020-03-10

## (undated) DEVICE — COVER,TABLE,HEAVY DUTY,79"X110",STRL: Brand: MEDLINE

## (undated) DEVICE — SMOKE EVACUATION TUBING WITH 7/8 IN TO 1/4 IN REDUCER: Brand: BUFFALO FILTER

## (undated) DEVICE — SUT VIC 0 CT1 36IN J946H

## (undated) DEVICE — 3.0MM NEURO (MATCH HEAD) LESS AGGRESSIVE

## (undated) DEVICE — JACKSON-PRATT 100CC BULB RESERVOIR: Brand: CARDINAL HEALTH

## (undated) DEVICE — 3M™ IOBAN™ 2 ANTIMICROBIAL INCISE DRAPE 6650EZ: Brand: IOBAN™ 2

## (undated) DEVICE — GLV SURG SENSICARE PI PF LF 7 GRN STRL

## (undated) DEVICE — SYR CONTRL LUERLOK 10CC

## (undated) DEVICE — SYR LUERLOK 30CC

## (undated) DEVICE — CONTAINER,SPECIMEN,OR STERILE,4OZ: Brand: MEDLINE

## (undated) DEVICE — GLV SURG PREMIERPRO ORTHO LTX PF SZ8 BRN